# Patient Record
Sex: FEMALE | Race: WHITE | NOT HISPANIC OR LATINO | Employment: PART TIME | URBAN - METROPOLITAN AREA
[De-identification: names, ages, dates, MRNs, and addresses within clinical notes are randomized per-mention and may not be internally consistent; named-entity substitution may affect disease eponyms.]

---

## 2019-10-14 ENCOUNTER — OFFICE VISIT (OUTPATIENT)
Dept: FAMILY MEDICINE CLINIC | Facility: CLINIC | Age: 24
End: 2019-10-14
Payer: COMMERCIAL

## 2019-10-14 VITALS
HEIGHT: 60 IN | TEMPERATURE: 98.6 F | OXYGEN SATURATION: 99 % | DIASTOLIC BLOOD PRESSURE: 80 MMHG | SYSTOLIC BLOOD PRESSURE: 122 MMHG | WEIGHT: 282 LBS | HEART RATE: 99 BPM | BODY MASS INDEX: 55.36 KG/M2

## 2019-10-14 DIAGNOSIS — L30.9 CHRONIC ECZEMA OF FOOT: Primary | ICD-10-CM

## 2019-10-14 PROCEDURE — 3008F BODY MASS INDEX DOCD: CPT | Performed by: FAMILY MEDICINE

## 2019-10-14 PROCEDURE — 99213 OFFICE O/P EST LOW 20 MIN: CPT | Performed by: FAMILY MEDICINE

## 2019-10-14 NOTE — PROGRESS NOTES
Assessment/Plan:    Diagnoses and all orders for this visit:    Chronic eczema of foot  Has failed on multiple therapies including anti-fungals and topical steroids; Differentials still include Tinea Pedia vs  Foot Eczema vs  Psoriasis; Will refer to Dermatology    -     Ambulatory referral to Dermatology; Future    BMI 50 0-59 9, adult ([facility] [address] )  Counseled on Diet/Lifestyle Modification        Subjective:      Patient ID: Erika Oseguera is a 21 y o  female  HPI  21year old female presents to clinic with a chief complaint of rash  Located on right foot  Ongoing for 4-5 months  Has tried OTC medications - Aquafor and other creams - provided no relief  Saw a doctor about 2 months ago and was given a steroid cream (Triamcinolone), which has not alleviated symptoms  She thought she had athlete's foot at one point and tried OTC anti-fungal medications without relief  She thought it was psoriasis and even tried creams for psoriasis  Rash located on outer portion of her right foot, right big toe, right middle toe, and over the webspace of the right 4th toe and 5th toe  Rash itches and sometimes weeps clear liquid  No other areas of body affected  The following portions of the patient's history were reviewed and updated as appropriate: allergies, current medications, past family history, past medical history, past social history, past surgical history and problem list     Review of Systems   Constitutional: Negative for activity change, appetite change, chills, diaphoresis, fatigue, fever and unexpected weight change  Skin: Positive for rash  All other systems reviewed and are negative  Objective:    /80   Pulse 99   Temp 98 6 °F (37 °C)   Ht 5' (1 524 m)   Wt 128 kg (282 lb)   SpO2 99%   BMI 55 07 kg/m²      Physical Exam   Constitutional: She appears well-developed and well-nourished  No distress  Skin: Rash noted   No abrasion, no bruising, no ecchymosis, no laceration, no lesion and no petechiae noted  She is not diaphoretic  No erythema  Nursing note and vitals reviewed

## 2020-02-08 ENCOUNTER — HOSPITAL ENCOUNTER (EMERGENCY)
Facility: HOSPITAL | Age: 25
Discharge: HOME | End: 2020-02-08
Attending: EMERGENCY MEDICINE
Payer: COMMERCIAL

## 2020-02-08 VITALS
OXYGEN SATURATION: 98 % | DIASTOLIC BLOOD PRESSURE: 81 MMHG | WEIGHT: 282 LBS | HEIGHT: 60 IN | BODY MASS INDEX: 55.36 KG/M2 | TEMPERATURE: 100.5 F | HEART RATE: 114 BPM | RESPIRATION RATE: 20 BRPM | SYSTOLIC BLOOD PRESSURE: 137 MMHG

## 2020-02-08 DIAGNOSIS — J11.1 INFLUENZA: Primary | ICD-10-CM

## 2020-02-08 PROCEDURE — 99283 EMERGENCY DEPT VISIT LOW MDM: CPT | Performed by: PHYSICIAN ASSISTANT

## 2020-02-08 PROCEDURE — 99282 EMERGENCY DEPT VISIT SF MDM: CPT

## 2020-02-08 RX ORDER — DEXAMETHASONE 4 MG/1
10 TABLET ORAL ONCE
Status: COMPLETED | OUTPATIENT
Start: 2020-02-08 | End: 2020-02-08

## 2020-02-08 RX ORDER — BENZONATATE 100 MG/1
100 CAPSULE ORAL 3 TIMES DAILY PRN
Qty: 20 CAPSULE | Refills: 0 | Status: SHIPPED | OUTPATIENT
Start: 2020-02-08 | End: 2020-12-08

## 2020-02-08 RX ORDER — ONDANSETRON 4 MG/1
4 TABLET, ORALLY DISINTEGRATING ORAL EVERY 6 HOURS PRN
Qty: 20 TABLET | Refills: 0 | Status: SHIPPED | OUTPATIENT
Start: 2020-02-08 | End: 2020-12-08

## 2020-02-08 RX ORDER — ONDANSETRON 4 MG/1
4 TABLET, ORALLY DISINTEGRATING ORAL ONCE
Status: COMPLETED | OUTPATIENT
Start: 2020-02-08 | End: 2020-02-08

## 2020-02-08 RX ORDER — FLUTICASONE PROPIONATE 50 MCG
2 SPRAY, SUSPENSION (ML) NASAL 2 TIMES DAILY
Qty: 16 G | Refills: 0 | Status: SHIPPED | OUTPATIENT
Start: 2020-02-08 | End: 2020-02-11

## 2020-02-08 RX ORDER — NAPROXEN 500 MG/1
500 TABLET ORAL 2 TIMES DAILY WITH MEALS
Qty: 14 TABLET | Refills: 0 | Status: SHIPPED | OUTPATIENT
Start: 2020-02-08 | End: 2020-12-10

## 2020-02-08 RX ADMIN — ONDANSETRON 4 MG: 4 TABLET, ORALLY DISINTEGRATING ORAL at 17:09

## 2020-02-08 RX ADMIN — DEXAMETHASONE 10 MG: 4 TABLET ORAL at 17:10

## 2020-02-08 NOTE — ED PROVIDER NOTES
History  Chief Complaint   Patient presents with    Sore Throat     Patient has had cough, congestion, fevers and a sore throat for 4 days  22 y/o female presenting with flu like symptoms x 3-4 days accompanied with cough, congestion, fevers, generalized body aches and sore throat  Has also had some nausea and vomiting  Sick contacts in with similar symptoms  Low-grade fevers being 100 4  Has not taken any medication for this today  She otherwise has been taking over-the-counter cough medications with some relief  She is tolerating fluids  Denies shortness of breath, chest pain, abdominal pain, weakness, facial swelling, difficulty swallowing  None       History reviewed  No pertinent past medical history  History reviewed  No pertinent surgical history  History reviewed  No pertinent family history  I have reviewed and agree with the history as documented  Social History     Tobacco Use    Smoking status: Former Smoker    Smokeless tobacco: Never Used   Substance Use Topics    Alcohol use: Not Currently    Drug use: Never        Review of Systems   Constitutional: Positive for fever  Negative for activity change, appetite change, chills, diaphoresis and fatigue  HENT: Positive for congestion and sore throat  Negative for dental problem, ear pain, facial swelling, mouth sores, postnasal drip, sinus pressure and trouble swallowing  Eyes: Negative  Respiratory: Positive for cough  Negative for chest tightness, shortness of breath and wheezing  Cardiovascular: Negative  Negative for chest pain  Gastrointestinal: Positive for nausea and vomiting  Negative for abdominal distention, abdominal pain, anal bleeding, blood in stool, constipation, diarrhea and rectal pain  Genitourinary: Negative  Musculoskeletal: Negative  Negative for arthralgias, neck pain and neck stiffness  Skin: Negative  Negative for rash  Neurological: Negative    Negative for facial asymmetry and headaches  All other systems reviewed and are negative  Physical Exam  Physical Exam   Constitutional: She is oriented to person, place, and time  She appears well-developed and well-nourished  No distress  HENT:   Head: Normocephalic and atraumatic  Right Ear: External ear normal    Left Ear: External ear normal    Nose: Nose normal    Mouth/Throat: Oropharynx is clear and moist  No oropharyngeal exudate  Mild amount of erythema noted  No swelling, exudate, or uvula deviation noted of mouth  No discoloration, asymmetries or swelling of the face  No ulcerations, fluctuance, induration or drainage noted  No petechiae noted on palate  Able to swallow without difficulty  Full ROM of jaw  Eyes: Pupils are equal, round, and reactive to light  Conjunctivae are normal  Right eye exhibits no discharge  Left eye exhibits no discharge  No scleral icterus  Neck: Normal range of motion  Neck supple  No tracheal deviation present  Cardiovascular: Normal rate, regular rhythm, normal heart sounds and intact distal pulses  Exam reveals no friction rub  No murmur heard  Pulmonary/Chest: Effort normal and breath sounds normal  No stridor  No respiratory distress  She has no wheezes  She has no rales  She exhibits no tenderness  SpO2 is 98%, within normal limits, resting comfortably  No cyanosis or pallor  Abdominal: Soft  Bowel sounds are normal  She exhibits no distension  There is no tenderness  There is no rebound and no guarding  Lymphadenopathy:     She has no cervical adenopathy  Neurological: She is alert and oriented to person, place, and time  Skin: Skin is warm and dry  Capillary refill takes less than 2 seconds  No rash noted  She is not diaphoretic  No erythema  No pallor  No sandpaper rash noted  No other rashes noted  Good coloration of skin  Nursing note and vitals reviewed        Vital Signs  ED Triage Vitals [02/08/20 1649]   Temperature Pulse Respirations Blood Pressure SpO2   100 5 °F (38 1 °C) (!) 114 20 137/81 98 %      Temp src Heart Rate Source Patient Position - Orthostatic VS BP Location FiO2 (%)   -- -- -- -- --      Pain Score       3           Vitals:    02/08/20 1649   BP: 137/81   Pulse: (!) 114         Visual Acuity      ED Medications  Medications   dexamethasone (DECADRON) tablet 10 mg (has no administration in time range)   ondansetron (ZOFRAN-ODT) dispersible tablet 4 mg (4 mg Oral Given 2/8/20 1709)       Diagnostic Studies  Results Reviewed     None                 No orders to display              Procedures  Procedures         ED Course                               MDM  Number of Diagnoses or Management Options  Diagnosis management comments: Discussed with patient that she likely has the flu  I offered IV hydration and lab work however patient states that she feels as though she can maintain her hydration at home with Zofran  Will treat symptomatically  Patient is informed to return to the emergency department for worsening of symptoms and was given proper education regarding their diagnosis and symptoms  Otherwise the patient is informed to follow up with their primary care doctor for re-evaluation  The patient verbalizes understanding and agrees with above assessment and plan  All questions were answered  Please Note: Fluency Direct voice recognition software may have been used in the creation of this document  Wrong words or sound a like substitutions may have occurred due to the inherent limitations of the voice software             Amount and/or Complexity of Data Reviewed  Review and summarize past medical records: yes  Independent visualization of images, tracings, or specimens: yes          Disposition  Final diagnoses:   Influenza     Time reflects when diagnosis was documented in both MDM as applicable and the Disposition within this note     Time User Action Codes Description Comment    2/8/2020  5:09 PM Racheal Barrett Add [J11 1] Influenza       ED Disposition     ED Disposition Condition Date/Time Comment    Discharge Stable Sat Feb 8, 2020  5:09 PM Marialuisa Sorto discharge to home/self care  Follow-up Information     Follow up With Specialties Details Why Contact Info Additional [address] Emergency Department Emergency Medicine Go to  If symptoms worsen such as high fevers, difficulty breathing, dehydration etc [facility]  [phone] [facility] ED, [facility] [place], [address]          Patient's Medications   Discharge Prescriptions    BENZONATATE (TESSALON PERLES) 100 MG CAPSULE    Take 1 capsule (100 mg total) by mouth 3 (three) times a day as needed for cough       Start Date: 2/8/2020  End Date: --       Order Dose: 100 mg       Quantity: 20 capsule    Refills: 0    FLUTICASONE (FLONASE) 50 MCG/ACT NASAL SPRAY    2 sprays into each nostril 2 (two) times a day for 3 days       Start Date: 2/8/2020  End Date: 2/11/2020       Order Dose: 2 sprays       Quantity: 16 g    Refills: 0    NAPROXEN (NAPROSYN) 500 MG TABLET    Take 1 tablet (500 mg total) by mouth 2 (two) times a day with meals for 7 days       Start Date: 2/8/2020  End Date: 2/15/2020       Order Dose: 500 mg       Quantity: 14 tablet    Refills: 0    ONDANSETRON (ZOFRAN-ODT) 4 MG DISINTEGRATING TABLET    Take 1 tablet (4 mg total) by mouth every 6 (six) hours as needed for nausea or vomiting       Start Date: 2/8/2020  End Date: --       Order Dose: 4 mg       Quantity: 20 tablet    Refills: 0     No discharge procedures on file      ED Provider  Electronically Signed by           Luisa Prado PA-C  02/08/20 2768

## 2020-02-08 NOTE — ED NOTES
Pt given prescriptions and discharge instructions  Pt ambulated out of ER, gait steady       Maritza Dumont RN  02/08/20 4434

## 2020-02-12 ENCOUNTER — OFFICE VISIT (OUTPATIENT)
Dept: URGENT CARE | Facility: CLINIC | Age: 25
End: 2020-02-12
Payer: COMMERCIAL

## 2020-02-12 VITALS
DIASTOLIC BLOOD PRESSURE: 55 MMHG | HEART RATE: 79 BPM | TEMPERATURE: 97.3 F | SYSTOLIC BLOOD PRESSURE: 125 MMHG | WEIGHT: 276 LBS | BODY MASS INDEX: 52.11 KG/M2 | HEIGHT: 61 IN | RESPIRATION RATE: 18 BRPM | OXYGEN SATURATION: 97 %

## 2020-02-12 DIAGNOSIS — J06.9 ACUTE URI: Primary | ICD-10-CM

## 2020-02-12 PROCEDURE — 1036F TOBACCO NON-USER: CPT | Performed by: PREVENTIVE MEDICINE

## 2020-02-12 PROCEDURE — 99213 OFFICE O/P EST LOW 20 MIN: CPT | Performed by: PREVENTIVE MEDICINE

## 2020-02-12 PROCEDURE — 3008F BODY MASS INDEX DOCD: CPT | Performed by: PREVENTIVE MEDICINE

## 2020-02-12 RX ORDER — AMOXICILLIN 500 MG/1
500 CAPSULE ORAL EVERY 8 HOURS SCHEDULED
Qty: 21 CAPSULE | Refills: 0 | Status: SHIPPED | OUTPATIENT
Start: 2020-02-12 | End: 2020-02-19

## 2020-02-12 NOTE — PATIENT INSTRUCTIONS

## 2020-02-13 NOTE — PROGRESS NOTES
[facility] [address] [facility]        NAME: Jacy Pop is a 21 y o  female  : 1995    MRN: [id number]  DATE: 2020  TIME: 10:48 AM    Assessment and Plan   Acute URI [J06 9]  1  Acute URI  amoxicillin (AMOXIL) 500 mg capsule         Patient Instructions       Follow up with PCP in 3-5 days  Proceed to  ER if symptoms worsen  Chief Complaint     Chief Complaint   Patient presents with    Shortness of Breath     SOB, temp 101, since last Wednesday, was seen in ER  given, Tessalon pearls, Flonase, Naproxen Feels she has Pneumonia,         History of Present Illness       Shortness of Breath   This is a new problem  The current episode started 1 to 4 weeks ago  The problem occurs constantly  The problem has been unchanged  Associated symptoms include chest pain, a fever and a sore throat  Nothing aggravates the symptoms  She has tried prescription cough suppressants for the symptoms  The treatment provided no relief  Review of Systems   Review of Systems   Constitutional: Positive for fever  HENT: Positive for congestion, postnasal drip, sinus pressure and sore throat  Eyes: Negative  Respiratory: Positive for chest tightness and shortness of breath  Cardiovascular: Positive for chest pain  All other systems reviewed and are negative          Current Medications       Current Outpatient Medications:     benzonatate (TESSALON PERLES) 100 mg capsule, Take 1 capsule (100 mg total) by mouth 3 (three) times a day as needed for cough, Disp: 20 capsule, Rfl: 0    naproxen (NAPROSYN) 500 mg tablet, Take 1 tablet (500 mg total) by mouth 2 (two) times a day with meals for 7 days, Disp: 14 tablet, Rfl: 0    amoxicillin (AMOXIL) 500 mg capsule, Take 1 capsule (500 mg total) by mouth every 8 (eight) hours for 7 days, Disp: 21 capsule, Rfl: 0    fluticasone (FLONASE) 50 mcg/act nasal spray, 2 sprays into each nostril 2 (two) times a day for 3 days, Disp: 16 g, Rfl: 0    ondansetron (ZOFRAN-ODT) 4 mg disintegrating tablet, Take 1 tablet (4 mg total) by mouth every 6 (six) hours as needed for nausea or vomiting (Patient not taking: Reported on 2/12/2020), Disp: 20 tablet, Rfl: 0    Current Allergies     Allergies as of 02/12/2020    (No Known Allergies)            The following portions of the patient's history were reviewed and updated as appropriate: allergies, current medications, past family history, past medical history, past social history, past surgical history and problem list      History reviewed  No pertinent past medical history  History reviewed  No pertinent surgical history  History reviewed  No pertinent family history  Medications have been verified  Objective   /55   Pulse 79   Temp (!) 97 3 °F (36 3 °C)   Resp 18   Ht 5' 0 5" (1 537 m)   Wt 125 kg (276 lb)   LMP 01/27/2020   SpO2 97%   BMI 53 02 kg/m²        Physical Exam     Physical Exam   Constitutional: She appears well-developed and well-nourished  HENT:   Head: Normocephalic  Right Ear: External ear normal    Left Ear: External ear normal    Nose: Mucosal edema and rhinorrhea present  Mouth/Throat: Posterior oropharyngeal erythema present  No oropharyngeal exudate or posterior oropharyngeal edema  Neck: Normal range of motion  Cardiovascular: Normal rate, regular rhythm and normal heart sounds  Pulmonary/Chest: Effort normal  She has no wheezes  Lymphadenopathy:     She has no cervical adenopathy  Skin: Skin is warm  No rash noted  No pallor  Nursing note and vitals reviewed

## 2020-08-31 ENCOUNTER — OFFICE VISIT (OUTPATIENT)
Dept: URGENT CARE | Facility: CLINIC | Age: 25
End: 2020-08-31
Payer: COMMERCIAL

## 2020-08-31 VITALS
SYSTOLIC BLOOD PRESSURE: 138 MMHG | OXYGEN SATURATION: 97 % | RESPIRATION RATE: 18 BRPM | BODY MASS INDEX: 53.81 KG/M2 | DIASTOLIC BLOOD PRESSURE: 56 MMHG | HEART RATE: 90 BPM | TEMPERATURE: 98.3 F | WEIGHT: 285 LBS | HEIGHT: 61 IN

## 2020-08-31 DIAGNOSIS — M54.6 ACUTE LEFT-SIDED THORACIC BACK PAIN: Primary | ICD-10-CM

## 2020-08-31 PROCEDURE — 99242 OFF/OP CONSLTJ NEW/EST SF 20: CPT | Performed by: PHYSICIAN ASSISTANT

## 2020-08-31 RX ORDER — METHOCARBAMOL 500 MG/1
500 TABLET, FILM COATED ORAL 4 TIMES DAILY
Qty: 40 TABLET | Refills: 0 | Status: SHIPPED | OUTPATIENT
Start: 2020-08-31 | End: 2020-12-08

## 2020-08-31 RX ORDER — IBUPROFEN 800 MG/1
800 TABLET ORAL EVERY 8 HOURS PRN
Qty: 30 TABLET | Refills: 0 | Status: SHIPPED | OUTPATIENT
Start: 2020-08-31 | End: 2020-12-08

## 2020-08-31 NOTE — PROGRESS NOTES
[address] [facility]        NAME: Verna Rodriguez is a 31 y o  female  : 1995    MRN: [id number]  DATE: 2020  TIME: 2:15 PM    Assessment and Plan   Acute left-sided thoracic back pain [M54 6]  1  Acute left-sided thoracic back pain  methocarbamol (ROBAXIN) 500 mg tablet    ibuprofen (MOTRIN) 800 mg tablet    Ambulatory referral to Physical Therapy         Patient Instructions     Patient Instructions     No fall or trauma  No radicular symptoms  Will benefit from physical therapy  Moist heat  Muscle relaxers at night  Do not take the Robaxin and drive until you know how make she feel  Follow up with PCP in 3-5 days  Proceed to  ER if symptoms worsen  Chief Complaint     Chief Complaint   Patient presents with    Back Pain     across lumbar area began after sleeping has no history of injury feels she slept funny         History of Present Illness         69-year-old female complains of 2-3 days of left-sided mid and upper back pain  She has no fall or trauma  She woke up with the pain  No recent change in activity or exercise  No pain down the arm numbness tingling  No pain down the lower back or legs  No muscle weakness  No change in bowel or bladder habits  Review of Systems   Review of Systems   Constitutional: Negative for chills, fatigue and fever  HENT: Negative for congestion, ear pain, postnasal drip, rhinorrhea, sinus pressure, sinus pain, sore throat and trouble swallowing  Eyes: Negative for visual disturbance  Respiratory: Negative for chest tightness and shortness of breath  Cardiovascular: Negative for chest pain and palpitations  Gastrointestinal: Negative for diarrhea, nausea and vomiting  Musculoskeletal: Positive for back pain  Neurological: Negative for dizziness           Current Medications       Current Outpatient Medications:     benzonatate (TESSALON PERLES) 100 mg capsule, Take 1 capsule (100 mg total) by mouth 3 (three) times a day as needed for cough (Patient not taking: Reported on 8/31/2020), Disp: 20 capsule, Rfl: 0    fluticasone (FLONASE) 50 mcg/act nasal spray, 2 sprays into each nostril 2 (two) times a day for 3 days, Disp: 16 g, Rfl: 0    ibuprofen (MOTRIN) 800 mg tablet, Take 1 tablet (800 mg total) by mouth every 8 (eight) hours as needed for mild pain, Disp: 30 tablet, Rfl: 0    methocarbamol (ROBAXIN) 500 mg tablet, Take 1 tablet (500 mg total) by mouth 4 (four) times a day, Disp: 40 tablet, Rfl: 0    naproxen (NAPROSYN) 500 mg tablet, Take 1 tablet (500 mg total) by mouth 2 (two) times a day with meals for 7 days, Disp: 14 tablet, Rfl: 0    ondansetron (ZOFRAN-ODT) 4 mg disintegrating tablet, Take 1 tablet (4 mg total) by mouth every 6 (six) hours as needed for nausea or vomiting (Patient not taking: Reported on 2/12/2020), Disp: 20 tablet, Rfl: 0    Current Allergies     Allergies as of 08/31/2020    (No Known Allergies)            The following portions of the patient's history were reviewed and updated as appropriate: allergies, current medications, past family history, past medical history, past social history, past surgical history and problem list      No past medical history on file  No past surgical history on file  No family history on file  Medications have been verified  Objective   /56   Pulse 90   Temp 98 3 °F (36 8 °C)   Resp 18   Ht 5' 1" (1 549 m)   Wt 129 kg (285 lb)   LMP 07/19/2020   SpO2 97%   BMI 53 85 kg/m²        Physical Exam     Physical Exam  Constitutional:       General: She is not in acute distress  Appearance: She is well-developed  Musculoskeletal:      Comments:   T-spine nontender  Lumbar spine nontender  She is tender along the left paravertebral muscles and medial scapular border and inferior scapula of the left side  Full range of motion bilateral upper extremities but pain with left shoulder flexion past 90    She has pain with left shoulder internal rotation at 90° of abduction  Bilateral upper extremity strength 5/5  Lumbar spine full range of motion  She has some pain with right lateral bending and left lateral bending  No pain with rotation  Negative straight leg raise bilaterally  Bilateral lower extremity strength 5/5  Neurological:      Mental Status: She is alert and oriented to person, place, and time

## 2020-08-31 NOTE — PATIENT INSTRUCTIONS
No fall or trauma  No radicular symptoms  Will benefit from physical therapy  Moist heat  Muscle relaxers at night  Do not take the Robaxin and drive until you know how make she feel

## 2020-12-08 ENCOUNTER — APPOINTMENT (EMERGENCY)
Dept: RADIOLOGY | Facility: HOSPITAL | Age: 25
End: 2020-12-08
Payer: COMMERCIAL

## 2020-12-08 ENCOUNTER — HOSPITAL ENCOUNTER (EMERGENCY)
Facility: HOSPITAL | Age: 25
Discharge: HOME | End: 2020-12-08
Attending: EMERGENCY MEDICINE | Admitting: EMERGENCY MEDICINE
Payer: COMMERCIAL

## 2020-12-08 VITALS
DIASTOLIC BLOOD PRESSURE: 68 MMHG | TEMPERATURE: 98.3 F | SYSTOLIC BLOOD PRESSURE: 115 MMHG | BODY MASS INDEX: 53.28 KG/M2 | OXYGEN SATURATION: 98 % | WEIGHT: 282 LBS | RESPIRATION RATE: 20 BRPM | HEART RATE: 104 BPM

## 2020-12-08 DIAGNOSIS — S20.211A CONTUSION OF RIGHT CHEST WALL, INITIAL ENCOUNTER: ICD-10-CM
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
## 2020-12-08 PROCEDURE — 71045 X-RAY EXAM CHEST 1 VIEW: CPT

## 2020-12-08 PROCEDURE — 93005 ELECTROCARDIOGRAM TRACING: CPT

## 2020-12-08 PROCEDURE — 99282 EMERGENCY DEPT VISIT SF MDM: CPT | Performed by: EMERGENCY MEDICINE

## 2020-12-08 PROCEDURE — 99284 EMERGENCY DEPT VISIT MOD MDM: CPT

## 2020-12-10 ENCOUNTER — HOSPITAL ENCOUNTER (EMERGENCY)
Facility: HOSPITAL | Age: 25
Discharge: HOME | End: 2020-12-10
Attending: EMERGENCY MEDICINE | Admitting: EMERGENCY MEDICINE
Payer: COMMERCIAL

## 2020-12-10 ENCOUNTER — APPOINTMENT (EMERGENCY)
Dept: RADIOLOGY | Facility: HOSPITAL | Age: 25
End: 2020-12-10
Payer: COMMERCIAL

## 2020-12-10 VITALS
TEMPERATURE: 98 F | DIASTOLIC BLOOD PRESSURE: 77 MMHG | BODY MASS INDEX: 53.34 KG/M2 | OXYGEN SATURATION: 100 % | SYSTOLIC BLOOD PRESSURE: 164 MMHG | HEART RATE: 95 BPM | WEIGHT: 282.3 LBS | RESPIRATION RATE: 20 BRPM

## 2020-12-10 DIAGNOSIS — S16.1XXA STRAIN OF NECK MUSCLE, INITIAL ENCOUNTER: ICD-10-CM

## 2020-12-10 DIAGNOSIS — S06.0X9A CONCUSSION: Primary | ICD-10-CM

## 2020-12-10 PROCEDURE — 99284 EMERGENCY DEPT VISIT MOD MDM: CPT | Performed by: EMERGENCY MEDICINE

## 2020-12-10 PROCEDURE — 99284 EMERGENCY DEPT VISIT MOD MDM: CPT

## 2020-12-10 PROCEDURE — 70450 CT HEAD/BRAIN W/O DYE: CPT

## 2020-12-10 PROCEDURE — 72125 CT NECK SPINE W/O DYE: CPT

## 2020-12-10 PROCEDURE — G1004 CDSM NDSC: HCPCS

## 2020-12-10 RX ORDER — ONDANSETRON 4 MG/1
4 TABLET, ORALLY DISINTEGRATING ORAL EVERY 8 HOURS PRN
Qty: 20 TABLET | Refills: 0 | Status: SHIPPED | OUTPATIENT
Start: 2020-12-10 | End: 2021-09-17 | Stop reason: ALTCHOICE

## 2020-12-11 LAB
ATRIAL RATE: 97 BPM
P AXIS: 55 DEGREES
PR INTERVAL: 164 MS
QRS AXIS: 4 DEGREES
QRSD INTERVAL: 82 MS
QT INTERVAL: 338 MS
QTC INTERVAL: 429 MS
T WAVE AXIS: 45 DEGREES
VENTRICULAR RATE: 97 BPM

## 2020-12-11 PROCEDURE — 93010 ELECTROCARDIOGRAM REPORT: CPT | Performed by: INTERNAL MEDICINE

## 2021-01-13 ENCOUNTER — TELEMEDICINE (OUTPATIENT)
Dept: FAMILY MEDICINE CLINIC | Facility: CLINIC | Age: 26
End: 2021-01-13
Payer: COMMERCIAL

## 2021-01-13 DIAGNOSIS — Z20.822 EXPOSURE TO COVID-19 VIRUS: ICD-10-CM

## 2021-01-13 DIAGNOSIS — B34.9 VIRAL INFECTION, UNSPECIFIED: ICD-10-CM

## 2021-01-13 PROCEDURE — 99213 OFFICE O/P EST LOW 20 MIN: CPT | Performed by: FAMILY MEDICINE

## 2021-01-13 NOTE — PROGRESS NOTES
COVID-19 Virtual Visit     Assessment/Plan:    Problem List Items Addressed This Visit     None      Visit Diagnoses     Exposure to COVID-19 virus        Viral infection, unspecified             Disposition:     I referred patient to one of our centralized sites for a COVID-19 swab  I have spent 15 minutes directly with the patient  Greater than 50% of this time was spent in counseling/coordination of care regarding: risks and benefits of treatment options, importance of treatment compliance and impressions  Encounter provider Travis Schmid DO    Provider located at [address] [place] [id number]    Recent Visits  No visits were found meeting these conditions  Showing recent visits within past 7 days and meeting all other requirements     Today's Visits  Date Type Provider Dept   21 Telemedicine Travis Schmid DO  [facility]   Showing today's visits and meeting all other requirements     Future Appointments  No visits were found meeting these conditions  Showing future appointments within next 150 days and meeting all other requirements          Subjective:   Kaur Damon is a 31 y o  female who is concerned about COVID-19  No results found for: Jen Zhu  No past medical history on file  Past Surgical History:   Procedure Laterality Date     SECTION       Current Outpatient Medications   Medication Sig Dispense Refill    fluticasone (FLONASE) 50 mcg/act nasal spray 2 sprays into each nostril 2 (two) times a day for 3 days 16 g 0    ondansetron (ZOFRAN-ODT) 4 mg disintegrating tablet Take 1 tablet (4 mg total) by mouth every 8 (eight) hours as needed for nausea or vomiting 20 tablet 0     No current facility-administered medications for this visit  No Known Allergies    Review of Systems  Objective:     There were no vitals filed for this visit     Physical Exam  VIRTUAL VISIT DISCLAIMER    Rafael Rain acknowledges that she has consented to an online visit or consultation  She understands that the online visit is based solely on information provided by her, and that, in the absence of a face-to-face physical evaluation by the physician, the diagnosis she receives is both limited and provisional in terms of accuracy and completeness  This is not intended to replace a full medical face-to-face evaluation by the physician  Rafael Rain understands and accepts these terms

## 2021-01-15 ENCOUNTER — DOCUMENTATION (OUTPATIENT)
Dept: FAMILY MEDICINE CLINIC | Facility: CLINIC | Age: 26
End: 2021-01-15

## 2021-01-15 ENCOUNTER — TELEPHONE (OUTPATIENT)
Dept: FAMILY MEDICINE CLINIC | Facility: CLINIC | Age: 26
End: 2021-01-15

## 2021-01-15 DIAGNOSIS — Z20.822 EXPOSURE TO COVID-19 VIRUS: ICD-10-CM

## 2021-01-15 DIAGNOSIS — Z20.822 EXPOSURE TO COVID-19 VIRUS: Primary | ICD-10-CM

## 2021-01-15 DIAGNOSIS — J06.9 VIRAL URI: ICD-10-CM

## 2021-01-15 PROCEDURE — U0003 INFECTIOUS AGENT DETECTION BY NUCLEIC ACID (DNA OR RNA); SEVERE ACUTE RESPIRATORY SYNDROME CORONAVIRUS 2 (SARS-COV-2) (CORONAVIRUS DISEASE [COVID-19]), AMPLIFIED PROBE TECHNIQUE, MAKING USE OF HIGH THROUGHPUT TECHNOLOGIES AS DESCRIBED BY CMS-2020-01-R: HCPCS | Performed by: STUDENT IN AN ORGANIZED HEALTH CARE EDUCATION/TRAINING PROGRAM

## 2021-01-15 PROCEDURE — U0005 INFEC AGEN DETEC AMPLI PROBE: HCPCS | Performed by: STUDENT IN AN ORGANIZED HEALTH CARE EDUCATION/TRAINING PROGRAM

## 2021-01-15 NOTE — TELEPHONE ENCOUNTER
Patient was had Virtual Visit on 1/13/2021, States a Order was going to Placed into her chart for COVID Test, states When arriving at the [facility], she was informed no order in her chart

## 2021-01-17 LAB — SARS-COV-2 RNA SPEC QL NAA+PROBE: DETECTED

## 2021-05-28 ENCOUNTER — OFFICE VISIT (OUTPATIENT)
Dept: URGENT CARE | Facility: CLINIC | Age: 26
End: 2021-05-28
Payer: COMMERCIAL

## 2021-05-28 VITALS
DIASTOLIC BLOOD PRESSURE: 75 MMHG | HEIGHT: 60 IN | SYSTOLIC BLOOD PRESSURE: 124 MMHG | RESPIRATION RATE: 16 BRPM | BODY MASS INDEX: 54.38 KG/M2 | HEART RATE: 81 BPM | WEIGHT: 277 LBS | OXYGEN SATURATION: 97 % | TEMPERATURE: 96.8 F

## 2021-05-28 DIAGNOSIS — J01.90 ACUTE NON-RECURRENT SINUSITIS, UNSPECIFIED LOCATION: Primary | ICD-10-CM

## 2021-05-28 DIAGNOSIS — J30.1 SEASONAL ALLERGIC RHINITIS DUE TO POLLEN: ICD-10-CM

## 2021-05-28 PROCEDURE — 1036F TOBACCO NON-USER: CPT | Performed by: PHYSICIAN ASSISTANT

## 2021-05-28 PROCEDURE — 3008F BODY MASS INDEX DOCD: CPT | Performed by: PHYSICIAN ASSISTANT

## 2021-05-28 PROCEDURE — 99213 OFFICE O/P EST LOW 20 MIN: CPT | Performed by: PHYSICIAN ASSISTANT

## 2021-05-28 RX ORDER — FLUTICASONE PROPIONATE 50 MCG
2 SPRAY, SUSPENSION (ML) NASAL DAILY
Qty: 16 G | Refills: 0 | Status: SHIPPED | OUTPATIENT
Start: 2021-05-28 | End: 2021-09-17 | Stop reason: ALTCHOICE

## 2021-05-28 RX ORDER — BENZONATATE 200 MG/1
200 CAPSULE ORAL 3 TIMES DAILY PRN
Qty: 20 CAPSULE | Refills: 0 | Status: SHIPPED | OUTPATIENT
Start: 2021-05-28 | End: 2021-09-17 | Stop reason: ALTCHOICE

## 2021-05-28 RX ORDER — AZITHROMYCIN 250 MG/1
TABLET, FILM COATED ORAL
Qty: 6 TABLET | Refills: 0 | Status: SHIPPED | OUTPATIENT
Start: 2021-05-28 | End: 2021-06-01

## 2021-05-28 NOTE — PROGRESS NOTES
[facility]        NAME: Samuel Hernandez is a 31 y o  female  : 1995    MRN: [id number]  DATE:  May 28, 2021  TIME: 12:06 PM    Assessment and Plan   Acute non-recurrent sinusitis, unspecified location [J01 90]  1  Acute non-recurrent sinusitis, unspecified location  azithromycin (ZITHROMAX) 250 mg tablet    benzonatate (TESSALON) 200 MG capsule   2  Seasonal allergic rhinitis due to pollen  fluticasone (FLONASE) 50 mcg/act nasal spray         Patient Instructions     Discussed condition with pt  She has acute sinusitis which likely stems from uncontrolled allergic rhinitis  I will treat with an oral abx, Tessalon Perles, and Flonase and rec hydration, rest, discussed OTC cough/cold/allergy meds, and observation  Should be re-evaluated if condition persists or worsens  Follow up with PCP in 3-5 days  Proceed to  ER if symptoms worsen  Chief Complaint     Chief Complaint   Patient presents with    URI     Pt reports of worsening URI s/s with post nasal drip and congestion  S/s started approx 5 days ago,  History of Present Illness         Patient presents with 5 day history of nasal congestion, postnasal drip, cough which is worsening  Denies sore throat, fever, chills, N/ V/D, or known direct exposure to COVID-19  Has been managing symptoms with OTC meds  She believes she may have allergies  Denies asthma  Does not smoke  Review of Systems   Review of Systems   Constitutional: Negative  HENT: Positive for congestion and postnasal drip  Negative for sore throat  Respiratory: Positive for cough  Negative for shortness of breath  Cardiovascular: Negative  Gastrointestinal: Negative  Genitourinary: Negative            Current Medications       Current Outpatient Medications:     benzonatate (TESSALON) 200 MG capsule, Take 1 capsule (200 mg total) by mouth 3 (three) times a day as needed for cough, Disp: 20 capsule, Rfl: 0    fluticasone (FLONASE) 50 mcg/act nasal spray, 2 sprays into each nostril daily, Disp: 16 g, Rfl: 0    ondansetron (ZOFRAN-ODT) 4 mg disintegrating tablet, Take 1 tablet (4 mg total) by mouth every 8 (eight) hours as needed for nausea or vomiting, Disp: 20 tablet, Rfl: 0    Current Allergies     Allergies as of 2021    (No Known Allergies)            The following portions of the patient's history were reviewed and updated as appropriate: allergies, current medications, past family history, past medical history, past social history, past surgical history and problem list      History reviewed  No pertinent past medical history  Past Surgical History:   Procedure Laterality Date     SECTION         History reviewed  No pertinent family history  Medications have been verified  Objective   /75   Pulse 81   Temp (!) 96 8 °F (36 °C)   Resp 16   Ht 5' (1 524 m)   Wt 126 kg (277 lb)   SpO2 97%   BMI 54 10 kg/m²   No LMP recorded  Physical Exam     Physical Exam  Vitals signs reviewed  Constitutional:       General: She is not in acute distress  Appearance: She is well-developed  HENT:      Right Ear: Hearing, tympanic membrane, ear canal and external ear normal       Left Ear: Hearing, tympanic membrane, ear canal and external ear normal       Nose: Mucosal edema (  Bilateral boggy turbinates) and congestion present  Mouth/Throat:      Mouth: Mucous membranes are moist       Pharynx: Posterior oropharyngeal erythema ( PND) present  No oropharyngeal exudate  Tonsils: No tonsillar exudate  Neck:      Musculoskeletal: Neck supple  Cardiovascular:      Rate and Rhythm: Normal rate and regular rhythm  Pulses: Normal pulses  Heart sounds: Normal heart sounds  No murmur  Pulmonary:      Effort: Pulmonary effort is normal  No respiratory distress  Breath sounds: Normal breath sounds  Lymphadenopathy:      Cervical: No cervical adenopathy     Neurological:      Mental Status: She is alert and oriented to person, place, and time

## 2021-05-28 NOTE — PATIENT INSTRUCTIONS
Sinusitis, Ambulatory Care   GENERAL INFORMATION:   Sinusitis  is inflammation or infection of your sinuses  It is most often caused by a virus  Acute sinusitis may last up to 12 weeks  Chronic sinusitis lasts longer than 12 weeks  Recurrent sinusitis is when you have 3 or more episodes of sinusitis in 1 year  Common symptoms include the following:   · Fever    · Pain, pressure, redness, or swelling around the forehead, cheeks, or eyes    · Thick yellow or green discharge from your nose    · Tenderness when you touch your face over your sinuses    · Dry cough that happens mostly at night or when you lie down    · Headache and face pain that is worse when you lean forward    · Teeth pain or pain when you chew  Seek immediate care for the following symptoms:   · Vision changes such as double vision    · Confusion or trouble thinking clearly    · Headache and stiff neck    · Trouble breathing  Treatment for sinusitis  may include medicines to relieve nasal and sinus congestion or to decrease pain and fever  Ask your healthcare provider which medicines you should take and how much is safe  Manage sinusitis:   · Drink liquids as directed  Ask your healthcare provider how much liquid to drink each day and which liquids are best for you  Liquids will help loosen and drain the mucus in your sinuses  · Breathe in steam   Heat a bowl of water until you see steam  Lean over the bowl and make a tent over your head with a large towel  Breathe deeply for about 20 minutes  Be careful not to get too close to the steam or burn yourself  Do this 3 times a day  You can also breathe deeply when you take a hot shower  · Rinse your sinuses  Use a sinus rinse device to rinse your nasal passages with a saline (salt water) solution  This will help thin the mucus in your nose and rinse away pollen and dirt  It will also help reduce swelling so you can breathe normally  Ask how often to do this       · Use heat on your sinuses  to decrease pain  Apply heat for 15 to 20 minutes every hour for as many days as directed  · Sleep with your head elevated  Place an extra pillow under your head before you go to sleep to help your sinuses drain  · Do not smoke and avoid secondhand smoke  If you smoke, it is never too late to quit  Ask for information about how to stop smoking if you need help  Prevent the spread of germs that cause sinusitis:  Wash your hands often with soap and water  Wash your hands after you use the bathroom, change a child's diaper, or sneeze  Wash your hands before you prepare or eat food  Follow up with your healthcare provider as directed:  Write down your questions so you remember to ask them during your visits  CARE AGREEMENT:   You have the right to help plan your care  Learn about your health condition and how it may be treated  Discuss treatment options with your caregivers to decide what care you want to receive  You always have the right to refuse treatment  The above information is an  only  It is not intended as medical advice for individual conditions or treatments  Talk to your doctor, nurse or pharmacist before following any medical regimen to see if it is safe and effective for you  © 2014 [address] is for End User's use only and may not be sold, redistributed or otherwise used for commercial purposes  All illustrations and images included in CareNotes® are the copyrighted property of [employer]  or Bernabe Patten  Allergic Rhinitis   WHAT YOU NEED TO KNOW:   Allergic rhinitis, or hay fever, is swelling of the inside of your nose  The swelling is a reaction to allergens in the air  An allergen can be anything that causes an allergic reaction  Allergies to weeds, grass, trees, or mold often cause seasonal allergic rhinitis  Indoor dust mites, cockroaches, pet dander, or mold can also cause allergic rhinitis     DISCHARGE INSTRUCTIONS:   Call 911 for the following:   · You have chest pain or shortness of breath  Return to the emergency department if:   · You have severe pain  · You cough up blood  Contact your healthcare provider if:   · You have a fever  · You have ear or sinus pain, or a headache  · Your symptoms get worse, even after treatment  · You have yellow, green, brown, or bloody mucus coming from your nose  · Your nose is bleeding or you have pain inside your nose  · You have trouble sleeping because of your symptoms  · You have questions or concerns about your condition or care  Medicines:   · Medicines  help decrease your symptoms and clear your stuffy nose  · Take your medicine as directed  Contact your healthcare provider if you think your medicine is not helping or if you have side effects  Tell him of her if you are allergic to any medicine  Keep a list of the medicines, vitamins, and herbs you take  Include the amounts, and when and why you take them  Bring the list or the pill bottles to follow-up visits  Carry your medicine list with you in case of an emergency  How to manage allergic rhinitis:  The best way to manage allergic rhinitis is to avoid allergens that can trigger your symptoms  Any of the following may help decrease your symptoms:  · Rinse your nose and sinuses  with a salt water solution or use a salt water nasal spray  This will help thin the mucus in your nose and rinse away pollen and dirt  It will also help reduce swelling so you can breathe normally  Ask your healthcare provider how often to rinse your nose  · Reduce exposure to dust mites  Wash sheets and towels in hot water every week  Cover your pillows and mattresses with allergen-free covers  Limit the number of stuffed animals and soft toys your child has  Wash your child's toys in hot water regularly  Vacuum weekly and use a vacuum  with an air filter  If possible, get rid of carpets and curtains   These collect dust and dust mites  · Reduce exposure to pollen  Keep windows and doors closed in your house and car  Stay inside when air pollution or the pollen count is high  Run your air conditioner on recycle, and change air filters often  Shower and wash your hair before bed every night to rinse away pollen  · Reduce exposure to pet dander  If possible, do not keep cats, dogs, birds, or other pets  If you do keep pets in your home, keep them out of bedrooms and carpeted rooms  Bathe them often  · Reduce exposure to mold  Do not spend time in basements  Choose artificial plants instead of live plants  Keep your home's humidity at less than 45%  Do not have ponds or standing water in your home or yard  · Do not smoke  Avoid others who smoke  Ask your healthcare provider for information if you currently smoke and need help to quit  Follow up with your healthcare provider as directed: You may need to see an allergist often to control your symptoms  Write down your questions so you remember to ask them during your visits  © Copyright [address] Information is for End User's use only and may not be sold, redistributed or otherwise used for commercial purposes  All illustrations and images included in CareNotes® are the copyrighted property of [employer]  or [address] [place], [place] [id number]   The above information is an  only  It is not intended as medical advice for individual conditions or treatments  Talk to your doctor, nurse or pharmacist before following any medical regimen to see if it is safe and effective for you

## 2021-06-03 ENCOUNTER — HOSPITAL ENCOUNTER (EMERGENCY)
Facility: HOSPITAL | Age: 26
Discharge: HOME | End: 2021-06-03
Attending: EMERGENCY MEDICINE | Admitting: EMERGENCY MEDICINE
Payer: COMMERCIAL

## 2021-06-03 VITALS
DIASTOLIC BLOOD PRESSURE: 78 MMHG | BODY MASS INDEX: 55.84 KG/M2 | WEIGHT: 285.94 LBS | TEMPERATURE: 98.7 F | SYSTOLIC BLOOD PRESSURE: 181 MMHG | HEART RATE: 94 BPM | OXYGEN SATURATION: 100 % | RESPIRATION RATE: 16 BRPM

## 2021-06-03 DIAGNOSIS — R52 BODY ACHES: ICD-10-CM
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
## 2021-06-03 PROCEDURE — 99284 EMERGENCY DEPT VISIT MOD MDM: CPT

## 2021-06-03 PROCEDURE — 99282 EMERGENCY DEPT VISIT SF MDM: CPT | Performed by: EMERGENCY MEDICINE

## 2021-06-03 RX ORDER — ACETAMINOPHEN 325 MG/1
650 TABLET ORAL ONCE
Status: COMPLETED | OUTPATIENT
Start: 2021-06-03 | End: 2021-06-03

## 2021-06-03 RX ADMIN — ACETAMINOPHEN 650 MG: 325 TABLET, FILM COATED ORAL at 19:53
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
## 2021-09-17 ENCOUNTER — OFFICE VISIT (OUTPATIENT)
Dept: FAMILY MEDICINE CLINIC | Facility: CLINIC | Age: 26
End: 2021-09-17
Payer: COMMERCIAL

## 2021-09-17 VITALS
TEMPERATURE: 97.3 F | OXYGEN SATURATION: 97 % | HEART RATE: 89 BPM | SYSTOLIC BLOOD PRESSURE: 120 MMHG | DIASTOLIC BLOOD PRESSURE: 80 MMHG | HEIGHT: 60 IN | WEIGHT: 269.9 LBS | RESPIRATION RATE: 16 BRPM | BODY MASS INDEX: 52.99 KG/M2

## 2021-09-17 DIAGNOSIS — H91.90 DECREASED HEARING, UNSPECIFIED LATERALITY: ICD-10-CM

## 2021-09-17 DIAGNOSIS — Z78.9 NEED FOR FOLLOW-UP BY SOCIAL WORKER: ICD-10-CM

## 2021-09-17 DIAGNOSIS — Z11.59 NEED FOR HEPATITIS C SCREENING TEST: ICD-10-CM

## 2021-09-17 DIAGNOSIS — L30.8 OTHER ECZEMA: ICD-10-CM

## 2021-09-17 DIAGNOSIS — Z11.4 SCREENING FOR HIV (HUMAN IMMUNODEFICIENCY VIRUS): ICD-10-CM

## 2021-09-17 DIAGNOSIS — Z00.00 ANNUAL PHYSICAL EXAM: Primary | ICD-10-CM

## 2021-09-17 DIAGNOSIS — Z13.220 ENCOUNTER FOR LIPID SCREENING FOR CARDIOVASCULAR DISEASE: ICD-10-CM

## 2021-09-17 DIAGNOSIS — R53.83 FATIGUE, UNSPECIFIED TYPE: ICD-10-CM

## 2021-09-17 DIAGNOSIS — Z23 ENCOUNTER FOR IMMUNIZATION: ICD-10-CM

## 2021-09-17 DIAGNOSIS — R04.0 FREQUENT EPISTAXIS: ICD-10-CM

## 2021-09-17 DIAGNOSIS — Z13.6 ENCOUNTER FOR LIPID SCREENING FOR CARDIOVASCULAR DISEASE: ICD-10-CM

## 2021-09-17 PROBLEM — L30.9 ECZEMA: Status: ACTIVE | Noted: 2021-09-17

## 2021-09-17 PROCEDURE — 90472 IMMUNIZATION ADMIN EACH ADD: CPT

## 2021-09-17 PROCEDURE — 1036F TOBACCO NON-USER: CPT | Performed by: FAMILY MEDICINE

## 2021-09-17 PROCEDURE — 3725F SCREEN DEPRESSION PERFORMED: CPT | Performed by: FAMILY MEDICINE

## 2021-09-17 PROCEDURE — 3008F BODY MASS INDEX DOCD: CPT | Performed by: FAMILY MEDICINE

## 2021-09-17 PROCEDURE — 99395 PREV VISIT EST AGE 18-39: CPT | Performed by: FAMILY MEDICINE

## 2021-09-17 PROCEDURE — 90471 IMMUNIZATION ADMIN: CPT

## 2021-09-17 PROCEDURE — 90686 IIV4 VACC NO PRSV 0.5 ML IM: CPT

## 2021-09-17 PROCEDURE — 90715 TDAP VACCINE 7 YRS/> IM: CPT

## 2021-09-17 RX ORDER — FENOPROFEN CALCIUM 200 MG
CAPSULE ORAL 2 TIMES DAILY
Qty: 118 ML | Refills: 0 | Status: SHIPPED | OUTPATIENT
Start: 2021-09-17 | End: 2022-04-25 | Stop reason: ALTCHOICE

## 2021-09-17 NOTE — PROGRESS NOTES
[address] [facility]    NAME: Tracy Odom  AGE: 31 y o  SEX: female  : 1995     DATE: 2021      Patient to come back for annual gyn exam     Assessment and Plan:     Problem List Items Addressed This Visit        Musculoskeletal and Integument    Eczema    Relevant Medications    hydrocortisone 1 % lotion      Other Visit Diagnoses     Annual physical exam    -  Primary    Encounter for lipid screening for cardiovascular disease        Relevant Orders    Lipid panel    BMI 50 0-59 9, adult ([facility] [address] )        Relevant Orders    Hemoglobin A1C    Comprehensive metabolic panel    Ambulatory referral to Weight Management    Fatigue, unspecified type        Relevant Orders    TSH, 3rd generation with Free T4 reflex    Vitamin D 25 hydroxy    Screening for HIV (human immunodeficiency virus)        Relevant Orders    HIV 1/2 Antigen/Antibody (4th Generation) w Reflex [facility]    Need for hepatitis C screening test        Relevant Orders    Hepatitis C antibody    Frequent epistaxis        Relevant Orders    Ambulatory Referral to Otolaryngology    CBC and Platelet    Need for follow-up by         Relevant Orders    Ambulatory referral to social work care management program    Decreased hearing, unspecified laterality        Encounter for immunization        Relevant Orders    influenza vaccine, quadrivalent, 0 5 mL, preservative-free, for adult and pediatric patients 6 mos+ (AFLURIA, FLUARIX, FLULAVAL, FLUZONE) (Completed)    TDAP VACCINE GREATER THAN OR EQUAL TO 8YO IM (Completed)      Discussed with patient the benefits, contraindications and side effects of the following vaccines: Tetanus, Diphtheria, Pertussis or Influenza   Discussed 4 components of the vaccine/s  Immunizations and preventive care screenings were discussed with patient today   Appropriate education was printed on patient's after visit summary  Counseling:  Exercise: the importance of regular exercise/physical activity was discussed  Recommend exercise 3-5 times per week for at least 30 minutes  · Discussed healthy eating and weight loss     BMI Counseling: Body mass index is 52 71 kg/m²  The BMI is above normal  Nutrition recommendations include encouraging healthy choices of fruits and vegetables, decreasing fast food intake and limiting drinks that contain sugar  Exercise recommendations include moderate physical activity 150 minutes/week, vigorous physical activity 75 minutes/week and exercising 3-5 times per week  Patient referred to weight management  Rationale for BMI follow-up plan is due to patient being overweight or obese  Depression Screening and Follow-up Plan:   Patient was screened for depression during today's encounter  They screened negative with a PHQ-2 score of 0  Return in about 4 weeks (around 10/15/2021), or needs annual GYN with Dr Maricarmen Henley  Chief Complaint:     Chief Complaint   Patient presents with    Annual Exam     Has been dealing with bad nose bleeds, got worse recently  Has been feeling tired no matter how long she sleeps  Excema has been flaring up since April  Not sure why hearing doesn't seem normal lately  History of Present Illness:     Adult Annual Physical   Patient here for a comprehensive physical exam  The patient reports fatigue, eczema, frequent nose bleeds   Diet and Physical Activity  · Diet/Nutrition: diabetic diet and lots of carbs, no sugar drinks   · Exercise: walking  Depression Screening  PHQ-9 Depression Screening    PHQ-9:   Frequency of the following problems over the past two weeks:      Little interest or pleasure in doing things: 0 - not at all  Feeling down, depressed, or hopeless: 0 - not at all  PHQ-2 Score: 0       General Health  · Sleep: difficulty getting back to sleep  · Hearing: decreased hearing  · Vision: goes for regular eye exams  · Dental: regular dental visits  /GYN Health  · Last menstrual period:   · Contraceptive method: not sexually active  · History of STDs?: no      Review of Systems:     Review of Systems   Constitutional: Positive for fatigue  Negative for chills and fever  HENT: Positive for hearing loss  Negative for congestion, dental problem, ear discharge, ear pain, rhinorrhea, sinus pressure, sinus pain, sneezing, sore throat, trouble swallowing and voice change  Respiratory: Negative for cough, chest tightness and shortness of breath  Cardiovascular: Negative for chest pain, palpitations and leg swelling  Gastrointestinal: Negative for abdominal pain, diarrhea, nausea and vomiting  Genitourinary: Negative for difficulty urinating, dysuria, frequency, vaginal bleeding, vaginal discharge and vaginal pain  Skin: Positive for rash (eczema)  Neurological: Negative for dizziness, seizures, syncope, weakness and headaches  Hematological:        Frequent nose bleeds     Psychiatric/Behavioral: Negative for dysphoric mood, sleep disturbance and suicidal ideas  The patient is not nervous/anxious         Past Medical History:     Past Medical History:   Diagnosis Date    Urinary tract infection       Past Surgical History:     Past Surgical History:   Procedure Laterality Date     SECTION   and       Social History:     Social History     Tobacco Use    Smoking status: Former Smoker    Smokeless tobacco: Never Used    Tobacco comment: quit 4 5 year ago   Vaping Use    Vaping Use: Never used   Substance Use Topics    Alcohol use: Yes     Comment: social     Drug use: Never        Family History:     Family History   Problem Relation Age of Onset    Lung cancer Mother     Thyroid disease Father       Current Medications:     Current Outpatient Medications   Medication Sig Dispense Refill    hydrocortisone 1 % lotion Apply topically 2 (two) times a day 118 mL 0     No current facility-administered medications for this visit  Allergies: Allergies   Allergen Reactions    Other Hives     SOME shellfish       Physical Exam:     /80 (BP Location: Left arm, Patient Position: Sitting, Cuff Size: Large)   Pulse 89   Temp (!) 97 3 °F (36 3 °C) (Tympanic)   Resp 16   Ht 5' (1 524 m)   Wt 122 kg (269 lb 14 4 oz)   SpO2 97%   BMI 52 71 kg/m²     Physical Exam  Constitutional:       General: She is not in acute distress  Appearance: She is obese  She is not ill-appearing  HENT:      Head: Normocephalic and atraumatic  Right Ear: Tympanic membrane, ear canal and external ear normal  There is no impacted cerumen  Left Ear: Tympanic membrane, ear canal and external ear normal  There is no impacted cerumen  Ears:      Comments: Decreased hearing in right ear     Nose: Nose normal       Mouth/Throat:      Mouth: Mucous membranes are moist       Pharynx: Oropharynx is clear  No oropharyngeal exudate  Eyes:      General: No scleral icterus  Right eye: No discharge  Left eye: No discharge  Pupils: Pupils are equal, round, and reactive to light  Cardiovascular:      Rate and Rhythm: Normal rate and regular rhythm  Pulses: Normal pulses  Heart sounds: No murmur heard  Pulmonary:      Effort: Pulmonary effort is normal  No respiratory distress  Breath sounds: Normal breath sounds  No wheezing  Abdominal:      General: Abdomen is flat  There is no distension  Tenderness: There is no abdominal tenderness  Musculoskeletal:         General: No swelling, tenderness, deformity or signs of injury  Right lower leg: No edema  Left lower leg: No edema  Skin:     General: Skin is warm and dry  Coloration: Skin is not jaundiced  Findings: Rash (Eczema noted on extensor surfaces of  forearms) present  No bruising or lesion  Neurological:      General: No focal deficit present        Mental Status: She is alert and oriented to person, place, and time  Cranial Nerves: No cranial nerve deficit  Sensory: No sensory deficit  Motor: No weakness  Coordination: Coordination normal       Gait: Gait normal       Deep Tendon Reflexes: Reflexes normal    Psychiatric:         Mood and Affect: Mood normal          Behavior: Behavior normal          Thought Content:  Thought content normal          Judgment: Judgment normal           Marta Ortega DO   [address]

## 2021-09-17 NOTE — PATIENT INSTRUCTIONS

## 2021-09-23 ENCOUNTER — PATIENT OUTREACH (OUTPATIENT)
Dept: FAMILY MEDICINE CLINIC | Facility: CLINIC | Age: 26
End: 2021-09-23

## 2021-09-23 LAB
ERYTHROCYTE [DISTWIDTH] IN BLOOD BY AUTOMATED COUNT: 13.2 % (ref 11.7–15.4)
HCT VFR BLD AUTO: 40.3 % (ref 34–46.6)
HGB BLD-MCNC: 13.5 G/DL (ref 11.1–15.9)
MCH RBC QN AUTO: 27.4 PG (ref 26.6–33)
MCHC RBC AUTO-ENTMCNC: 33.5 G/DL (ref 31.5–35.7)
MCV RBC AUTO: 82 FL (ref 79–97)
PLATELET # BLD AUTO: 385 X10E3/UL (ref 150–450)
RBC # BLD AUTO: 4.93 X10E6/UL (ref 3.77–5.28)
WBC # BLD AUTO: 4.7 X10E3/UL (ref 3.4–10.8)

## 2021-09-23 NOTE — PROGRESS NOTES
Received referral from PCP for social work outreach  Pt reportedly needs help with her health insurance and needs to figure out if and how to change plans  I called pt and introduced self and role in care management  Pt said she has Medicaid and [employer]  She has trouble finding doctors the best doctors under each of these plans and had questions about whether or not to switch or look for doctors under the plans  looked online at [employer] site  I informed pt that the best thing to do would be to look under [employer] website where she can speak with an , and there is also a link on the site for if you're interested in switching plans  Pt said she already spoke to someone at [facility] and they weren't helpful  I then suggested she conact [place] Medicaid at the [facility]  Pt said she will try and figure it out and look on here  Pt had no other concerns or questions aside from this

## 2021-09-24 ENCOUNTER — TELEPHONE (OUTPATIENT)
Dept: FAMILY MEDICINE CLINIC | Facility: CLINIC | Age: 26
End: 2021-09-24

## 2021-09-24 LAB
25(OH)D3+25(OH)D2 SERPL-MCNC: 19 NG/ML (ref 30–100)
ALBUMIN SERPL-MCNC: 4.5 G/DL (ref 3.9–5)
ALBUMIN/GLOB SERPL: 1.7 {RATIO} (ref 1.2–2.2)
ALP SERPL-CCNC: 77 IU/L (ref 44–121)
ALT SERPL-CCNC: 21 IU/L (ref 0–32)
AST SERPL-CCNC: 21 IU/L (ref 0–40)
BILIRUB SERPL-MCNC: 0.3 MG/DL (ref 0–1.2)
BUN SERPL-MCNC: 9 MG/DL (ref 6–20)
BUN/CREAT SERPL: 11 (ref 9–23)
CALCIUM SERPL-MCNC: 9.7 MG/DL (ref 8.7–10.2)
CHLORIDE SERPL-SCNC: 102 MMOL/L (ref 96–106)
CHOLEST SERPL-MCNC: 169 MG/DL (ref 100–199)
CHOLEST/HDLC SERPL: 3.9 RATIO (ref 0–4.4)
CO2 SERPL-SCNC: 23 MMOL/L (ref 20–29)
CREAT SERPL-MCNC: 0.84 MG/DL (ref 0.57–1)
EST. AVERAGE GLUCOSE BLD GHB EST-MCNC: 105 MG/DL
GLOBULIN SER-MCNC: 2.6 G/DL (ref 1.5–4.5)
GLUCOSE SERPL-MCNC: 84 MG/DL (ref 65–99)
HBA1C MFR BLD: 5.3 % (ref 4.8–5.6)
HCV AB S/CO SERPL IA: <0.1 S/CO RATIO (ref 0–0.9)
HDLC SERPL-MCNC: 43 MG/DL
HIV 1+2 AB+HIV1 P24 AG SERPL QL IA: NON REACTIVE
LDLC SERPL CALC-MCNC: 110 MG/DL (ref 0–99)
POTASSIUM SERPL-SCNC: 4.8 MMOL/L (ref 3.5–5.2)
PROT SERPL-MCNC: 7.1 G/DL (ref 6–8.5)
SL AMB EGFR AFRICAN AMERICAN: 110 ML/MIN/1.73
SL AMB EGFR NON AFRICAN AMERICAN: 96 ML/MIN/1.73
SL AMB VLDL CHOLESTEROL CALC: 16 MG/DL (ref 5–40)
SODIUM SERPL-SCNC: 138 MMOL/L (ref 134–144)
TRIGL SERPL-MCNC: 84 MG/DL (ref 0–149)
TSH SERPL DL<=0.005 MIU/L-ACNC: 2.04 UIU/ML (ref 0.45–4.5)

## 2021-09-24 NOTE — TELEPHONE ENCOUNTER
Discussed lab work with patient  Patient aware that her LDL is mildly elevated and we discussed healthy lifestyle changes she could implement to correct this  Also made her aware that her vitamin-D level is low at 19  I have put in vitamin-D supplements of 1000 units to be taken daily for 3 months and we will recheck  Patient verbalizes understanding

## 2021-12-06 ENCOUNTER — OFFICE VISIT (OUTPATIENT)
Dept: OTOLARYNGOLOGY | Facility: CLINIC | Age: 26
End: 2021-12-06
Payer: COMMERCIAL

## 2021-12-06 ENCOUNTER — OFFICE VISIT (OUTPATIENT)
Dept: BARIATRICS | Facility: CLINIC | Age: 26
End: 2021-12-06
Payer: COMMERCIAL

## 2021-12-06 VITALS
WEIGHT: 268 LBS | SYSTOLIC BLOOD PRESSURE: 120 MMHG | DIASTOLIC BLOOD PRESSURE: 84 MMHG | HEIGHT: 61 IN | BODY MASS INDEX: 50.6 KG/M2 | HEART RATE: 84 BPM

## 2021-12-06 VITALS — TEMPERATURE: 97.5 F | WEIGHT: 268 LBS | HEIGHT: 61 IN | BODY MASS INDEX: 50.6 KG/M2

## 2021-12-06 DIAGNOSIS — E66.01 MORBID OBESITY WITH BMI OF 50.0-59.9, ADULT (HCC): Primary | ICD-10-CM

## 2021-12-06 DIAGNOSIS — J34.89 NASAL VESTIBULITIS: ICD-10-CM

## 2021-12-06 DIAGNOSIS — Z01.10 ENCOUNTER FOR HEARING EXAMINATION WITHOUT ABNORMAL FINDINGS: ICD-10-CM

## 2021-12-06 DIAGNOSIS — R04.0 FREQUENT EPISTAXIS: ICD-10-CM

## 2021-12-06 DIAGNOSIS — R04.0 RECURRENT EPISTAXIS: Primary | ICD-10-CM

## 2021-12-06 PROBLEM — E55.9 VITAMIN D DEFICIENCY: Status: ACTIVE | Noted: 2021-12-06

## 2021-12-06 PROCEDURE — 30901 CONTROL OF NOSEBLEED: CPT | Performed by: STUDENT IN AN ORGANIZED HEALTH CARE EDUCATION/TRAINING PROGRAM

## 2021-12-06 PROCEDURE — 99203 OFFICE O/P NEW LOW 30 MIN: CPT | Performed by: STUDENT IN AN ORGANIZED HEALTH CARE EDUCATION/TRAINING PROGRAM

## 2021-12-06 PROCEDURE — 99204 OFFICE O/P NEW MOD 45 MIN: CPT | Performed by: PHYSICIAN ASSISTANT

## 2021-12-20 ENCOUNTER — OFFICE VISIT (OUTPATIENT)
Dept: AUDIOLOGY | Facility: CLINIC | Age: 26
End: 2021-12-20
Payer: COMMERCIAL

## 2021-12-20 ENCOUNTER — OFFICE VISIT (OUTPATIENT)
Dept: OTOLARYNGOLOGY | Facility: CLINIC | Age: 26
End: 2021-12-20
Payer: COMMERCIAL

## 2021-12-20 VITALS — TEMPERATURE: 97.2 F | WEIGHT: 268 LBS | HEIGHT: 61 IN | BODY MASS INDEX: 50.6 KG/M2

## 2021-12-20 DIAGNOSIS — H90.3 SENSORY HEARING LOSS, BILATERAL: Primary | ICD-10-CM

## 2021-12-20 DIAGNOSIS — Z01.10 ENCOUNTER FOR HEARING EXAMINATION WITHOUT ABNORMAL FINDINGS: ICD-10-CM

## 2021-12-20 DIAGNOSIS — J34.89 NASAL VESTIBULITIS: ICD-10-CM

## 2021-12-20 DIAGNOSIS — R04.0 RECURRENT EPISTAXIS: Primary | ICD-10-CM

## 2021-12-20 PROCEDURE — 92557 COMPREHENSIVE HEARING TEST: CPT | Performed by: AUDIOLOGIST

## 2021-12-20 PROCEDURE — 92567 TYMPANOMETRY: CPT | Performed by: AUDIOLOGIST

## 2021-12-20 PROCEDURE — 1036F TOBACCO NON-USER: CPT | Performed by: NURSE PRACTITIONER

## 2021-12-20 PROCEDURE — 99213 OFFICE O/P EST LOW 20 MIN: CPT | Performed by: NURSE PRACTITIONER

## 2021-12-29 ENCOUNTER — PREP FOR PROCEDURE (OUTPATIENT)
Dept: BARIATRICS | Facility: CLINIC | Age: 26
End: 2021-12-29

## 2021-12-29 ENCOUNTER — CLINICAL SUPPORT (OUTPATIENT)
Dept: BARIATRICS | Facility: CLINIC | Age: 26
End: 2021-12-29

## 2021-12-29 VITALS
SYSTOLIC BLOOD PRESSURE: 120 MMHG | HEIGHT: 61 IN | HEART RATE: 99 BPM | DIASTOLIC BLOOD PRESSURE: 70 MMHG | BODY MASS INDEX: 50.07 KG/M2 | WEIGHT: 265.2 LBS

## 2021-12-29 DIAGNOSIS — E66.01 MORBID (SEVERE) OBESITY DUE TO EXCESS CALORIES (HCC): Primary | ICD-10-CM

## 2021-12-29 DIAGNOSIS — Z98.84 BARIATRIC SURGERY STATUS: Primary | ICD-10-CM

## 2021-12-29 DIAGNOSIS — E66.01 MORBID OBESITY (HCC): Primary | ICD-10-CM

## 2021-12-29 PROCEDURE — RECHECK

## 2021-12-30 ENCOUNTER — OFFICE VISIT (OUTPATIENT)
Dept: BARIATRICS | Facility: CLINIC | Age: 26
End: 2021-12-30

## 2021-12-30 VITALS — WEIGHT: 264.8 LBS | BODY MASS INDEX: 49.99 KG/M2 | HEIGHT: 61 IN

## 2021-12-30 DIAGNOSIS — E66.01 MORBID (SEVERE) OBESITY DUE TO EXCESS CALORIES (HCC): Primary | ICD-10-CM

## 2021-12-30 PROCEDURE — 3008F BODY MASS INDEX DOCD: CPT | Performed by: NURSE PRACTITIONER

## 2021-12-30 PROCEDURE — RECHECK

## 2022-01-24 ENCOUNTER — OFFICE VISIT (OUTPATIENT)
Dept: BARIATRICS | Facility: CLINIC | Age: 27
End: 2022-01-24

## 2022-01-24 VITALS — WEIGHT: 266.6 LBS | BODY MASS INDEX: 50.37 KG/M2

## 2022-01-24 DIAGNOSIS — E66.01 MORBID (SEVERE) OBESITY DUE TO EXCESS CALORIES (HCC): Primary | ICD-10-CM

## 2022-01-24 PROCEDURE — RECHECK

## 2022-01-24 NOTE — PATIENT INSTRUCTIONS
- plan and prep meals the night before for easy access in the morning  - plan ahead for meals before going grocery shopping  - write down meals/recipes that you enjoy to have later  - set reminders and timers for self care  - find non-food coping skills to manage stress instead of food  - work in activity as toleratede

## 2022-01-24 NOTE — PROGRESS NOTES
Patient presents for 2 of 6 weight check, current weight 266 6lbs  Eating behaviors/food choices: Patient was relieved to learn she lost some weight, she has been doing some emotional eating and not able to focus on her weight management journey as closely as she had been due to stress  She has been forgetting to plan and prep food the night before, relying on take out at times  Encouraged to set reminders, post signs to remind her to make meals for herself, pack a cooler with quick items to have a work instead of relying on take out  Activity/Exercise:  Patient has tried to go for walks but it's been cold  Patient doesn't have any equipment to use indoors but may look up workout susan  Suggested she look at kid workout or yoga videos to do with her kids  Sleep/Rest:  Patient's sleep has always been poor  She wakes because of her kids or she may sleep through the night but just not feel rested  Patient has appointment March for sleep consult  Mental Health/Wellness:  Patient experiencing stress with her daughter's father, trying to focus on caring for her kids so she's forgetting to manage her own self care  Encouraged to reflect on her own needs and how to take care of them  Encouraged to set reminders for herself to make healthy meals, be mindful of mood and impact on food choices, find nonfood coping skills to manage      Workflow review:    Labs and PCP: needs labs, PCP done  Psych and EGD: no eval needed, EGD 3/18/2022  Nicotine: NA  Support Group: needs to complete, has schedule   Cardiology: scheduled 3/28  Sleep: with pulmonary at [facility], 3/16  Weight and Weight Checks: six weight checks, goal weight is 252lbs    Goals:    - plan and prep meals the night before for easy access in the morning  - plan ahead for meals before going grocery shopping  - write down meals/recipes that you enjoy to have later  - set reminders and timers for self care  - find non-food coping skills to manage stress instead of food  - work in activity as toleratede      Next Appointment:  Weight check 3 of 6 with surgeon on 2/18, 4 of 6 with RD on 3/21

## 2022-02-18 ENCOUNTER — OFFICE VISIT (OUTPATIENT)
Dept: BARIATRICS | Facility: CLINIC | Age: 27
End: 2022-02-18
Payer: COMMERCIAL

## 2022-02-18 VITALS
BODY MASS INDEX: 50.26 KG/M2 | HEART RATE: 89 BPM | DIASTOLIC BLOOD PRESSURE: 78 MMHG | WEIGHT: 266.2 LBS | SYSTOLIC BLOOD PRESSURE: 122 MMHG | HEIGHT: 61 IN | RESPIRATION RATE: 14 BRPM

## 2022-02-18 DIAGNOSIS — E66.01 MORBID (SEVERE) OBESITY DUE TO EXCESS CALORIES (HCC): ICD-10-CM

## 2022-02-18 DIAGNOSIS — Z01.818 ENCOUNTER FOR OTHER PREPROCEDURAL EXAMINATION: Primary | ICD-10-CM

## 2022-02-18 DIAGNOSIS — R04.0 RECURRENT EPISTAXIS: ICD-10-CM

## 2022-02-18 DIAGNOSIS — E66.01 MORBID OBESITY WITH BMI OF 50.0-59.9, ADULT (HCC): ICD-10-CM

## 2022-02-18 DIAGNOSIS — E78.5 HYPERLIPIDEMIA: ICD-10-CM

## 2022-02-18 PROCEDURE — 99204 OFFICE O/P NEW MOD 45 MIN: CPT | Performed by: SURGERY

## 2022-02-18 PROCEDURE — 3008F BODY MASS INDEX DOCD: CPT | Performed by: SURGERY

## 2022-02-18 PROCEDURE — 1036F TOBACCO NON-USER: CPT | Performed by: SURGERY

## 2022-02-18 RX ORDER — MELATONIN
5000 DAILY
COMMUNITY
End: 2022-03-14

## 2022-02-18 NOTE — PROGRESS NOTES
BARIATRIC INITIAL CONSULT - BARIATRIC SURGERY    Kurt Muro 31 y o  female MRN: [id number]  Unit/Bed#:  Encounter: [id number]      HPI:  Kurt Muro is a 31 y o  female who presents with a longstanding history of morbid obesity and inability to sustain a meaningful weight loss  She is a pharmacy technician at [employer]  She desires to pursue metabolic and bariatric surgery to improve her health  +GERD  Rare NSAIDs  No DVT/PE  Quit tobacco 2017  Here today to discuss bariatric options  Visit type: initial visit    Symptoms: inability to loss weight    Associated Symptoms: none    Associated Conditions: elevated LDL  Disease Complications: none  Weight Loss Interest: high    Exercise Frequency:daily  Types of Exercise: walking      Review of Systems   Gastrointestinal:        Reflux   Hematological: Bruises/bleeds easily (nose bleeds)  All other systems reviewed and are negative        Historical Information   Past Medical History:   Diagnosis Date    Anxiety     Morbid obesity with BMI of 50 0-59 9, adult ([facility] [address] )     Urinary tract infection      Past Surgical History:   Procedure Laterality Date     SECTION   and      Social History   Social History     Substance and Sexual Activity   Alcohol Use Not Currently    Alcohol/week: 0 0 standard drinks    Comment: I will have a drink 1-2 times a year     Social History     Substance and Sexual Activity   Drug Use Not Currently    Types: Marijuana    Comment: I smoked a little in highschool     Social History     Tobacco Use   Smoking Status Former Smoker    Packs/day: 0 25    Years: 5 00    Pack years: 1 25    Types: Cigarettes    Quit date: 6/10/2016    Years since quittin 6   Smokeless Tobacco Never Used   Tobacco Comment    I smoked on and off not 5 years straight     Family History: non-contributory    Meds/Allergies   all medications and allergies reviewed  Allergies   Allergen Reactions    Other Hives     SOME shellfish        Objective       Current Vitals:   /78 (BP Location: Right arm, Patient Position: Sitting, Cuff Size: Large)   Pulse 89   Resp 14   Ht 5' 1" (1 549 m)   Wt 121 kg (266 lb 3 2 oz)   BMI 50 30 kg/m²       Invasive Devices  Report    None                 Physical Exam  Constitutional:       Appearance: Normal appearance  HENT:      Head: Atraumatic  Nose: No rhinorrhea  Eyes:      Extraocular Movements: Extraocular movements intact  Cardiovascular:      Rate and Rhythm: Normal rate  Pulmonary:      Effort: Pulmonary effort is normal  No respiratory distress  Abdominal:      General: Abdomen is flat  There is no distension  Palpations: Abdomen is soft  Tenderness: There is no abdominal tenderness  Musculoskeletal:         General: Normal range of motion  Cervical back: Normal range of motion  Skin:     General: Skin is warm and dry  Neurological:      General: No focal deficit present  Mental Status: She is alert and oriented to person, place, and time  Psychiatric:         Mood and Affect: Mood normal          Behavior: Behavior normal          Lab Results: I have personally reviewed pertinent lab results  Imaging: I have personally reviewed pertinent reports  EKG, Pathology, and Other Studies: I have personally reviewed pertinent reports  Assessment/PLAN:    31 y o  yo female with a long standing h/o of obesity and inability to sustain any meaningful weight loss on her own despite several attempts  She is interested in the Laparoscopic nara-en-y gastric bypass  As a part of her pre op evaluation, she will be referred to a cardiologist and for a sleep evaluation and consult after successfully completing an evaluation with our pre-certification/, registered dietician and licensed clinical   She needs an EGD to evaluate the anatomy of her GI tract prior to the operation    I have spent over 45 minutes with her face to face in the office today discussing her options and details of the surgery  We have seen an animation of the surgery on the computer that illustrates how the operation is done and how the anatomy will be altered with the procedure  Over 50% of this was coordinating care  She was given the opportunity to ask questions and I have answered all of them  I have discussed and educated the patient with regards to the components of our multidisciplinary program and the importance of compliance and follow up in the post operative period  The patient was also instructed with regards to the importance of behavior modification, nutritional counseling, support meeting attendance and lifestyle changes that are important to ensure success  Although there is a great statistical chance of improvement or even resolution of most of her associated comorbidities, the results vary from patient to patient and they largely depend on her commitment and compliance  Weight loss goal will be determined at the time of her evaluation      Liya Leon MD  2/18/2022  3:45 PM

## 2022-02-18 NOTE — LETTER
2022     Valeria Alejandra, [address]    Patient: Chari Still   YOB: 1995   Date of Visit: 2022       Dear Dr Stephanie Bhandari: Thank you for referring Chari Still to me for evaluation for metabolic and bariatric surgery  Below are my notes for this consultation  If you have questions, please do not hesitate to call me  I look forward to following your patient along with you  Sincerely,        Rusty Sutton MD        CC: No Recipients  Rusty Sutton MD  2022  3:49 PM  Sign when Signing Visit      [address] [facility] [address] [facility] 31 y o  female MRN: [id number]  Unit/Bed#:  Encounter: [id number]      HPI:  Chari Still is a 31 y o  female who presents with a longstanding history of morbid obesity and inability to sustain a meaningful weight loss  She is a pharmacy technician at [employer]  She desires to pursue metabolic and bariatric surgery to improve her health  +GERD  Rare NSAIDs  No DVT/PE  Quit tobacco 2017  Here today to discuss bariatric options  Visit type: initial visit    Symptoms: inability to loss weight    Associated Symptoms: none    Associated Conditions: elevated LDL  Disease Complications: none  Weight Loss Interest: high    Exercise Frequency:daily  Types of Exercise: walking      Review of Systems   Gastrointestinal:        Reflux   Hematological: Bruises/bleeds easily (nose bleeds)  All other systems reviewed and are negative        Historical Information   Past Medical History:   Diagnosis Date    Anxiety     Morbid obesity with BMI of 50 0-59 9, adult ([address] )     Urinary tract infection      Past Surgical History:   Procedure Laterality Date     SECTION   and 2016     Social History   Social History     Substance and Sexual Activity   Alcohol Use Not Currently    Alcohol/week: 0 0 standard drinks    Comment: I will have a drink 1-2 times a year     Social History     Substance and Sexual Activity   Drug Use Not Currently    Types: Marijuana    Comment: I smoked a little in highschool     Social History     Tobacco Use   Smoking Status Former Smoker    Packs/day: 0 25    Years: 5 00    Pack years: 1 25    Types: Cigarettes    Quit date: 6/10/2016    Years since quittin 6   Smokeless Tobacco Never Used   Tobacco Comment    I smoked on and off not 5 years straight     Family History: non-contributory    Meds/Allergies   all medications and allergies reviewed  Allergies   Allergen Reactions    Other Hives     SOME shellfish        Objective       Current Vitals:   /78 (BP Location: Right arm, Patient Position: Sitting, Cuff Size: Large)   Pulse 89   Resp 14   Ht 5' 1" (1 549 m)   Wt 121 kg (266 lb 3 2 oz)   BMI 50 30 kg/m²       Invasive Devices  Report    None                 Physical Exam  Constitutional:       Appearance: Normal appearance  HENT:      Head: Atraumatic  Nose: No rhinorrhea  Eyes:      Extraocular Movements: Extraocular movements intact  Cardiovascular:      Rate and Rhythm: Normal rate  Pulmonary:      Effort: Pulmonary effort is normal  No respiratory distress  Abdominal:      General: Abdomen is flat  There is no distension  Palpations: Abdomen is soft  Tenderness: There is no abdominal tenderness  Musculoskeletal:         General: Normal range of motion  Cervical back: Normal range of motion  Skin:     General: Skin is warm and dry  Neurological:      General: No focal deficit present  Mental Status: She is alert and oriented to person, place, and time  Psychiatric:         Mood and Affect: Mood normal          Behavior: Behavior normal          Lab Results: I have personally reviewed pertinent lab results  Imaging: I have personally reviewed pertinent reports  EKG, Pathology, and Other Studies: I have personally reviewed pertinent reports  Assessment/PLAN:    31 y o  yo female with a long standing h/o of obesity and inability to sustain any meaningful weight loss on her own despite several attempts  She is interested in the Laparoscopic nara-en-y gastric bypass  As a part of her pre op evaluation, she will be referred to a cardiologist and for a sleep evaluation and consult after successfully completing an evaluation with our pre-certification/, registered dietician and licensed clinical   She needs an EGD to evaluate the anatomy of her GI tract prior to the operation  I have spent over 45 minutes with her face to face in the office today discussing her options and details of the surgery  We have seen an animation of the surgery on the computer that illustrates how the operation is done and how the anatomy will be altered with the procedure  Over 50% of this was coordinating care  She was given the opportunity to ask questions and I have answered all of them  I have discussed and educated the patient with regards to the components of our multidisciplinary program and the importance of compliance and follow up in the post operative period  The patient was also instructed with regards to the importance of behavior modification, nutritional counseling, support meeting attendance and lifestyle changes that are important to ensure success  Although there is a great statistical chance of improvement or even resolution of most of her associated comorbidities, the results vary from patient to patient and they largely depend on her commitment and compliance  Weight loss goal will be determined at the time of her evaluation      Jeremiah Flannery MD  2/18/2022  3:45 PM

## 2022-03-18 ENCOUNTER — ANESTHESIA (OUTPATIENT)
Dept: PERIOP | Facility: HOSPITAL | Age: 27
End: 2022-03-18

## 2022-03-18 ENCOUNTER — HOSPITAL ENCOUNTER (OUTPATIENT)
Dept: PERIOP | Facility: HOSPITAL | Age: 27
Setting detail: OUTPATIENT SURGERY
Discharge: HOME | End: 2022-03-18
Attending: SURGERY | Admitting: SURGERY
Payer: COMMERCIAL

## 2022-03-18 ENCOUNTER — ANESTHESIA EVENT (OUTPATIENT)
Dept: PERIOP | Facility: HOSPITAL | Age: 27
End: 2022-03-18

## 2022-03-18 VITALS
RESPIRATION RATE: 18 BRPM | BODY MASS INDEX: 51.05 KG/M2 | TEMPERATURE: 97.5 F | HEART RATE: 77 BPM | OXYGEN SATURATION: 98 % | WEIGHT: 270.2 LBS | SYSTOLIC BLOOD PRESSURE: 95 MMHG | DIASTOLIC BLOOD PRESSURE: 50 MMHG

## 2022-03-18 DIAGNOSIS — E66.01 MORBID OBESITY (HCC): ICD-10-CM

## 2022-03-18 PROBLEM — F41.9 ANXIETY: Status: ACTIVE | Noted: 2022-03-18

## 2022-03-18 LAB
EXT PREGNANCY TEST URINE: NEGATIVE
EXT. CONTROL: NORMAL

## 2022-03-18 PROCEDURE — 81025 URINE PREGNANCY TEST: CPT | Performed by: ANESTHESIOLOGY

## 2022-03-18 PROCEDURE — 88305 TISSUE EXAM BY PATHOLOGIST: CPT | Performed by: PATHOLOGY

## 2022-03-18 PROCEDURE — 43239 EGD BIOPSY SINGLE/MULTIPLE: CPT | Performed by: SURGERY

## 2022-03-18 RX ORDER — LIDOCAINE HYDROCHLORIDE 10 MG/ML
INJECTION, SOLUTION EPIDURAL; INFILTRATION; INTRACAUDAL; PERINEURAL AS NEEDED
Status: DISCONTINUED | OUTPATIENT
Start: 2022-03-18 | End: 2022-03-18

## 2022-03-18 RX ORDER — SODIUM CHLORIDE, SODIUM LACTATE, POTASSIUM CHLORIDE, CALCIUM CHLORIDE 600; 310; 30; 20 MG/100ML; MG/100ML; MG/100ML; MG/100ML
INJECTION, SOLUTION INTRAVENOUS CONTINUOUS PRN
Status: DISCONTINUED | OUTPATIENT
Start: 2022-03-18 | End: 2022-03-18

## 2022-03-18 RX ORDER — SODIUM CHLORIDE, SODIUM LACTATE, POTASSIUM CHLORIDE, CALCIUM CHLORIDE 600; 310; 30; 20 MG/100ML; MG/100ML; MG/100ML; MG/100ML
75 INJECTION, SOLUTION INTRAVENOUS CONTINUOUS
Status: DISCONTINUED | OUTPATIENT
Start: 2022-03-18 | End: 2022-03-22 | Stop reason: HOSPADM

## 2022-03-18 RX ORDER — PROPOFOL 10 MG/ML
INJECTION, EMULSION INTRAVENOUS AS NEEDED
Status: DISCONTINUED | OUTPATIENT
Start: 2022-03-18 | End: 2022-03-18

## 2022-03-18 RX ADMIN — SODIUM CHLORIDE, SODIUM LACTATE, POTASSIUM CHLORIDE, AND CALCIUM CHLORIDE 75 ML/HR: .6; .31; .03; .02 INJECTION, SOLUTION INTRAVENOUS at 09:06

## 2022-03-18 RX ADMIN — PROPOFOL 50 MG: 10 INJECTION, EMULSION INTRAVENOUS at 10:01

## 2022-03-18 RX ADMIN — LIDOCAINE HYDROCHLORIDE 50 MG: 10 INJECTION, SOLUTION EPIDURAL; INFILTRATION; INTRACAUDAL; PERINEURAL at 09:59

## 2022-03-18 RX ADMIN — PROPOFOL 50 MG: 10 INJECTION, EMULSION INTRAVENOUS at 10:02

## 2022-03-18 RX ADMIN — PROPOFOL 150 MG: 10 INJECTION, EMULSION INTRAVENOUS at 09:59

## 2022-03-18 RX ADMIN — SODIUM CHLORIDE, SODIUM LACTATE, POTASSIUM CHLORIDE, AND CALCIUM CHLORIDE: .6; .31; .03; .02 INJECTION, SOLUTION INTRAVENOUS at 09:47

## 2022-03-18 RX ADMIN — PROPOFOL 50 MG: 10 INJECTION, EMULSION INTRAVENOUS at 10:00

## 2022-03-18 NOTE — ANESTHESIA POSTPROCEDURE EVALUATION
Post-Op Assessment Note    CV Status:  Stable  Pain Score: 0    Pain management: adequate     Mental Status:  Alert   Hydration Status:  Stable   PONV Controlled:  None   Airway Patency:  Patent   Two or more mitigation strategies used for obstructive sleep apnea   Post Op Vitals Reviewed: Yes            No complications documented      BP   149/75   Temp      Pulse  85   Resp 16   SpO2 99

## 2022-03-18 NOTE — H&P
This is a 31 y o  female with a history of morbid obesity and Body mass index is 51 05 kg/m²  Here for an EGD to evaluate the anatomy of the GI tract and to rule out the presence of H  pylori  Physical Exam    /65   Pulse 79   Temp 97 5 °F (36 4 °C) (Temporal)   Resp 18   Wt 123 kg (270 lb 3 2 oz)   SpO2 98%   BMI 51 05 kg/m²    AAOx3  RRR  CTA B  Abdomen obese  Benign  A/P:    This is a 31 y o  female with a history of morbid obesity and Body mass index is 51 05 kg/m²       Will proceed with the EGD and biopsies        Shira Cormier MD  3/18/2022  9:31 AM

## 2022-03-18 NOTE — ANESTHESIA PREPROCEDURE EVALUATION
Procedure:  EGD    Relevant Problems   NEURO/PSYCH   (+) Anxiety      Other   (+) Morbid obesity with BMI of 50 0-59 9, adult [facility])        Physical Exam    Airway    Mallampati score: III  TM Distance: >3 FB  Neck ROM: full     Dental       Cardiovascular  Rhythm: regular, Rate: normal,     Pulmonary  Breath sounds clear to auscultation,     Other Findings        Anesthesia Plan  ASA Score- 2     Anesthesia Type- IV sedation with anesthesia with ASA Monitors  Additional Monitors:   Airway Plan:           Plan Factors-    Chart reviewed  Patient is not a current smoker  Induction- intravenous  Postoperative Plan-     Informed Consent- Anesthetic plan and risks discussed with patient  I personally reviewed this patient with the CRNA  Discussed and agreed on the Anesthesia Plan with the CRNA  Adrienne Plymouth

## 2022-03-21 ENCOUNTER — OFFICE VISIT (OUTPATIENT)
Dept: BARIATRICS | Facility: CLINIC | Age: 27
End: 2022-03-21

## 2022-03-21 VITALS — BODY MASS INDEX: 50.48 KG/M2 | HEIGHT: 61 IN | WEIGHT: 267.4 LBS

## 2022-03-21 DIAGNOSIS — E66.01 MORBID (SEVERE) OBESITY DUE TO EXCESS CALORIES (HCC): ICD-10-CM

## 2022-03-21 DIAGNOSIS — Z01.818 PRE-OP TESTING: ICD-10-CM

## 2022-03-21 PROCEDURE — RECHECK

## 2022-03-21 NOTE — PROGRESS NOTES
Bariatric Nutrition Follow-up Note    Type of surgery    Preop (6 months of weight checks)--4 of 6 today  Surgery Date: TBD  Surgeon: Dr Francisco Naidu  31 y o   female     Wt with BMI of 25: 132 4#  Pre-Op Excess Wt: 132 8#  Height 5' 1" (1 549 m), weight 121 kg (267 lb 6 4 oz)  Body mass index is 50 52 kg/m²  Weight History   Onset of Obesity: Childhood, always over weight, but really gained a lot of weight with each pregnancies  Family history of obesity: Yes  Wt Loss Attempts: Commercial Programs ([employer], [employer], etc )  Exercise  Fasting  High Protein/Low CHO diets (Atkins, [place], etc )  Self Created Diets (Portion Control, Healthy Food Choices, etc )  Keto  "Anorexia" 10-12 years ago when in [employer]  Never officially dx, but wasn't eating much except for crackers over the course of about 1 year  Maximum Wt Lost: 60lbs    Review of History and Medications   Past Medical History:   Diagnosis Date    Anxiety     Morbid obesity with BMI of 50 0-59 9, adult ([facility] [address] )     Urinary tract infection      Past Surgical History:   Procedure Laterality Date     SECTION   and      Social History     Socioeconomic History    Marital status: Single     Spouse name: Not on file    Number of children: Not on file    Years of education: Not on file    Highest education level: Not on file   Occupational History    Not on file   Tobacco Use    Smoking status: Former Smoker     Packs/day: 0 25     Years: 5 00     Pack years: 1 25     Types: Cigarettes     Quit date: 6/10/2016     Years since quittin 7    Smokeless tobacco: Never Used    Tobacco comment: I smoked on and off not 5 years straight   Vaping Use    Vaping Use: Never used   Substance and Sexual Activity    Alcohol use:  Yes     Alcohol/week: 0 0 standard drinks     Comment: I will have a drink 1-2 times a year    Drug use: Not Currently     Types: Marijuana     Comment: I smoked a little in highschool    Sexual activity: Yes     Partners: Female   Other Topics Concern    Not on file   Social History Narrative    Lives with 2 daughters, best friend, best friend's       Social Determinants of Health     Financial Resource Strain: Not on file   Food Insecurity: Not on file   Transportation Needs: Not on file   Physical Activity: Not on file   Stress: Not on file   Social Connections: Not on file   Intimate Partner Violence: Not on file   Housing Stability: Not on file       Current Outpatient Medications:     cholecalciferol (VITAMIN D3) 1,000 units tablet, Take 5 tablets (5,000 Units total) by mouth daily, Disp: 150 tablet, Rfl: 0    hydrocortisone 1 % lotion, Apply topically 2 (two) times a day, Disp: 118 mL, Rfl: 0    multivitamin (THERAGRAN) TABS, Take 1 tablet by mouth daily, Disp: , Rfl:   No current facility-administered medications for this visit  Facility-Administered Medications Ordered in Other Visits:     lactated ringers infusion, 75 mL/hr, Intravenous, Continuous, Salty Reed MD, Last Rate: 75 mL/hr at 03/18/22 0906, 75 mL/hr at 03/18/22 0906     Food Intake and Lifestyle Assessment   Food Intake Assessment completed via usual diet recall  Wake: at 6:45, gets 2 kids ready--5year old and 11year old  Works 9am-2pm  Breakfast: 8am-most mornings will do the Premier shake or Equate  Some days will do eggs  In the past month only went to DD once  Snack: varies  Lunch: 2pm:  Today was hummus and pretzel pack--logged it and realized it was more calories than expected for the small pack (350cals)  If goes out will go to [employer] or OR [place] OR just a snack--encouraged to be more mindful of the calorie content in choices from chain restaurant  Snack: On work days may snack before, at lunch or on way home--grazes  Is still hungry since having just the PBJ so does want a snack when gets home  Dinner: Roommate usually cooks--Last night was pasta    Most often chicken/steak (usually breaded or with sauce) w/rice/potato/pasta and sometimes veggie  Snack: same as above--feels has been snacking less  Beverage intake: water  Protein supplement: not at this time  Estimated protein intake per day: 60gm  Estimated fluid intake per day: on work days much less fluids (32oz), on non-work days will get more  Doing much better with water intake, most days 60oz, some days less  Meals eaten away from home: about 5 meals per week--most often for lunch at work (soup or sandwich--avoiding the fast food)  Typical meal pattern: 2 meals per day and grazes on snacks per day  Eating Behaviors: Consumption of high calorie/ high fat foods, Large portion sizes, Frequent snacking/ grazing, Mindless eating, Emotional eating, Craves sweet foods and Craves salty foods    Food allergies or intolerances: Allergies   Allergen Reactions    Other Hives     SOME shellfish      Cultural or Mu-ism considerations: none    Physical Assessment  Physical Activity  Types of exercise: going for walks more often--3 days per week for about 20mins   Current physical limitations: none    Psychosocial Assessment   Support systems: friend(s) relative(s)  Socioeconomic factors: none  Lives with 2 daughters and lives with 2 friends  Typically mostly one of her friends does the cooking and food shopping  Nutrition Diagnosis  Diagnosis: Overweight / Obesity (NC-3 3)  Related to: Physical inactivity and Excessive energy intake  As Evidenced by: BMI >25     Nutrition Prescription: Recommend the following diet  Regular    Interventions and Teaching   Discussed pre-op and post-op nutrition guidelines  Patient educated and handouts provided    Surgical changes to stomach / GI  Capacity of post-surgery stomach  Diet progression  Adequate hydration  Sugar and fat restriction to decrease "dumping syndrome"  Fat restriction to decrease steatorrhea  Expected weight loss  Weight loss plateaus/ possibility of weight regain  Exercise  Suggestions for pre-op diet  Nutrition considerations after surgery  Protein supplements  Meal planning and preparation  Appropriate carbohydrate, protein, and fat intake, and food/fluid choices to maximize safe weight loss, nutrient intake, and tolerance   Dietary and lifestyle changes  Possible problems with poor eating habits  Intuitive eating  Techniques for self monitoring and keeping daily food journal  Potential for food intolerance after surgery, and ways to deal with them including: lactose intolerance, nausea, reflux, vomiting, diarrhea, food intolerance, appetite changes, gas  Vitamin / Mineral supplementation of Multivitamin with minerals and Vitamin D  Post-operative pregnancy guidelines    Education provided to: patient    Barriers to learning: No barriers identified  Readiness to change: preparation    Prior research on procedure: books, internet and friends or family    Comprehension: verbalizes understanding     Expected Compliance: good    Recommendations  Pt is an appropriate candidate for surgery  Yes    Evaluation / Monitoring  Dietitian to Monitor: Eating pattern as discussed Tolerance of nutrition prescription Body weight Lab values Physical activity    Pre-op wt loss:  Do not gain  Lose about 5% by DOS w/lifestyle change and pre-op diet:  -13 25# (252#)    Workflow:   Psych and/or D+A Clearance: n/a   Bloodwork:  provided rx in March   PCP Letter: completed   Support Group: completed   Nicotine test: n/a   6 Month Pre-Operative Program: 4 of 6 today   EGD: completed 3/18/22   Cardiac Risk Assessment: scheduled on 3/28   Sleep Studies: consult was on March 16th w/[facility] in [place], [place]   Getting machine to do home sleep study soon    Pt came in today frustrated with some weight gain  She is just below her start weight, but feels she has been making changes despite gain  Reviewed diet recall  Pt is logging some and still goes out for lunch often  Suggested to be very mindful of her calories when out  If she is aiming for 1500 cals per day that would be about 200 cals for breakfast, 500 cals for lunch and dinner and 2 150 cals snacks  If she is unknowingly choosing higher calorie foods she will likely go over in her meals  Pt interested in increasing her veggie intake and decrease her carb intake  Encouraged 1/2 plate of veggies       Goals  Food journal via baritastic  Exercise 30 minutes 5 times per week--can do 15 mins 2x/day  Complete lesson plans 1-6  Eat 3 meals per day with lean protein at each meal  Continue protein shake for breakfast  5715-8195 cals per day for 1-2# wt loss per week (avg 1500cals, 94gm protein)  1/2 plate veggies  Limit to planned snacks  64oz water per day  Next 2 months of weight checks scheduled    Time Spent:   30 mins

## 2022-03-28 ENCOUNTER — CONSULT (OUTPATIENT)
Dept: CARDIOLOGY CLINIC | Facility: CLINIC | Age: 27
End: 2022-03-28
Payer: COMMERCIAL

## 2022-03-28 VITALS
DIASTOLIC BLOOD PRESSURE: 70 MMHG | WEIGHT: 273 LBS | TEMPERATURE: 98.7 F | HEIGHT: 61 IN | SYSTOLIC BLOOD PRESSURE: 124 MMHG | HEART RATE: 80 BPM | OXYGEN SATURATION: 98 % | BODY MASS INDEX: 51.54 KG/M2

## 2022-03-28 DIAGNOSIS — Z98.84 BARIATRIC SURGERY STATUS: Primary | ICD-10-CM

## 2022-03-28 PROCEDURE — 1036F TOBACCO NON-USER: CPT | Performed by: INTERNAL MEDICINE

## 2022-03-28 PROCEDURE — 99203 OFFICE O/P NEW LOW 30 MIN: CPT | Performed by: INTERNAL MEDICINE

## 2022-03-28 PROCEDURE — 93000 ELECTROCARDIOGRAM COMPLETE: CPT | Performed by: INTERNAL MEDICINE

## 2022-03-28 PROCEDURE — 3008F BODY MASS INDEX DOCD: CPT | Performed by: INTERNAL MEDICINE

## 2022-03-28 NOTE — PROGRESS NOTES
Consultation - Cardiology Office  [facility]  Hobert Kehr 31 y o  female MRN: [id number]  : 1995  Unit/Bed#:  Encounter: [id number]      ASSESSMENT:  Perioperative cardiac risk assessment for bariatric surgery    Patient denies any cardiac symptoms like chest pain unusual dyspnea, palpitations syncope  She has no history of MI, CHF or cardiac arrhythmias  She is hemodynamically stable and a cardiac examination is benign  Her EKG is normal  She is able to walk more than 4 blocks on level ground without any cardiac symptoms      Morbid obesity:  BMI is 51 58        RECOMMENDATIONS:  Patient has low/acceptable perioperative cardiac risk for bariatric surgery and may proceed without any further cardiac workup or intervention            Thank you for your consultation  If you have any question please call me at [phone]      Primary Care Physician Requesting Consult: Kay Fenton DO      Reason for Consult / Principal Problem:  Preop        HPI :     Hobert Kehr is a 31y o  year old female who was referred by primary care doctor for perioperative cardiac risk assessment prior to undergoing bariatric surgery  Patient has morbid obesity and a BMI of 51 58  She has no other significant medical history and no cardiac symptoms or cardiac history  Cardiac examination is benign and she is hemodynamically stable      Review of Systems  REVIEW OF SYSTEMS:      Constitutional: Negative for activity change, appetite change, chills, fatigue, fever and unexpected weight change  HENT: Negative for congestion, sore throat and trouble swallowing  Eyes: Negative for discharge and redness  Respiratory: Negative for apnea, cough, chest tightness, shortness of breath and wheezing      Cardiovascular: Negative for chest pain, shortness of breath, palpitations and leg swelling  Gastrointestinal: Negative for abdominal distention, abdominal pain, anal bleeding, blood in stool, constipation, diarrhea, nausea and vomiting  Endocrine: Negative for polydipsia, polyphagia and polyuria  Genitourinary: Negative for difficulty urinating, dysuria, flank pain and hematuria  Musculoskeletal: Negative for arthralgias, myalgias and neck stiffness  Skin: Negative for pallor and rash  Allergic/Immunologic: Negative for environmental allergies  Neurological: Negative for dizziness, syncope, light-headedness, numbness and headaches  Hematological: Negative for adenopathy  Does not bruise/bleed easily  Psychiatric/Behavioral: Negative for confusion and hallucinations  The patient is not nervous/anxious        Historical Information   Past Medical History:   Diagnosis Date    Anxiety     Morbid obesity with BMI of 50 0-59 9, adult ([facility] [address] )     Urinary tract infection      Past Surgical History:   Procedure Laterality Date     SECTION   and      Social History     Substance and Sexual Activity   Alcohol Use Yes    Alcohol/week: 0 0 standard drinks    Comment: I will have a drink 1-2 times a year     Social History     Substance and Sexual Activity   Drug Use Not Currently    Types: Marijuana    Comment: I smoked a little in highschool     Social History     Tobacco Use   Smoking Status Former Smoker    Packs/day: 0 25    Years: 5 00    Pack years: 1 25    Types: Cigarettes    Quit date: 6/10/2016    Years since quittin 8   Smokeless Tobacco Never Used   Tobacco Comment    I smoked on and off not 5 years straight     Family History:   Family History   Problem Relation Age of Onset    Lung cancer Mother     Obesity Mother     Cancer Mother         Passed away in  of lung cancer    Thyroid disease Father     Obesity Father     Obesity Sister     Diabetes Neg Hx     Stroke Neg Hx     Heart disease Neg Hx        Meds/Allergies     Allergies   Allergen Reactions    Other Hives     SOME shellfish        Current Outpatient Medications:    cholecalciferol (VITAMIN D3) 1,000 units tablet, Take 5 tablets (5,000 Units total) by mouth daily, Disp: 150 tablet, Rfl: 0    hydrocortisone 1 % lotion, Apply topically 2 (two) times a day, Disp: 118 mL, Rfl: 0    multivitamin (THERAGRAN) TABS, Take 1 tablet by mouth daily, Disp: , Rfl:     Vitals:   /70 mmHg  HR 80/Min  BP Readings from Last 3 Encounters:   03/18/22 95/50   02/18/22 122/78   12/29/21 120/70       Physical Exam  PHYSICAL EXAMINATION:  Neurologic:  Alert & oriented x 3, no new focal deficits, Not in any acute distress,  Constitutional:  Morbidly obese  Eyes:  Pupil equal and reacting to light, conjunctiva normal, No JVP, No LNP   HENT:  Atraumatic, oropharynx moist, Neck- normal range of motion, no tenderness,  Neck supple   Respiratory:  Bilateral air entry, mostly clear to auscultation  Cardiovascular: S1-S2 regular with a I/VI systolic murmur   GI:  Soft, nondistended, normal bowel sounds, nontender, no hepatosplenomegaly appreciated  Musculoskeletal: no tenderness, no deformities  Skin:  Well hydrated, no rash   Lymphatic:  No lymphadenopathy noted   Extremities:  No edema and distal pulses are present    Diagnostic Studies Review Cardio:      EKG:  Normal sinus rhythm, heart rate 80 per minute    Cardiac testing:   No results found for this or any previous visit  Imaging:  Chest X-Ray:   No Chest XR results available for this patient  CT-scan of the chest:     No CTA results available for this patient    Lab Review   Lab Results   Component Value Date    WBC 4 7 09/23/2021    HGB 13 5 09/23/2021    HCT 40 3 09/23/2021    MCV 82 09/23/2021    RDW 13 2 09/23/2021     09/23/2021     BMP:  Lab Results   Component Value Date    SODIUM 138 09/23/2021    K 4 8 09/23/2021     09/23/2021    CO2 23 09/23/2021    BUN 9 09/23/2021    CREATININE 0 84 09/23/2021    GLUC 84 09/23/2021     LFT:  Lab Results   Component Value Date    AST 21 09/23/2021    ALT 21 09/23/2021    TP 7 1 09/23/2021    ALB 4 5 09/23/2021      No results found for: [id number]  No components found for: JENNIFER [facility]  Lab Results   Component Value Date    HGBA1C 5 3 09/23/2021     Lipid Profile:   Lab Results   Component Value Date    CHOLESTEROL 169 09/23/2021    HDL 43 09/23/2021    LDLCALC 110 (H) 09/23/2021    TRIG 84 09/23/2021     Lab Results   Component Value Date    CHOLESTEROL 169 09/23/2021     No results found for: CKTOTAL, CKMB, CKMBINDEX, TROPONINI  No results found for: NTBNP   Recent Results (from the past 672 hour(s))   Urine Pregnancy (Holding Area Only) POCT    Collection Time: 03/18/22  9:04 AM   Result Value Ref Range    EXT Preg Test, Ur Negative Negative    Control Valid Valid   Tissue Exam    Collection Time: 03/18/22 10:01 AM   Result Value Ref Range    Case Report       Surgical Pathology Report                         Case: [id number]                                   Authorizing Provider:  Jeremiah Flannery MD      Collected:           03/18/2022 1001              Ordering Location:     [address] [place] [address] Received:            03/18/2022 1104                                     Operating Room                                                               Pathologist:           Braeden Suh MD                                                       Specimen:    Stomach, antral bxs- r/o H-Pylori                                                          Final Diagnosis       A  Stomach, antral bxs- r/o H-Pylori Biopsy:   Benign gastric-oxyntoantral type mucosa with mild chronic inflammation and reactive surface foveolar epithelial changes consistent with reactive/ chemical gastritis   -No evidence of ulceration   -No evidence of dysplasia or malignancy   -No H Pylori organisms identified on H&E stained slide        Note       Interpretation performed at [address] [place] [address], [facility], [address] [place] [address] [address] [id number]        Additional Information       All reported additional testing was performed with appropriately reactive controls  These tests were developed and their performance characteristics determined by [facility] or appropriate performing facility, though some tests may be performed on tissues which have not been validated for performance characteristics (such as staining performed on alcohol exposed cell blocks and decalcified tissues)  Results should be interpreted with caution and in the context of the patients clinical condition  These tests may not be cleared or approved by the [place]  Food and Drug Administration, though the FDA has determined that such clearance or approval is not necessary  These tests are used for clinical purposes and they should not be regarded as investigational or for research  This laboratory has been approved by [facility], designated as a high-complexity laboratory and is qualified to perform these tests  Jong Lara Description          A  The specimen is received in formalin, labeled with the patient's name and hospital number, and is designated "antral biopsies rule out H pylori  The specimen consists of 3 tan-red, focally friable soft tissue fragments ranging from 0 3-0 6 cm in greatest dimension  Entirely submitted  Screened cassette  Note: The estimated total formalin fixation time based upon information provided by the submitting clinician and the standard processing schedule is under 72 hours  Tyshawn Mcgill MD, [facility]      "This note has been constructed using a voice recognition system  Therefore there may be syntax, spelling, and/or grammatical errors   Please call if you have any questions  "

## 2022-04-22 ENCOUNTER — OFFICE VISIT (OUTPATIENT)
Dept: BARIATRICS | Facility: CLINIC | Age: 27
End: 2022-04-22

## 2022-04-22 VITALS — WEIGHT: 264.2 LBS | BODY MASS INDEX: 49.92 KG/M2

## 2022-04-22 DIAGNOSIS — E66.01 MORBID (SEVERE) OBESITY DUE TO EXCESS CALORIES (HCC): Primary | ICD-10-CM

## 2022-04-22 PROCEDURE — RECHECK

## 2022-04-22 NOTE — PROGRESS NOTES
Patient presents for weight check 5 of 6, current weight 264 2lbs  Eating behaviors/food choices: Patient reports more focused effort to bring food with her to work and not depend on eating out  She does have some limitations in time to eat her lunch but agrees to bring cold lunch with her rather than needing to heat something up  Patient also planning to make her own dinners rather than relying on her roommate who tends to make higher calorie foods  She wants to look up recipes for lower calorie protein options, encouraged to track her meals for accountability  Activity/Exercise:  Patient hasn't had much time to be active since she is busy with work and taking care of her daughter  Encouraged to pair up activities such as going for walks when her daughter is at her activities  Mental Health/Wellness:  Patient reports improved mood, feeling more hopeful now that she has lost some of the weight  She feels more secure in what she needs to do and is going to continue healthy eating practices      Workflow review:    Labs and PCP: needs labs, PCP done  Psych and EGD: no eval needed, EGD done 3/18/2022  Nicotine: NA  Support Group: done  Cardiology: done 3/28  Sleep: sleep study done recently and waiting for results  Weight and Weight Checks: six weight checks, goal weight is 252lbs    Goals:    - continue healthy food choices, bringing protein items with her to work and cooking her own dinners  - increase activity, maximizing free time to get in a walk    Next Appointment:  Weight check 6 of 6 with SW on 5/20

## 2022-04-24 LAB
ALBUMIN SERPL-MCNC: 4 G/DL (ref 3.9–5)
ALBUMIN/GLOB SERPL: 2.1 {RATIO} (ref 1.2–2.2)
ALP SERPL-CCNC: 51 IU/L (ref 44–121)
ALT SERPL-CCNC: 22 IU/L (ref 0–32)
AST SERPL-CCNC: 21 IU/L (ref 0–40)
BILIRUB SERPL-MCNC: 0.4 MG/DL (ref 0–1.2)
BUN SERPL-MCNC: 9 MG/DL (ref 6–20)
BUN/CREAT SERPL: 11 (ref 9–23)
CALCIUM SERPL-MCNC: 8.9 MG/DL (ref 8.7–10.2)
CHLORIDE SERPL-SCNC: 103 MMOL/L (ref 96–106)
CHOLEST SERPL-MCNC: 156 MG/DL (ref 100–199)
CHOLEST/HDLC SERPL: 3.7 RATIO (ref 0–4.4)
CO2 SERPL-SCNC: 22 MMOL/L (ref 20–29)
CREAT SERPL-MCNC: 0.81 MG/DL (ref 0.57–1)
EGFR: 101 ML/MIN/1.73
ERYTHROCYTE [DISTWIDTH] IN BLOOD BY AUTOMATED COUNT: 13.2 % (ref 11.7–15.4)
EST. AVERAGE GLUCOSE BLD GHB EST-MCNC: 108 MG/DL
GLOBULIN SER-MCNC: 1.9 G/DL (ref 1.5–4.5)
GLUCOSE SERPL-MCNC: 77 MG/DL (ref 65–99)
HBA1C MFR BLD: 5.4 % (ref 4.8–5.6)
HCT VFR BLD AUTO: 42.8 % (ref 34–46.6)
HDLC SERPL-MCNC: 42 MG/DL
HGB BLD-MCNC: 13.9 G/DL (ref 11.1–15.9)
LDLC SERPL CALC-MCNC: 100 MG/DL (ref 0–99)
MCH RBC QN AUTO: 28.1 PG (ref 26.6–33)
MCHC RBC AUTO-ENTMCNC: 32.5 G/DL (ref 31.5–35.7)
MCV RBC AUTO: 87 FL (ref 79–97)
PLATELET # BLD AUTO: 362 X10E3/UL (ref 150–450)
POTASSIUM SERPL-SCNC: 4.1 MMOL/L (ref 3.5–5.2)
PROT SERPL-MCNC: 5.9 G/DL (ref 6–8.5)
RBC # BLD AUTO: 4.94 X10E6/UL (ref 3.77–5.28)
SL AMB VLDL CHOLESTEROL CALC: 14 MG/DL (ref 5–40)
SODIUM SERPL-SCNC: 139 MMOL/L (ref 134–144)
TRIGL SERPL-MCNC: 73 MG/DL (ref 0–149)
TSH SERPL DL<=0.005 MIU/L-ACNC: 3.1 UIU/ML (ref 0.45–4.5)
WBC # BLD AUTO: 6 X10E3/UL (ref 3.4–10.8)

## 2022-04-25 ENCOUNTER — ANNUAL EXAM (OUTPATIENT)
Dept: FAMILY MEDICINE CLINIC | Facility: CLINIC | Age: 27
End: 2022-04-25
Payer: COMMERCIAL

## 2022-04-25 VITALS
HEART RATE: 86 BPM | SYSTOLIC BLOOD PRESSURE: 116 MMHG | BODY MASS INDEX: 52.61 KG/M2 | RESPIRATION RATE: 20 BRPM | HEIGHT: 60 IN | DIASTOLIC BLOOD PRESSURE: 76 MMHG | TEMPERATURE: 97.4 F | WEIGHT: 268 LBS | OXYGEN SATURATION: 99 %

## 2022-04-25 DIAGNOSIS — Z12.4 SCREENING FOR CERVICAL CANCER: ICD-10-CM

## 2022-04-25 DIAGNOSIS — Z01.419 WOMEN'S ANNUAL ROUTINE GYNECOLOGICAL EXAMINATION: Primary | ICD-10-CM

## 2022-04-25 DIAGNOSIS — Z30.09 ENCOUNTER FOR COUNSELING REGARDING CONTRACEPTION: ICD-10-CM

## 2022-04-25 PROCEDURE — 99395 PREV VISIT EST AGE 18-39: CPT | Performed by: FAMILY MEDICINE

## 2022-04-25 PROCEDURE — 1036F TOBACCO NON-USER: CPT | Performed by: FAMILY MEDICINE

## 2022-04-25 PROCEDURE — 3008F BODY MASS INDEX DOCD: CPT | Performed by: FAMILY MEDICINE

## 2022-04-25 NOTE — PROGRESS NOTES
Hiral Peterson  28 y o   04/25/22      CC:  Annual gyn    Follow up:  Mirena insertion    Assessment and Plan      Problem List Items Addressed This Visit     None      Visit Diagnoses     Women's annual routine gynecological examination    -  Primary    Screening for cervical cancer        Relevant Orders    IGP, rfx Aptima HPV ASCU    Encounter for counseling regarding contraception               Patient is in a monogamous relationship with a female so pregnancy is not a concern  However patient does not want to get her period monthly  Discussed different forms of birth control that may stop her periods altogether  Eventually patient decided to have Mirena inserted  She will be scheduled for insertion next week  Discussed taking ibuprofen or Tylenol 30 minutes before appointment   Patient understands and agrees with the plan  Plan discussed with attending- Dr Lázaro Jacobson:     Pedro Pablo Baptiste     Periods are regular but heavy  Patient monogamous with 1 female partner  No concerns for STDs  Would like to start birth control to stop her periods monthly  Be   Review of Systems   Constitutional: Negative for chills and fever  Respiratory: Negative for cough and shortness of breath  Cardiovascular: Negative for chest pain and palpitations  Genitourinary: Positive for menstrual problem (Heavyperiods)  Negative for difficulty urinating, dyspareunia, dysuria, vaginal bleeding, vaginal discharge and vaginal pain  Neurological: Negative for dizziness and headaches          The following portions of the patient's history were reviewed and updated as appropriate:  current medications, past family history, past medical history, past social history, past surgical history and problem list     Current Outpatient Medications on File Prior to Visit   Medication Sig Dispense Refill    cholecalciferol (VITAMIN D3) 1,000 units tablet Take 5 tablets (5,000 Units total) by mouth daily 150 tablet 0    multivitamin (THERAGRAN) TABS Take 1 tablet by mouth daily      hydrocortisone 1 % lotion Apply topically 2 (two) times a day (Patient not taking: Reported on 3/28/2022 ) 118 mL 0     No current facility-administered medications on file prior to visit  Objective:    /76 (BP Location: Left arm, Patient Position: Sitting, Cuff Size: Standard)   Pulse 86   Temp (!) 97 4 °F (36 3 °C) (Tympanic)   Resp 20   Ht 5' (1 524 m)   Wt 122 kg (268 lb)   LMP 04/12/2022   SpO2 99%   BMI 52 34 kg/m²     Physical Exam  Constitutional:       Appearance: Normal appearance  She is obese  HENT:      Head: Normocephalic  Chest:   Breasts:      Right: Normal  No axillary adenopathy or supraclavicular adenopathy  Left: Normal  No axillary adenopathy or supraclavicular adenopathy  Abdominal:      Hernia: There is no hernia in the left inguinal area or right inguinal area  Genitourinary:     Pubic Area: No rash or pubic lice  Labia:         Right: No rash or tenderness  Left: No tenderness  Vagina: Normal       Cervix: No cervical motion tenderness, friability or erythema  Uterus: Normal              Comments: Uterus hard to palpate due to body habitus   Lymphadenopathy:      Upper Body:      Right upper body: No supraclavicular, axillary or pectoral adenopathy  Left upper body: No supraclavicular, axillary or pectoral adenopathy  Lower Body: No right inguinal adenopathy  No left inguinal adenopathy  Neurological:      Mental Status: She is alert  Psychiatric:         Behavior: Behavior is cooperative  Some portions of this record may have been generated with voice recognition software  There may be translation, syntax, or grammatical errors  Occasional wrong word or "sound-a-like" substitutions may have occurred due to the inherent limitations of the voice recognition software       [facility] PGY3

## 2022-04-27 ENCOUNTER — TELEPHONE (OUTPATIENT)
Dept: BARIATRICS | Facility: CLINIC | Age: 27
End: 2022-04-27

## 2022-04-27 LAB
CYTOLOGIST CVX/VAG CYTO: NORMAL
DX ICD CODE: NORMAL
OTHER STN SPEC: NORMAL
OTHER STN SPEC: NORMAL
PATH REPORT.FINAL DX SPEC: NORMAL
SL AMB NOTE:: NORMAL
SL AMB SPECIMEN ADEQUACY: NORMAL
SL AMB TEST METHODOLOGY: NORMAL

## 2022-04-27 NOTE — TELEPHONE ENCOUNTER
Patient called today to discuss if there would be any complications with her having an IUD placed in the next few weeks while she is in the surgical process  I told patient I did not see an issue with her having this placed and that she should feel free to schedule that appt

## 2022-05-05 ENCOUNTER — PROCEDURE VISIT (OUTPATIENT)
Dept: FAMILY MEDICINE CLINIC | Facility: CLINIC | Age: 27
End: 2022-05-05

## 2022-05-05 VITALS
TEMPERATURE: 97.2 F | RESPIRATION RATE: 20 BRPM | DIASTOLIC BLOOD PRESSURE: 70 MMHG | HEART RATE: 97 BPM | OXYGEN SATURATION: 99 % | SYSTOLIC BLOOD PRESSURE: 122 MMHG

## 2022-05-05 DIAGNOSIS — Z30.430 ENCOUNTER FOR INSERTION OF MIRENA IUD: ICD-10-CM

## 2022-05-05 DIAGNOSIS — R10.9 ABDOMINAL PAIN, UNSPECIFIED ABDOMINAL LOCATION: Primary | ICD-10-CM

## 2022-05-05 DIAGNOSIS — Z30.430 ENCOUNTER FOR INSERTION OF INTRAUTERINE CONTRACEPTIVE DEVICE (IUD): ICD-10-CM

## 2022-05-05 LAB — SL AMB POCT URINE HCG: NEGATIVE

## 2022-05-05 RX ORDER — IBUPROFEN 400 MG/1
400 TABLET ORAL ONCE
Status: COMPLETED | OUTPATIENT
Start: 2022-05-05 | End: 2022-05-05

## 2022-05-05 RX ADMIN — IBUPROFEN 400 MG: 400 TABLET ORAL at 16:05

## 2022-05-05 NOTE — PROGRESS NOTES
CPT CODE      - 2 prior   1 female sexual partner  Would like Mirena inserted to lighten her periods which are currently heavy and painful  Most recent pap 2022 - negative    Pregnancy test in the office was negative  Two attempts were made to insert IUD  First attempt the device did not fully eject into the uterus so was pulled out with the insertion device  During the second attempt, there was resistance met when trying to insert the device  The cervix was dilated using plastic dilator and sounded to 11 cm  Instructed patient to come back in 1 month to discuss other options and to follow up on pain and bleeding  Iud insertions    Date/Time: 2022 4:13 PM  Performed by: Trell Napier DO  Authorized by: Trell Napier DO   Universal Protocol:  Procedure performed by:  Consent: Verbal consent obtained  Written consent obtained  Risks and benefits: risks, benefits and alternatives were discussed  Consent given by: patient  Patient understanding: patient states understanding of the procedure being performed  Patient consent: the patient's understanding of the procedure matches consent given  Procedure consent: procedure consent matches procedure scheduled  Patient identity confirmed: verbally with patient        Procedure:     Pelvic exam performed: yes      Negative GC/chlamydia test: no      Negative urine pregnancy test: yes      Negative serum pregnancy test: no      Cervix cleaned and prepped: yes      Speculum placed in vagina: yes      Tenaculum applied to cervix: yes      Uterus sounded: yes      Uterus sound depth (cm):  11    IUD inserted with no complications: no (see above comments   After 2 attempts, procedure was stopped  )      IUD type:  Mirena

## 2022-05-20 ENCOUNTER — OFFICE VISIT (OUTPATIENT)
Dept: BARIATRICS | Facility: CLINIC | Age: 27
End: 2022-05-20

## 2022-05-20 VITALS — BODY MASS INDEX: 47.46 KG/M2 | WEIGHT: 251.4 LBS | HEIGHT: 61 IN

## 2022-05-20 DIAGNOSIS — E66.01 MORBID (SEVERE) OBESITY DUE TO EXCESS CALORIES (HCC): Primary | ICD-10-CM

## 2022-05-20 PROCEDURE — 3008F BODY MASS INDEX DOCD: CPT | Performed by: FAMILY MEDICINE

## 2022-05-20 PROCEDURE — RECHECK

## 2022-05-20 NOTE — PROGRESS NOTES
Bariatric Nutrition Follow-up Note    Type of surgery    Preop (6 months of weight checks)--6 of 6 today  Surgery Date: TBD--RYGB  Surgeon: Dr Ayana Toussaint  28 y o   female     Wt with BMI of 25: 132 4#  Pre-Op Excess Wt: 132 8#  Height 5' 1" (1 549 m), weight 114 kg (251 lb 6 4 oz)  Body mass index is 47 5 kg/m²  Weight History   Onset of Obesity: Childhood, always over weight, but really gained a lot of weight with each pregnancies  Family history of obesity: Yes  Wt Loss Attempts: Commercial Programs ([employer], [employer], etc )  Exercise  Fasting  High Protein/Low CHO diets (Atkins, Union, etc )  Self Created Diets (Portion Control, Healthy Food Choices, etc )  Keto  "Anorexia" 10-12 years ago when in [employer]  Never officially dx, but wasn't eating much except for crackers over the course of about 1 year  Maximum Wt Lost: 60lbs    Review of History and Medications   Past Medical History:   Diagnosis Date    Anxiety     Morbid obesity with BMI of 50 0-59 9, adult ([address] )     Urinary tract infection      Past Surgical History:   Procedure Laterality Date     SECTION   and 2016     Social History     Socioeconomic History    Marital status: Single     Spouse name: Not on file    Number of children: Not on file    Years of education: Not on file    Highest education level: Not on file   Occupational History    Not on file   Tobacco Use    Smoking status: Former Smoker     Packs/day: 0 25     Years: 5 00     Pack years: 1 25     Types: Cigarettes     Quit date: 6/10/2016     Years since quittin 9    Smokeless tobacco: Never Used    Tobacco comment: I smoked on and off not 5 years straight   Vaping Use    Vaping Use: Never used   Substance and Sexual Activity    Alcohol use:  Yes     Alcohol/week: 0 0 standard drinks     Comment: I will have a drink 1-2 times a year    Drug use: Not Currently     Types: Marijuana     Comment: I smoked a little in highschool    Sexual activity: Yes     Partners: Female   Other Topics Concern    Not on file   Social History Narrative    Lives with 2 daughters, best friend, best friend's       Social Determinants of Health     Financial Resource Strain: Not on file   Food Insecurity: Not on file   Transportation Needs: Not on file   Physical Activity: Not on file   Stress: Not on file   Social Connections: Not on file   Intimate Partner Violence: Not on file   Housing Stability: Not on file       Current Outpatient Medications:     cholecalciferol (VITAMIN D3) 1,000 units tablet, Take 5 tablets (5,000 Units total) by mouth daily, Disp: 150 tablet, Rfl: 0    multivitamin (THERAGRAN) TABS, Take 1 tablet by mouth daily, Disp: , Rfl:      Food Intake and Lifestyle Assessment   Food Intake Assessment completed via usual diet recall/food log--1000-1200cals most day  Wake: at 6:45, gets 2 kids ready--5year old and 11year old  Works 9am-2pm  Breakfast: 8am-most mornings will do the [employer] shake or [employer]: fruit or mixed nuts or greek yogurt or protein bars or veggies and hummus/ranch--2 snacks per day  Lunch: 2pm:  Carb smart wrap with turkey and cheese  Snack:  Something from what is listed above  Dinner: protein and veggies, very little carb  Snack:--  Now [employer] on only Tuesdays because of work schedule, no other eating out--usually 1/2 sandwich and soup     Beverage intake: water  Protein supplement: not at this time  Estimated protein intake per day: 60gm  Estimated fluid intake per day: on work days much less fluids (32oz), on non-work days will get more  Doing much better with water intake, most days 60oz, some days less  Meals eaten away from home: about 5 meals per week--now down to once a week!   Typical meal pattern: 2 meals per day and grazes on snacks per day  Eating Behaviors: Consumption of high calorie/ high fat foods, Large portion sizes, Frequent snacking/ grazing, Mindless eating, Emotional eating, Craves sweet foods and Craves salty foods    Food allergies or intolerances: Allergies   Allergen Reactions    Other Hives     SOME shellfish      Cultural or Christianity considerations: none    Physical Assessment  Physical Activity  Types of exercise: going for walks more often--3 days per week for about 20mins   Current physical limitations: none    Psychosocial Assessment   Support systems: friend(s) relative(s)  Socioeconomic factors: none  Lives with 2 daughters and lives with 2 friends  Typically mostly one of her friends does the cooking and food shopping  Nutrition Diagnosis  Diagnosis: Overweight / Obesity (NC-3 3)  Related to: Physical inactivity and Excessive energy intake  As Evidenced by: BMI >25     Nutrition Prescription: Recommend the following diet  Regular    Interventions and Teaching   Discussed pre-op and post-op nutrition guidelines  Patient educated and handouts provided    Surgical changes to stomach / GI  Capacity of post-surgery stomach  Diet progression  Adequate hydration  Sugar and fat restriction to decrease "dumping syndrome"  Fat restriction to decrease steatorrhea  Expected weight loss  Weight loss plateaus/ possibility of weight regain  Exercise  Suggestions for pre-op diet  Nutrition considerations after surgery  Protein supplements  Meal planning and preparation  Appropriate carbohydrate, protein, and fat intake, and food/fluid choices to maximize safe weight loss, nutrient intake, and tolerance   Dietary and lifestyle changes  Possible problems with poor eating habits  Intuitive eating  Techniques for self monitoring and keeping daily food journal  Potential for food intolerance after surgery, and ways to deal with them including: lactose intolerance, nausea, reflux, vomiting, diarrhea, food intolerance, appetite changes, gas  Vitamin / Mineral supplementation of Multivitamin with minerals and Vitamin D  Post-operative pregnancy guidelines    Education provided to: patient    Barriers to learning: No barriers identified  Readiness to change: preparation    Prior research on procedure: books, internet and friends or family    Comprehension: verbalizes understanding     Expected Compliance: good    Recommendations  Pt is an appropriate candidate for surgery  Yes    Evaluation / Monitoring  Dietitian to Monitor: Eating pattern as discussed Tolerance of nutrition prescription Body weight Lab values Physical activity    Pre-op wt loss:  Do not gain  Lose about 5% by DOS w/lifestyle change and pre-op diet:  -13 25# (252#)    Workflow:   Psych and/or D+A Clearance: n/a   Bloodwork:  Completed on 4/23/22   PCP Letter: completed   Support Group: completed   Nicotine test: n/a   6 Month Pre-Operative Program: 6 of 6 today   EGD: completed 3/18/22   Cardiac Risk Assessment: completed on 3/28   Sleep Studies: found with mild SOM and told no CPAP, recommends at "special pillow" and oral appliance    Reviewed post-op vitamins and provided samples  Reviewed pre-op diet    Pt presents today for final weight check prior to being submitted for surgery  Pt has lost 12 8lbs in the past month with a total of 16 5lbs total from starting the program  She said this month things just started to fall in place and is excited about the weight loss  She is having 3 meals a day with protein, sensible snacks and is limiting eating out to once a week  As of today she has completed her workflow  Info from sleep study was emailed over to   Today we reviewed post-op vitamins, provided samples and reviewed the 2 week pre-op liver shrinkig diet  Pt verbalizes understanding and w/o further questions at this time      Goals  Continue food journal via baritastic  Exercise 30 minutes 5 times per week--can do 15 mins 2x/day  Read through chapter 3 in manual  Eat 3 meals per day with lean protein at each meal  Continue protein shake for breakfast  2658-3346 cals per day for 1-2# wt loss per week (avg 1500cals, 94gm protein)  1/2 plate veggies  Limit to planned snacks  64oz water per day  [employer] coordinator pt ready to be submitted    Time Spent:   30 mins

## 2022-06-07 ENCOUNTER — TELEPHONE (OUTPATIENT)
Dept: BARIATRICS | Facility: CLINIC | Age: 27
End: 2022-06-07

## 2022-06-08 ENCOUNTER — PREP FOR PROCEDURE (OUTPATIENT)
Dept: BARIATRICS | Facility: CLINIC | Age: 27
End: 2022-06-08

## 2022-06-08 DIAGNOSIS — F41.9 ANXIETY: ICD-10-CM

## 2022-06-08 DIAGNOSIS — E66.01 MORBID OBESITY (HCC): Primary | ICD-10-CM

## 2022-06-24 ENCOUNTER — CLINICAL SUPPORT (OUTPATIENT)
Dept: BARIATRICS | Facility: CLINIC | Age: 27
End: 2022-06-24

## 2022-06-24 DIAGNOSIS — E66.01 MORBID (SEVERE) OBESITY DUE TO EXCESS CALORIES (HCC): Primary | ICD-10-CM

## 2022-06-24 PROCEDURE — RECHECK

## 2022-06-27 NOTE — PROGRESS NOTES
H&P Exam - Bariatric Surgery   Lorena Nevarez 28 y o  female MRN: [id number]  Unit/Bed#:  Encounter: [id number]      HPI:    28 y o  yo morbidly obese female found to be a good candidate to undergo a weight loss operation upon being enrolled here at the [facility]   She has been pre certified to undergo a Laparoscopic Fortunato-en-Y Gastric Bypass  Here today to review her pre op test results  Has been medically cleared for the procedure  I have discussed with her at length the risks and benefits of the operation and reiterated the components of our multidisciplinary program and the importance of compliance and follow up in the post operative period  Although there is a great statistical chance of improvement or even resolution of most of her associated comorbidities, the results vary from patient to patient and they largely depend on his commitment  The patient was also instructed with regards to the importance of behavior modification, nutritional counseling, support meeting attendance and lifestyle changes that are important to ensure success  She was given the opportunity to ask questions and I have answered all of them  I have addressed with the patient the level of CODE STATUS for this hospital stay and after explaining the different options currently she wishes to be a Level I  She understands and wishes to proceed  She has lost all the weight required prior to surgery  Review of Systems   Constitutional: Negative for chills and fever  Unexpected weight change: planned weight loss  HENT: Negative for trouble swallowing  Respiratory: Negative for cough and shortness of breath  Cardiovascular: Negative for chest pain and palpitations  Gastrointestinal: Negative for abdominal pain, constipation, diarrhea, nausea and vomiting  Musculoskeletal: Positive for back pain  Neurological: Negative for dizziness     Psychiatric/Behavioral: The patient is nervous/anxious  Historical Information   Past Medical History:   Diagnosis Date    Anxiety     Morbid obesity with BMI of 50 0-59 9, adult ([facility] [address] )     Urinary tract infection      Past Surgical History:   Procedure Laterality Date     SECTION   and      Social History   Social History     Substance and Sexual Activity   Alcohol Use Yes    Alcohol/week: 0 0 standard drinks    Comment: I will have a drink 1-2 times a year     Social History     Substance and Sexual Activity   Drug Use Not Currently    Types: Marijuana    Comment: I smoked a little in highschool     Social History     Tobacco Use   Smoking Status Former Smoker    Packs/day: 0 25    Years: 5 00    Pack years: 1 25    Types: Cigarettes    Quit date: 6/10/2016    Years since quittin 0   Smokeless Tobacco Never Used   Tobacco Comment    I smoked on and off not 5 years straight     Family History: non-contributory    Meds/Allergies   all medications and allergies reviewed  Allergies   Allergen Reactions    Other Hives     SOME shellfish        Objective     Current Vitals:   Blood Pressure: 110/70 (22 1446)  Pulse: 84 (22 1446)  Height: 5' 1" (154 9 cm) (22 1446)  Weight - Scale: 109 kg (241 lb 3 2 oz) (22 1446)      Invasive Devices  Report    None                 Physical Exam  Vitals reviewed  Constitutional:       General: She is not in acute distress  Appearance: She is well-developed  She is obese  Comments: obese   HENT:      Head: Normocephalic and atraumatic  Eyes:      General: No scleral icterus  Cardiovascular:      Rate and Rhythm: Normal rate and regular rhythm  Heart sounds: Normal heart sounds  Pulmonary:      Effort: No respiratory distress  Breath sounds: Normal breath sounds  Abdominal:      General: Bowel sounds are normal       Palpations: Abdomen is soft  Tenderness: There is no abdominal tenderness     Neurological:      Mental Status: She is alert and oriented to person, place, and time  Psychiatric:         Mood and Affect: Mood normal          Behavior: Behavior normal          Lab Results: I have personally reviewed pertinent lab results  Imaging: I have personally reviewed pertinent reports  EKG, Pathology, and Other Studies: I have personally reviewed pertinent reports  Code Status: Level 1    Assessment:  28 y o  yo morbidly obese female found to be a good candidate to undergo a weight loss operation upon being enrolled here at the [facility]  She has completed all of the preoperative process for bariatric surgery      Plan:  - Plan for laparoscopic possible open Fortunato-en-Y Gastric Bypass possible sleeve with intraoperative EGD  - consent signed  - preop orders placed

## 2022-06-29 ENCOUNTER — OFFICE VISIT (OUTPATIENT)
Dept: BARIATRICS | Facility: CLINIC | Age: 27
End: 2022-06-29
Payer: COMMERCIAL

## 2022-06-29 VITALS
BODY MASS INDEX: 45.54 KG/M2 | DIASTOLIC BLOOD PRESSURE: 70 MMHG | HEART RATE: 84 BPM | SYSTOLIC BLOOD PRESSURE: 110 MMHG | HEIGHT: 61 IN | WEIGHT: 241.2 LBS

## 2022-06-29 DIAGNOSIS — E66.01 MORBID OBESITY WITH BMI OF 50.0-59.9, ADULT (HCC): Primary | ICD-10-CM

## 2022-06-29 DIAGNOSIS — Z98.84 BARIATRIC SURGERY STATUS: ICD-10-CM

## 2022-06-29 PROCEDURE — 99214 OFFICE O/P EST MOD 30 MIN: CPT | Performed by: PHYSICIAN ASSISTANT

## 2022-06-29 PROCEDURE — 1036F TOBACCO NON-USER: CPT | Performed by: PHYSICIAN ASSISTANT

## 2022-06-29 PROCEDURE — 3008F BODY MASS INDEX DOCD: CPT | Performed by: PHYSICIAN ASSISTANT

## 2022-06-29 RX ORDER — ACETAMINOPHEN 325 MG/1
975 TABLET ORAL ONCE
Status: CANCELLED | OUTPATIENT
Start: 2022-07-18 | End: 2022-06-29

## 2022-06-29 RX ORDER — SCOLOPAMINE TRANSDERMAL SYSTEM 1 MG/1
1 PATCH, EXTENDED RELEASE TRANSDERMAL ONCE
Status: CANCELLED | OUTPATIENT
Start: 2022-07-18 | End: 2022-06-29

## 2022-06-29 RX ORDER — CELECOXIB 200 MG/1
200 CAPSULE ORAL ONCE
Status: CANCELLED | OUTPATIENT
Start: 2022-07-18 | End: 2022-06-29

## 2022-06-29 RX ORDER — OXYCODONE HYDROCHLORIDE 5 MG/1
5 TABLET ORAL EVERY 4 HOURS PRN
Qty: 10 TABLET | Refills: 0 | Status: SHIPPED | OUTPATIENT
Start: 2022-06-29

## 2022-06-29 RX ORDER — GABAPENTIN 100 MG/1
300 CAPSULE ORAL ONCE
Status: CANCELLED | OUTPATIENT
Start: 2022-07-18 | End: 2022-06-29

## 2022-06-29 RX ORDER — CEFAZOLIN SODIUM 2 G/50ML
2000 SOLUTION INTRAVENOUS ONCE
Status: CANCELLED | OUTPATIENT
Start: 2022-07-18 | End: 2022-06-29

## 2022-06-29 RX ORDER — PANTOPRAZOLE SODIUM 40 MG/1
40 TABLET, DELAYED RELEASE ORAL DAILY
Qty: 90 TABLET | Refills: 1 | Status: SHIPPED | OUTPATIENT
Start: 2022-06-29

## 2022-06-29 RX ORDER — METRONIDAZOLE 500 MG/100ML
500 INJECTION, SOLUTION INTRAVENOUS ONCE
Status: CANCELLED | OUTPATIENT
Start: 2022-07-18 | End: 2022-06-29

## 2022-06-29 RX ORDER — HEPARIN SODIUM 5000 [USP'U]/ML
5000 INJECTION, SOLUTION INTRAVENOUS; SUBCUTANEOUS
Status: CANCELLED | OUTPATIENT
Start: 2022-07-18 | End: 2022-07-19

## 2022-07-12 NOTE — PRE-PROCEDURE INSTRUCTIONS
My Surgical Experience    The following information was developed to assist you to prepare for your operation  What do I need to do before coming to the hospital?   Arrange for a responsible person to drive you to and from the hospital    Arrange care for your children at home  Children are not allowed in the recovery areas of the hospital   Plan to wear clothing that is easy to put on and take off  If you are having shoulder surgery, wear a shirt that buttons or zippers in the front  Bathing  o Shower the evening before and the morning of your surgery with an antibacterial soap  Please refer to the Pre Op Showering Instructions for Surgery Patients Sheet   o Remove nail polish and all body piercing jewelry  o Do not shave any body part for at least 24 hours before surgery-this includes face, arms, legs and upper body  Food  o Nothing to eat or drink after midnight the night before your surgery  This includes candy and chewing gum  o Exception: If your surgery is after 12:00pm (noon), you may have clear liquids such as 7-Up®, ginger ale, apple or cranberry juice, Jell-O®, water, or clear broth until 8:00 am  o Do not drink milk or juice with pulp on the morning before surgery  o Do not drink alcohol 24 hours before surgery  Medicine  o Follow instructions you received from your surgeon about which medicines you may take on the day of surgery  o If instructed to take medicine on the morning of surgery, take pills with just a small sip of water  Call your prescribing doctor for specific infroamtion on what to do if you take insulin    What should I bring to the hospital?    Bring:  Bonny Murray or a walker, if you have them, for foot or knee surgery   A list of the daily medicines, vitamins, minerals, herbals and nutritional supplements you take   Include the dosages of medicines and the time you take them each day   Glasses, dentures or hearing aids   Minimal clothing; you will be wearing hospital sleepwear   Photo ID; required to verify your identity   If you have a Living Will or Power of , bring a copy of the documents   If you have an ostomy, bring an extra pouch and any supplies you use    Do not bring   Medicines or inhalers   Money, valuables or jewelry    What other information should I know about the day of surgery?  Notify your surgeons if you develop a cold, sore throat, cough, fever, rash or any other illness   Report to the Ambulatory Surgical/Same Day Surgery Unit   You will be instructed to stop at Registration only if you have not been pre-registered   Inform your  fi they do not stay that they will be asked by the staff to leave a phone number where they can be reached   Be available to be reached before surgery  In the event the operating room schedule changes, you may be asked to come in earlier or later than expected    *It is important to tell your doctor and others involved in your health care if you are taking or have been taking any non-prescription drugs, vitamins, minerals, herbals or other nutritional supplements  Any of these may interact with some food or medicines and cause a reaction      Pre-Surgery Instructions:   Medication Instructions    cholecalciferol (VITAMIN D3) 1,000 units tablet Hold day of surgery   multivitamin (THERAGRAN) TABS Hold day of surgery   oxyCODONE (Roxicodone) 5 immediate release tablet Hold day of surgery   pantoprazole (PROTONIX) 40 mg tablet Hold day of surgery  To follow surgeon's fasting protocol

## 2022-07-15 ENCOUNTER — TELEPHONE (OUTPATIENT)
Dept: BARIATRICS | Facility: CLINIC | Age: 27
End: 2022-07-15

## 2022-07-16 ENCOUNTER — ANESTHESIA EVENT (OUTPATIENT)
Dept: PERIOP | Facility: HOSPITAL | Age: 27
DRG: 621 | End: 2022-07-16
Payer: COMMERCIAL

## 2022-07-16 NOTE — ANESTHESIA PREPROCEDURE EVALUATION
Procedure:  LAPAROSCOPIC CECIEL-EN-Y GASTRIC BYPASS AND INTRAOPERATIVE EGD (N/A Abdomen)    Relevant Problems   GI/HEPATIC   (+) Gastroesophageal reflux disease      NEURO/PSYCH   (+) Anxiety      Other   (+) Morbid obesity with BMI of 50 0-59 9, adult (HCC)   (+) Recurrent epistaxis        Physical Exam    Airway    Mallampati score: III  TM Distance: >3 FB  Neck ROM: full     Dental       Cardiovascular  Rhythm: regular, Rate: normal,     Pulmonary  Breath sounds clear to auscultation,     Other Findings        Anesthesia Plan  ASA Score- 3     Anesthesia Type- general with ASA Monitors  Additional Monitors:   Airway Plan: ETT  Plan Factors-    Chart reviewed  Patient is not a current smoker  Induction- intravenous  Postoperative Plan- Plan for postoperative opioid use  Informed Consent- Anesthetic plan and risks discussed with patient  I personally reviewed this patient with the CRNA  Discussed and agreed on the Anesthesia Plan with the CRNA  Andie Sweeney

## 2022-07-18 ENCOUNTER — ANESTHESIA (OUTPATIENT)
Dept: PERIOP | Facility: HOSPITAL | Age: 27
DRG: 621 | End: 2022-07-18
Payer: COMMERCIAL

## 2022-07-18 ENCOUNTER — HOSPITAL ENCOUNTER (INPATIENT)
Facility: HOSPITAL | Age: 27
LOS: 1 days | Discharge: HOME | DRG: 621 | End: 2022-07-19
Attending: SURGERY | Admitting: SURGERY
Payer: COMMERCIAL

## 2022-07-18 DIAGNOSIS — L76.82 INCISIONAL PAIN: Primary | ICD-10-CM

## 2022-07-18 DIAGNOSIS — R11.0 NAUSEA: ICD-10-CM

## 2022-07-18 PROBLEM — E78.5 HLD (HYPERLIPIDEMIA): Status: ACTIVE | Noted: 2022-07-18

## 2022-07-18 PROBLEM — K21.9 GASTROESOPHAGEAL REFLUX DISEASE: Status: ACTIVE | Noted: 2022-07-18

## 2022-07-18 LAB
EXT PREGNANCY TEST URINE: NEGATIVE
EXT. CONTROL: NORMAL

## 2022-07-18 PROCEDURE — 43644 LAP GASTRIC BYPASS/ROUX-EN-Y: CPT | Performed by: SURGERY

## 2022-07-18 PROCEDURE — 0DJ08ZZ INSPECTION OF UPPER INTESTINAL TRACT, VIA NATURAL OR ARTIFICIAL OPENING ENDOSCOPIC: ICD-10-PCS | Performed by: SURGERY

## 2022-07-18 PROCEDURE — C1781 MESH (IMPLANTABLE): HCPCS | Performed by: SURGERY

## 2022-07-18 PROCEDURE — NC001 PR NO CHARGE: Performed by: SURGERY

## 2022-07-18 PROCEDURE — C9290 INJ, BUPIVACAINE LIPOSOME: HCPCS | Performed by: PHYSICIAN ASSISTANT

## 2022-07-18 PROCEDURE — 0D164ZA BYPASS STOMACH TO JEJUNUM, PERCUTANEOUS ENDOSCOPIC APPROACH: ICD-10-PCS | Performed by: SURGERY

## 2022-07-18 PROCEDURE — 81025 URINE PREGNANCY TEST: CPT | Performed by: ANESTHESIOLOGY

## 2022-07-18 DEVICE — SEAMGUARD STPL REINF ENDO GIA ULTRA UNIV 60 PURPLE: Type: IMPLANTABLE DEVICE | Site: ABDOMEN | Status: FUNCTIONAL

## 2022-07-18 RX ORDER — PROPOFOL 10 MG/ML
INJECTION, EMULSION INTRAVENOUS AS NEEDED
Status: DISCONTINUED | OUTPATIENT
Start: 2022-07-18 | End: 2022-07-18

## 2022-07-18 RX ORDER — GABAPENTIN 100 MG/1
300 CAPSULE ORAL ONCE
Status: COMPLETED | OUTPATIENT
Start: 2022-07-18 | End: 2022-07-18

## 2022-07-18 RX ORDER — OXYCODONE HCL 5 MG/5 ML
5 SOLUTION, ORAL ORAL EVERY 4 HOURS PRN
Status: DISCONTINUED | OUTPATIENT
Start: 2022-07-18 | End: 2022-07-19 | Stop reason: HOSPADM

## 2022-07-18 RX ORDER — SCOLOPAMINE TRANSDERMAL SYSTEM 1 MG/1
1 PATCH, EXTENDED RELEASE TRANSDERMAL ONCE
Status: DISCONTINUED | OUTPATIENT
Start: 2022-07-18 | End: 2022-07-19 | Stop reason: HOSPADM

## 2022-07-18 RX ORDER — CELECOXIB 100 MG/1
200 CAPSULE ORAL ONCE
Status: COMPLETED | OUTPATIENT
Start: 2022-07-18 | End: 2022-07-18

## 2022-07-18 RX ORDER — CEFAZOLIN SODIUM 2 G/50ML
2000 SOLUTION INTRAVENOUS EVERY 8 HOURS
Status: COMPLETED | OUTPATIENT
Start: 2022-07-18 | End: 2022-07-19

## 2022-07-18 RX ORDER — ACETAMINOPHEN 325 MG/1
975 TABLET ORAL EVERY 6 HOURS SCHEDULED
Status: DISCONTINUED | OUTPATIENT
Start: 2022-07-18 | End: 2022-07-19 | Stop reason: HOSPADM

## 2022-07-18 RX ORDER — METRONIDAZOLE 500 MG/100ML
500 INJECTION, SOLUTION INTRAVENOUS EVERY 8 HOURS SCHEDULED
Status: COMPLETED | OUTPATIENT
Start: 2022-07-18 | End: 2022-07-19

## 2022-07-18 RX ORDER — SIMETHICONE 80 MG
80 TABLET,CHEWABLE ORAL EVERY 12 HOURS SCHEDULED
Status: DISCONTINUED | OUTPATIENT
Start: 2022-07-18 | End: 2022-07-19 | Stop reason: HOSPADM

## 2022-07-18 RX ORDER — FENTANYL CITRATE 50 UG/ML
INJECTION, SOLUTION INTRAMUSCULAR; INTRAVENOUS AS NEEDED
Status: DISCONTINUED | OUTPATIENT
Start: 2022-07-18 | End: 2022-07-18

## 2022-07-18 RX ORDER — ROCURONIUM BROMIDE 10 MG/ML
INJECTION, SOLUTION INTRAVENOUS AS NEEDED
Status: DISCONTINUED | OUTPATIENT
Start: 2022-07-18 | End: 2022-07-18

## 2022-07-18 RX ORDER — OXYCODONE HCL 5 MG/5 ML
10 SOLUTION, ORAL ORAL EVERY 4 HOURS PRN
Status: DISCONTINUED | OUTPATIENT
Start: 2022-07-18 | End: 2022-07-19 | Stop reason: HOSPADM

## 2022-07-18 RX ORDER — SODIUM CHLORIDE, SODIUM LACTATE, POTASSIUM CHLORIDE, CALCIUM CHLORIDE 600; 310; 30; 20 MG/100ML; MG/100ML; MG/100ML; MG/100ML
75 INJECTION, SOLUTION INTRAVENOUS CONTINUOUS
Status: DISCONTINUED | OUTPATIENT
Start: 2022-07-18 | End: 2022-07-18

## 2022-07-18 RX ORDER — SODIUM CHLORIDE, SODIUM LACTATE, POTASSIUM CHLORIDE, CALCIUM CHLORIDE 600; 310; 30; 20 MG/100ML; MG/100ML; MG/100ML; MG/100ML
100 INJECTION, SOLUTION INTRAVENOUS CONTINUOUS
Status: DISCONTINUED | OUTPATIENT
Start: 2022-07-18 | End: 2022-07-19 | Stop reason: HOSPADM

## 2022-07-18 RX ORDER — SODIUM CHLORIDE 9 MG/ML
INJECTION, SOLUTION INTRAVENOUS CONTINUOUS PRN
Status: DISCONTINUED | OUTPATIENT
Start: 2022-07-18 | End: 2022-07-18

## 2022-07-18 RX ORDER — CEFAZOLIN SODIUM 2 G/50ML
2000 SOLUTION INTRAVENOUS ONCE
Status: DISCONTINUED | OUTPATIENT
Start: 2022-07-18 | End: 2022-07-18 | Stop reason: HOSPADM

## 2022-07-18 RX ORDER — CEFAZOLIN SODIUM 1 G/3ML
INJECTION, POWDER, FOR SOLUTION INTRAMUSCULAR; INTRAVENOUS AS NEEDED
Status: DISCONTINUED | OUTPATIENT
Start: 2022-07-18 | End: 2022-07-18

## 2022-07-18 RX ORDER — ONDANSETRON 2 MG/ML
4 INJECTION INTRAMUSCULAR; INTRAVENOUS ONCE AS NEEDED
Status: DISCONTINUED | OUTPATIENT
Start: 2022-07-18 | End: 2022-07-18 | Stop reason: HOSPADM

## 2022-07-18 RX ORDER — ONDANSETRON 2 MG/ML
4 INJECTION INTRAMUSCULAR; INTRAVENOUS EVERY 6 HOURS PRN
Status: DISCONTINUED | OUTPATIENT
Start: 2022-07-18 | End: 2022-07-19 | Stop reason: HOSPADM

## 2022-07-18 RX ORDER — FENTANYL CITRATE/PF 50 MCG/ML
25 SYRINGE (ML) INJECTION
Status: DISCONTINUED | OUTPATIENT
Start: 2022-07-18 | End: 2022-07-18 | Stop reason: HOSPADM

## 2022-07-18 RX ORDER — METOCLOPRAMIDE HYDROCHLORIDE 5 MG/ML
10 INJECTION INTRAMUSCULAR; INTRAVENOUS EVERY 6 HOURS PRN
Status: DISCONTINUED | OUTPATIENT
Start: 2022-07-18 | End: 2022-07-19 | Stop reason: HOSPADM

## 2022-07-18 RX ORDER — LIDOCAINE HYDROCHLORIDE 10 MG/ML
INJECTION, SOLUTION EPIDURAL; INFILTRATION; INTRACAUDAL; PERINEURAL AS NEEDED
Status: DISCONTINUED | OUTPATIENT
Start: 2022-07-18 | End: 2022-07-18

## 2022-07-18 RX ORDER — MIDAZOLAM HYDROCHLORIDE 2 MG/2ML
INJECTION, SOLUTION INTRAMUSCULAR; INTRAVENOUS AS NEEDED
Status: DISCONTINUED | OUTPATIENT
Start: 2022-07-18 | End: 2022-07-18

## 2022-07-18 RX ORDER — HEPARIN SODIUM 5000 [USP'U]/ML
5000 INJECTION, SOLUTION INTRAVENOUS; SUBCUTANEOUS
Status: COMPLETED | OUTPATIENT
Start: 2022-07-18 | End: 2022-07-18

## 2022-07-18 RX ORDER — PROMETHAZINE HYDROCHLORIDE 25 MG/ML
25 INJECTION, SOLUTION INTRAMUSCULAR; INTRAVENOUS EVERY 6 HOURS PRN
Status: DISCONTINUED | OUTPATIENT
Start: 2022-07-18 | End: 2022-07-19 | Stop reason: HOSPADM

## 2022-07-18 RX ORDER — HYDROMORPHONE HCL/PF 1 MG/ML
1 SYRINGE (ML) INJECTION EVERY 4 HOURS PRN
Status: DISCONTINUED | OUTPATIENT
Start: 2022-07-18 | End: 2022-07-19 | Stop reason: HOSPADM

## 2022-07-18 RX ORDER — KETOROLAC TROMETHAMINE 30 MG/ML
15 INJECTION, SOLUTION INTRAMUSCULAR; INTRAVENOUS EVERY 6 HOURS SCHEDULED
Status: DISCONTINUED | OUTPATIENT
Start: 2022-07-18 | End: 2022-07-19 | Stop reason: HOSPADM

## 2022-07-18 RX ORDER — LORAZEPAM 2 MG/ML
0.5 INJECTION INTRAMUSCULAR EVERY 6 HOURS PRN
Status: DISCONTINUED | OUTPATIENT
Start: 2022-07-18 | End: 2022-07-19 | Stop reason: HOSPADM

## 2022-07-18 RX ORDER — ACETAMINOPHEN 325 MG/1
975 TABLET ORAL ONCE
Status: COMPLETED | OUTPATIENT
Start: 2022-07-18 | End: 2022-07-18

## 2022-07-18 RX ORDER — MAGNESIUM HYDROXIDE 1200 MG/15ML
LIQUID ORAL AS NEEDED
Status: DISCONTINUED | OUTPATIENT
Start: 2022-07-18 | End: 2022-07-18 | Stop reason: HOSPADM

## 2022-07-18 RX ORDER — BUPIVACAINE HYDROCHLORIDE 5 MG/ML
INJECTION, SOLUTION PERINEURAL AS NEEDED
Status: DISCONTINUED | OUTPATIENT
Start: 2022-07-18 | End: 2022-07-18 | Stop reason: HOSPADM

## 2022-07-18 RX ORDER — DIPHENHYDRAMINE HCL 25 MG
25 TABLET ORAL
Status: DISCONTINUED | OUTPATIENT
Start: 2022-07-18 | End: 2022-07-19 | Stop reason: HOSPADM

## 2022-07-18 RX ORDER — FAMOTIDINE 10 MG/ML
20 INJECTION, SOLUTION INTRAVENOUS EVERY 12 HOURS SCHEDULED
Status: DISCONTINUED | OUTPATIENT
Start: 2022-07-18 | End: 2022-07-19 | Stop reason: HOSPADM

## 2022-07-18 RX ORDER — METRONIDAZOLE 500 MG/100ML
500 INJECTION, SOLUTION INTRAVENOUS ONCE
Status: COMPLETED | OUTPATIENT
Start: 2022-07-18 | End: 2022-07-18

## 2022-07-18 RX ORDER — PROPOFOL 10 MG/ML
INJECTION, EMULSION INTRAVENOUS CONTINUOUS PRN
Status: DISCONTINUED | OUTPATIENT
Start: 2022-07-18 | End: 2022-07-18

## 2022-07-18 RX ADMIN — SIMETHICONE 80 MG: 80 TABLET, CHEWABLE ORAL at 20:28

## 2022-07-18 RX ADMIN — HEPARIN SODIUM 5000 UNITS: 5000 INJECTION INTRAVENOUS; SUBCUTANEOUS at 08:09

## 2022-07-18 RX ADMIN — SODIUM CHLORIDE, POTASSIUM CHLORIDE, SODIUM LACTATE AND CALCIUM CHLORIDE 100 ML/HR: 600; 310; 30; 20 INJECTION, SOLUTION INTRAVENOUS at 23:27

## 2022-07-18 RX ADMIN — ACETAMINOPHEN 975 MG: 325 TABLET ORAL at 17:56

## 2022-07-18 RX ADMIN — SODIUM CHLORIDE: 0.9 INJECTION, SOLUTION INTRAVENOUS at 10:26

## 2022-07-18 RX ADMIN — ROCURONIUM BROMIDE 30 MG: 10 INJECTION, SOLUTION INTRAVENOUS at 10:55

## 2022-07-18 RX ADMIN — PROPOFOL 120 MCG/KG/MIN: 10 INJECTION, EMULSION INTRAVENOUS at 12:15

## 2022-07-18 RX ADMIN — FENTANYL CITRATE 100 MCG: 50 INJECTION, SOLUTION INTRAMUSCULAR; INTRAVENOUS at 10:22

## 2022-07-18 RX ADMIN — PHENYLEPHRINE HYDROCHLORIDE 20 MCG/MIN: 10 INJECTION INTRAVENOUS at 10:28

## 2022-07-18 RX ADMIN — PROPOFOL 120 MCG/KG/MIN: 10 INJECTION, EMULSION INTRAVENOUS at 11:57

## 2022-07-18 RX ADMIN — CEFAZOLIN SODIUM 2000 MG: 2 SOLUTION INTRAVENOUS at 17:50

## 2022-07-18 RX ADMIN — ROCURONIUM BROMIDE 20 MG: 10 INJECTION, SOLUTION INTRAVENOUS at 11:27

## 2022-07-18 RX ADMIN — SODIUM CHLORIDE, POTASSIUM CHLORIDE, SODIUM LACTATE AND CALCIUM CHLORIDE 100 ML/HR: 600; 310; 30; 20 INJECTION, SOLUTION INTRAVENOUS at 14:23

## 2022-07-18 RX ADMIN — DEXMEDETOMIDINE 0.2 MCG/KG/HR: 100 INJECTION, SOLUTION, CONCENTRATE INTRAVENOUS at 10:26

## 2022-07-18 RX ADMIN — CELECOXIB 200 MG: 100 CAPSULE ORAL at 08:47

## 2022-07-18 RX ADMIN — LIDOCAINE HYDROCHLORIDE 50 MG: 10 INJECTION, SOLUTION EPIDURAL; INFILTRATION; INTRACAUDAL; PERINEURAL at 10:22

## 2022-07-18 RX ADMIN — SUGAMMADEX 400 MG: 100 INJECTION, SOLUTION INTRAVENOUS at 12:27

## 2022-07-18 RX ADMIN — ROCURONIUM BROMIDE 50 MG: 10 INJECTION, SOLUTION INTRAVENOUS at 10:22

## 2022-07-18 RX ADMIN — ACETAMINOPHEN 975 MG: 325 TABLET ORAL at 08:04

## 2022-07-18 RX ADMIN — CEFAZOLIN SODIUM 2000 MG: 1 INJECTION, POWDER, FOR SOLUTION INTRAMUSCULAR; INTRAVENOUS at 10:19

## 2022-07-18 RX ADMIN — ONDANSETRON 4 MG: 2 INJECTION INTRAMUSCULAR; INTRAVENOUS at 23:22

## 2022-07-18 RX ADMIN — PROPOFOL 100 MCG/KG/MIN: 10 INJECTION, EMULSION INTRAVENOUS at 10:26

## 2022-07-18 RX ADMIN — FAMOTIDINE 20 MG: 10 INJECTION, SOLUTION INTRAVENOUS at 17:48

## 2022-07-18 RX ADMIN — KETOROLAC TROMETHAMINE 15 MG: 30 INJECTION, SOLUTION INTRAMUSCULAR at 23:14

## 2022-07-18 RX ADMIN — METRONIDAZOLE 500 MG: 500 INJECTION, SOLUTION INTRAVENOUS at 17:51

## 2022-07-18 RX ADMIN — ACETAMINOPHEN 975 MG: 325 TABLET ORAL at 23:15

## 2022-07-18 RX ADMIN — SCOPALAMINE 1 PATCH: 1 PATCH, EXTENDED RELEASE TRANSDERMAL at 08:07

## 2022-07-18 RX ADMIN — PROPOFOL 200 MG: 10 INJECTION, EMULSION INTRAVENOUS at 10:22

## 2022-07-18 RX ADMIN — SODIUM CHLORIDE, SODIUM LACTATE, POTASSIUM CHLORIDE, AND CALCIUM CHLORIDE 75 ML/HR: .6; .31; .03; .02 INJECTION, SOLUTION INTRAVENOUS at 09:04

## 2022-07-18 RX ADMIN — KETOROLAC TROMETHAMINE 15 MG: 30 INJECTION, SOLUTION INTRAMUSCULAR at 17:48

## 2022-07-18 RX ADMIN — MIDAZOLAM 2 MG: 1 INJECTION INTRAMUSCULAR; INTRAVENOUS at 10:17

## 2022-07-18 RX ADMIN — ONDANSETRON 4 MG: 2 INJECTION INTRAMUSCULAR; INTRAVENOUS at 14:21

## 2022-07-18 RX ADMIN — GABAPENTIN 300 MG: 100 CAPSULE ORAL at 08:06

## 2022-07-18 RX ADMIN — METRONIDAZOLE: 500 INJECTION, SOLUTION INTRAVENOUS at 10:30

## 2022-07-18 NOTE — H&P
Patient seen and examined by me  H&P updated  Original H&P on paper in chart      /52   Pulse 73   Temp 98 2 °F (36 8 °C) (Temporal)   Resp 18   Ht 5' 1" (1 549 m)   Wt 103 kg (228 lb 0 5 oz)   LMP 07/09/2022   SpO2 96%   BMI 43 09 kg/m²       Rusty Sutton MD  7/18/2022  9:22 AM

## 2022-07-18 NOTE — PLAN OF CARE
Problem: Potential for Falls  Goal: Patient will remain free of falls  Description: INTERVENTIONS:  - Educate patient/family on patient safety including physical limitations  - Instruct patient to call for assistance with activity   - Consult OT/PT to assist with strengthening/mobility   - Keep Call bell within reach  - Keep bed low and locked with side rails adjusted as appropriate  - Keep care items and personal belongings within reach  - Initiate and maintain comfort rounds  - Make Fall Risk Sign visible to staff  - Offer Toileting every 2 Hours, in advance of need  - Initiate/Maintain bed, chair alarm  - Obtain necessary fall risk management equipment: alarms  - Apply yellow socks and bracelet for high fall risk patients  - Consider moving patient to room near nurses station  Outcome: Progressing     Problem: PAIN - ADULT  Goal: Verbalizes/displays adequate comfort level or baseline comfort level  Description: Interventions:  - Encourage patient to monitor pain and request assistance  - Assess pain using appropriate pain scale  - Administer analgesics based on type and severity of pain and evaluate response  - Implement non-pharmacological measures as appropriate and evaluate response  - Consider cultural and social influences on pain and pain management  - Notify physician/advanced practitioner if interventions unsuccessful or patient reports new pain  Outcome: Progressing     Problem: INFECTION - ADULT  Goal: Absence or prevention of progression during hospitalization  Description: INTERVENTIONS:  - Assess and monitor for signs and symptoms of infection  - Monitor lab/diagnostic results  - Monitor all insertion sites, i e  indwelling lines, tubes, and drains  - Monitor endotracheal if appropriate and nasal secretions for changes in amount and color  - [place] appropriate cooling/warming therapies per order  - Administer medications as ordered  - Instruct and encourage patient and family to use good hand hygiene technique  - Identify and instruct in appropriate isolation precautions for identified infection/condition  Outcome: Progressing           Problem: DISCHARGE PLANNING  Goal: Discharge to home or other facility with appropriate resources  Description: INTERVENTIONS:  - Identify barriers to discharge w/patient and caregiver  - Arrange for needed discharge resources and transportation as appropriate  - Identify discharge learning needs (meds, wound care, etc )  - Arrange for interpretive services to assist at discharge as needed  - Refer to Case Management Department for coordinating discharge planning if the patient needs post-hospital services based on physician/advanced practitioner order or complex needs related to functional status, cognitive ability, or social support system  Outcome: Progressing     Problem: Knowledge Deficit  Goal: Patient/family/caregiver demonstrates understanding of disease process, treatment plan, medications, and discharge instructions  Description: Complete learning assessment and assess knowledge base    Interventions:  - Provide teaching at level of understanding  - Provide teaching via preferred learning methods  Outcome: Progressing     Problem: SKIN/TISSUE INTEGRITY - ADULT  Goal: Incision(s), wounds(s) or drain site(s) healing without S/S of infection  Description: INTERVENTIONS  - Assess and document dressing, incision, wound bed, drain sites and surrounding tissue  - Provide patient and family education  - Perform skin care/dressing changes every shift or prn  Outcome: Progressing

## 2022-07-18 NOTE — OP NOTE
OPERATIVE REPORT  PATIENT NAME: Esther Palumbo    :  1995  MRN: [id number]  Pt Location: [facility] OR ROOM 02    SURGERY DATE: 2022    Surgeon(s) and Role:     * Parvez Diego MD - Primary     * Donis Garrison MD - Assisting     * Fortunato Thomas PA-C - Assisting    Preop Diagnosis:  Morbid obesity ([facility] ) [E66 01]  Anxiety [F41 9]    Post-Op Diagnosis Codes:     * Morbid obesity ([facility] ) [E66 01]     * Anxiety [F41 9]    Procedure(s) (LRB):  LAPAROSCOPIC CECILE-EN-Y GASTRIC BYPASS AND INTRAOPERATIVE EGD (N/A)    Specimen(s):  * No specimens in log *    Estimated Blood Loss:   20 ml     Drains:  * No LDAs found *    Anesthesia Type:   General    Operative Indications: Morbid obesity ([facility] ) [E66 01]  Anxiety [F41 9]  BMI 43 09 kg/m2    Patient Active Problem List   Diagnosis    Eczema    Morbid obesity with BMI of 50 0-59 9, adult ([facility] )    Vitamin D deficiency    Nasal vestibulitis    Recurrent epistaxis    Encounter for hearing evaluation    Morbid (severe) obesity due to excess calories ([facility] )    Anxiety    Gastroesophageal reflux disease    HLD (hyperlipidemia)         Operative Findings:  Negative leak test     Complications:   None    Procedure and Technique:  The patient was identified by name, armband and conversation  The patient was then brought to the operative theatre  After successful induction of general anesthesia, the patient was prepped and draped in the usual sterile fashion  A timeout was performed and all were in agreement  Optiview technique was used to gain entrance into the abdomen with a 12 mm 0 degree laparoscope in the left upper quadrant  The abdomen was then insufflated to 15 mmHg  Three 12 mm ports were then placed in the right upper quadrant, left lower quadrant, and umbilicus  A 5 mm right lower quadrant port was placed  A 5 mm epigastric liver retractor was placed  Four quadrant Exparel/Marcaine transversus abdominus plane block was performed    The omentum was split using ultrasonic sukumar  Starting at the ligament of treitz 50 cm of small bowel was counted and divided with Medtronic stapler, tan load  Another 150 cm of small bowel was counted from here and a stapled jejunojejunostomy was created with the Medtronic stapler, tan load  The enterotomy was closed with a running 3-0 PDS  The mesenteric defect was then closed with a running 2-0 ethibond  The gastroesophageal fat pad was dissected  Perigastric dissection was used to enter the lesser sac 5 cm distal to the gastroesophageal junction  Gastric pouch was then created with one horizontal firing and two vertical firings with the Medtronic stapler, purple load  The nara limb was then brought antecolic/antegastric and a handsewn end to side gastrojejunostomy was then created with 3-0 PDS in two layers over a 34 [employer] [place] levacuator tube  The endoscope was then advanced past the posterior pharynx into the gastric pouch  Air leak test was negative and the anastomosis was hemostatic  Brown's space was then closed with a running 2-0 ethibond suture  The umbilical port was then closed with a transfascial 0 vicryl suture  All port sites were examined for bleeding as we exited the abdomen to ensure hemostasis  Port sites were then closed with monocryl and dermabond  All instrument, sponge and needle counts were correct  I was present for the entire procedure, A qualified resident physician was not available and an assistant was required during the procedure for retraction tissue handling,dissection and suturing, traction/countertraction, stapling, and performance of the intraoperative EGD       Patient Disposition:  PACU       SIGNATURE: Alden Tamayo MD  DATE: July 18, 2022  TIME: 12:17 PM

## 2022-07-18 NOTE — ANESTHESIA POSTPROCEDURE EVALUATION
Post-Op Assessment Note    CV Status:  Stable  Pain Score: 0    Pain management: adequate     Mental Status:  Alert and awake   Hydration Status:  Euvolemic   PONV Controlled:  Controlled   Airway Patency:  Patent      Post Op Vitals Reviewed: Yes      Staff: CRNA, Anesthesiologist   Comments: vss        No complications documented      BP   125/67   Temp   98   Pulse  79   Resp   17   SpO2   99

## 2022-07-19 VITALS
HEIGHT: 61 IN | BODY MASS INDEX: 43.05 KG/M2 | SYSTOLIC BLOOD PRESSURE: 105 MMHG | RESPIRATION RATE: 16 BRPM | TEMPERATURE: 98.7 F | OXYGEN SATURATION: 97 % | DIASTOLIC BLOOD PRESSURE: 50 MMHG | HEART RATE: 58 BPM | WEIGHT: 228 LBS

## 2022-07-19 LAB
ANION GAP SERPL CALCULATED.3IONS-SCNC: 8 MMOL/L (ref 4–13)
BUN SERPL-MCNC: 11 MG/DL (ref 5–25)
CALCIUM SERPL-MCNC: 8.1 MG/DL (ref 8.3–10.1)
CHLORIDE SERPL-SCNC: 106 MMOL/L (ref 96–108)
CO2 SERPL-SCNC: 26 MMOL/L (ref 21–32)
CREAT SERPL-MCNC: 0.74 MG/DL (ref 0.6–1.3)
ERYTHROCYTE [DISTWIDTH] IN BLOOD BY AUTOMATED COUNT: 13.2 % (ref 11.6–15.1)
GFR SERPL CREATININE-BSD FRML MDRD: 110 ML/MIN/1.73SQ M
GLUCOSE SERPL-MCNC: 87 MG/DL (ref 65–140)
HCT VFR BLD AUTO: 33.5 % (ref 34.8–46.1)
HGB BLD-MCNC: 11.4 G/DL (ref 11.5–15.4)
MCH RBC QN AUTO: 29.9 PG (ref 26.8–34.3)
MCHC RBC AUTO-ENTMCNC: 34 G/DL (ref 31.4–37.4)
MCV RBC AUTO: 88 FL (ref 82–98)
PLATELET # BLD AUTO: 299 THOUSANDS/UL (ref 149–390)
PMV BLD AUTO: 10.3 FL (ref 8.9–12.7)
POTASSIUM SERPL-SCNC: 3.5 MMOL/L (ref 3.5–5.3)
RBC # BLD AUTO: 3.81 MILLION/UL (ref 3.81–5.12)
SODIUM SERPL-SCNC: 140 MMOL/L (ref 135–147)
WBC # BLD AUTO: 9.46 THOUSAND/UL (ref 4.31–10.16)

## 2022-07-19 PROCEDURE — 99024 POSTOP FOLLOW-UP VISIT: CPT | Performed by: SURGERY

## 2022-07-19 PROCEDURE — 80048 BASIC METABOLIC PNL TOTAL CA: CPT | Performed by: PHYSICIAN ASSISTANT

## 2022-07-19 PROCEDURE — NC001 PR NO CHARGE: Performed by: SURGERY

## 2022-07-19 PROCEDURE — 85027 COMPLETE CBC AUTOMATED: CPT | Performed by: PHYSICIAN ASSISTANT

## 2022-07-19 RX ORDER — ONDANSETRON 4 MG/1
4 TABLET, ORALLY DISINTEGRATING ORAL EVERY 6 HOURS PRN
Qty: 20 TABLET | Refills: 0 | Status: SHIPPED | OUTPATIENT
Start: 2022-07-19

## 2022-07-19 RX ORDER — ACETAMINOPHEN 325 MG/1
975 TABLET ORAL EVERY 8 HOURS SCHEDULED
Qty: 63 TABLET | Refills: 0
Start: 2022-07-19 | End: 2022-07-26

## 2022-07-19 RX ADMIN — METRONIDAZOLE 500 MG: 500 INJECTION, SOLUTION INTRAVENOUS at 06:20

## 2022-07-19 RX ADMIN — CEFAZOLIN SODIUM 2000 MG: 2 SOLUTION INTRAVENOUS at 02:27

## 2022-07-19 RX ADMIN — ACETAMINOPHEN 975 MG: 325 TABLET ORAL at 13:16

## 2022-07-19 RX ADMIN — ACETAMINOPHEN 975 MG: 325 TABLET ORAL at 06:20

## 2022-07-19 RX ADMIN — SIMETHICONE 80 MG: 80 TABLET, CHEWABLE ORAL at 09:30

## 2022-07-19 RX ADMIN — FAMOTIDINE 20 MG: 10 INJECTION, SOLUTION INTRAVENOUS at 09:30

## 2022-07-19 RX ADMIN — OXYCODONE HYDROCHLORIDE 5 MG: 5 SOLUTION ORAL at 00:01

## 2022-07-19 RX ADMIN — KETOROLAC TROMETHAMINE 15 MG: 30 INJECTION, SOLUTION INTRAMUSCULAR at 06:20

## 2022-07-19 RX ADMIN — KETOROLAC TROMETHAMINE 15 MG: 30 INJECTION, SOLUTION INTRAMUSCULAR at 13:16

## 2022-07-19 NOTE — DISCHARGE SUMMARY
Discharge Summary - Domenico Christina 28 y o  female MRN: [id number]    Unit/Bed#: [address] [place] [address] Encounter: [id number]      Pre-Operative Diagnosis: Pre-Op Diagnosis Codes:     * Morbid obesity ([address] ) [E66 01]     * Anxiety [F41 9]    Post-Operative Diagnosis: Post-Op Diagnosis Codes:     * Morbid obesity ([address] ) [E66 01]     * Anxiety [F41 9]    Procedures Performed:  Procedure(s):  LAPAROSCOPIC CECILE-EN-Y GASTRIC BYPASS AND INTRAOPERATIVE EGD    Surgeon: Sloane Huber MD    See H & P for full details of admission and Operative Note for full details of operations performed  Hospital Course:  Patient was admitted for a Laparoscopic Cecile-En-Y Gastric Bypass  Post operatively pain was controlled with oral analgesics and the patient is ambulating/micturating without difficulty  Vital signs and lab work were stable  The patient is tolerating clear liquid diet without nausea or vomiting  The patient is cleared for D/C by the surgeon on POD1  Patient was seen and examined prior to discharge  Provisions for Follow-Up Care:  See After Visit Summary for information related to follow-up care and home orders  Disposition: Home, in stable condition  Patient should refer to "Discharge Instructions" for further information  Planned Readmission: No    Discharge Medications:  See After Visit Summary for reconciled discharge medications provided to patient and family  Post Operative instructions: Reviewed with patient and/or family  This text is generated with voice recognition software  There may be translation, syntax,  or grammatical errors  If you have any questions, please contact the dictating provider       Signature:   Aneta Allred PA-C  Date: 7/19/2022 Time: 7:58 AM

## 2022-07-19 NOTE — DISCHARGE INSTRUCTIONS
Bariatric/Weight Loss Surgery  Hospital Discharge Instructions  ACTIVITY:  Progress as feels comfortable - a good rule is:  if you are doing something and it begins to hurt, stop doing the activity  Walk every hour while at home  You may walk stairs if you do so slowly  You may shower 48 hours after surgery  Use your incentive spirometer 10 times per hour while awake for 1 week  Do NOT drive for 48 hours after surgery  No driving 24 hours after taking certain prescription pain medications   Examples of such medication are Percocet, Darvocet, Oxycodone, Tylenol #3, and Tylenol with Codeine  Follow your pharmacists orders  DIET  Stay on a liquid diet for 7 days after your surgery date, sipping slowly  Refer to your manual for examples of choices  Remember to keep your liquids sugar free or low calorie  You may have protein drinks  Make sure to drink 48 to 64 ounces per day of fluids  You may advance to a pureed diet one week after surgery as instructed by your diet progression pamphlet  Once you get approval from your surgeon at your first post operative visit you may advance to the soft diet  MEDICATIONS:  The abdominal nerve block will wear off during the first 1-2 days that you are home, and you may become sore  Continue to take your Tylenol and your pain medication as instructed  Start vitamins and minerals when you get home  Anti-acid Medication as per prescription  Other medications as indicated on the Physician Patient Discharge Instructions form given to you at the time of discharge  Make sure that you are splitting your pill or tablet medications in halves or fourths or even crushing them before you take them  Capsules should be opened and mixed with water or jello  You need to do this for at least 4 weeks after surgery  Eventually you will be able to take your medications the regular way as they were prescribed     You will need to consult with your Family Doctor in regards to all your prescribed medication, particularly those for blood pressure and diabetes  As you lose weight, medical conditions may change, requiring an alteration or elimination of the drug dose  DO NOT TAKE BIRTH CONTROL(BC) MEDICATIONS, INSERT BC VAGINAL RINGS, OR PLACE IUD OR ANY OTHER BC METHODS UNTIL 31 DAYS FROM DAY OF DISCHARGE FROM HOSPITAL  THIS PLACES YOU AT HIGH RISK FOR A POTENTIALLY LIFE THREATENING BLOOD CLOT  Remember to always use barrier methods for birth control and speak to your GYN about using two forms of birth control to start 31 days after surgery  It is very important to avoid pregnancy until at least 18-24 months after surgery  INCISION CARE  You may shower and get incisions wet 2 days after surgery  No soaking tub baths or swimming for 30 days after surgery  Keep abdominal area and incisions clean  Use soap and water to create a good lather and rinse off  Do not scrub incisions  If you have a drain, empty the drain as the nurses instructed  FOLLOW-UP APPOINTMENT should be made for one week after discharge  Call surgeons office at [phone] to schedule an appointment  CALL YOUR DOCTOR FOR:  pain not controlled by pain medications, a temperature greater than 101 5° F, any increase or change in drainage or redness from any incision, any vomiting or inability to keep liquids down, shortness of breath, shoulder pain, or bleeding        Letter and Information for Patient's Primary Health Care Provider      Dear Irineo Rodas,       Your patient had bariatric surgery on this admission to [address] [place] [address]  Due to the restrictive and/or malabsorptive nature of their procedure our surgeons recommend routine lab studies  Your patients surgeon will provide orders initially and ask that they continue to follow-up with us for life even if they see you  As time moves on some patients prefer to follow-up only with their family doctor   We ask that you discuss this regular work-up with them when they make an appointment to see you  *The lab studies recommended to best identify deficiencies are: CBC, CMP, Lipid Profile, Fe, TIBC, %Sat, Vitamin D, Folate, B12, Whole Blood Thiamine, Vitamin A, PTH, Zinc and Ferritin  We recommend HgbA1C for diabetics  *These studies should be done minimally at 6 months and a year post-operatively and then yearly thereafter  *Recommended Daily Supplements: Please see the attached Vitamin Sheet    If your patient has any dietary or psycho-social concerns, they can follow-up with our Team Dietitian and Licensed Clinical   They can reach these team members by calling the [address] [place] [address] Weight Management Center  We also encourage them to follow up at our regularly scheduled support groups or join our [employer] at [phone] Patient Forum  For your convenience we have also included a list of medicines that should be avoided after weight loss surgery and a list of the vitamin and minerals they should be taking for the rest of their lives  The patients are provided this information as well  The [facility] Bariatric Team would like to thank you for the opportunity to assist in the care of your patient  Feel free to contact us with any questions by calling the [address] [place] [address] Weight Management office at [phone]    Sincerely,         [address] [place] [address] Weight Management Center Team    Additional Information for Providers and Patients                      Vitamins After Fortunato en Y Gastric Bypass or Sleeve Gastrectomy Surgery    Due to the decreased absorption of nutrients and the decreased amount of food eaten it is difficult to obtain all the nutrients needed consuming food  We recommend a bariatric formulated vitamin for the rest of your life  If you wish to use an over the counter vitamins please understand you may not get all the recommended daily requirements  Use the following guidelines for over the counter vitamins  Multivitamins   We recommend 2 chewable multivitamins with iron (do not take gummy chewable as they do not contain thiamine)    You can continue with the chewable or take any well formulated, high potency multivitamin containing 22 nutrients including zinc and copper  If you decide to take a bariatric vitamin the number of vitamins that you need to take will vary  Refer to the chart provided at team meeting    Calcium - Calcium is absorbed in the part of the small bowel that is bypassed in gastric bypass patients  In addition, as you lose weight, you are more at risk for loss of bone density leading to osteoporosis  The best form of calcium is Calcium Citrate  This form of calcium is better absorbed after your surgery  Recommended daily dose is 1500 mg  Take 500 mg in (3) divided doses  You can only absorb about 500 mg at a time  We recommend 2000 IU of vitamin D3 per day, in addition to what is in your calcium supplement  If you were instructed to take a higher dose based on a deficiency, then continue to take the higher vitamin D dose  Iron - Iron is absorbed in the part of the small bowel that is bypassed  You will need to take extra iron in addition to what is already in the multivitamin if you are a menstruating woman (25-45 mg of additional elemental iron) or have been diagnosed with an iron deficiency  We recommend iron in the form of Ferrous Fumarate with Vitamin C  Follow the instructions on the package or bottle unless your physician has given other dosage amounts  B12 (Cyanocobalamin) may also be decreased  Additional Vitamin B12 is recommended if you are not taking a bariatric vitamin  Take 350 to 500 micrograms (mcg) per day of B12 (Cyanocobalamin) in a sublingual form (for under the tongue)     Note:  Calcium interferes with the absorption of iron, so it is recommended that you take the calcium at least 2 hours apart from iron  The tannins in tea also interfere with the absorption of iron  Note: Anti-ulcer medications interfere with the absorption of calcium iron and B12  Space your anti-ulcer medication 2 hours apart from your vitamins  * Based on the recommendations of the ASMBS and the National Osteoporosis foundation    Non-steroidal anti-inflammatory drugs or medications containing them  You should take caution or avoid these medications as they could harm your pouch or sleeve  **This is a sample list and is not all inclusive  Please read labels carefully  **      Non Steroidal anti-inflammatory drugs  Advil (ibuprofen)  Aleve (naproxen)  Anaprox (naproxen)  Ansaid (flurbiprofen)  Azolid (phenylbutazone)  Bextra (valdecoxib)  Butazolidin (phenylbutazone)  Celebrex (celecoxib)  Clinoril (sulindac)  Dolobid (diflunisal)  Excedrin IB (ibuprofen)  Feldene (piroxicam)  Ibuprin (ibuprofen)  Indocin (indomethacin)  Lodine (etodolac)  Meclomen (meclofenamate)  Midol IB (ibuprofen)  Motrin IB (ibuprofen)  Nalfon (fenoprofen)  Naprosyn (naproxen)  Nuprin (ibuprofen)  Orudis (ketoprofen)  Oruvail (ketoprofen)  Pamprin - IB (ibuprofen)  Ponstel (mefenamic acid)  Rexolate (sodium thiosalicylate)  Tandearil (oxyphenbutazone)  Tolectin (tolmetin)  Voltaren (diclofenac)      Barbiturate  Fiorinal (butalbital/aspirin/caffeine)    Salicylates  Amigesic (salsalate)  Anacin (aspirin)  Arthropan (choline salicylate)  Ascriptin (buffered aspirin)  Aspirin (aspirin)  Aspirtab (aspirin)  Bufferin (buffered aspirin)  Disalcid (salsalate)  Ecotrin (aspirin)  Uracel (sodium salicylate)    Analgesics  Equagesic (meprobamate/aspirin)  Micrainin (meprobamate/aspirin)  Percodan (oxycodone/aspirin)    OTC  Pepto-Bismol®  [place]®  Excedrin®    For Gastric Bypass Patients  Extended Release Medications  Sustained Release Medications  Time Released Medications

## 2022-07-19 NOTE — UTILIZATION REVIEW
Initial Clinical Review    Elective inpatient surgical procedure  Age/Sex: 28 y o  female  Surgery Date: 7/18/22  Procedure: LAPAROSCOPIC CECILE-EN-Y GASTRIC BYPASS AND INTRAOPERATIVE EGD  Anesthesia: general  Operative Findings: negative leak test    POD#1 Progress Note:   D/c home today, 7/19/22    Admission Orders: Date/Time/Statement:   Admission Orders (From admission, onward)     Ordered        07/18/22 1235  Inpatient Admission  Once                      Orders Placed This Encounter   Procedures    Inpatient Admission     Standing Status:   Standing     Number of Occurrences:   1     Order Specific Question:   Level of Care     Answer:   Med Surg [16]     Order Specific Question:   Bed Type     Answer:   Bariatric [1]     Order Specific Question:   Estimated length of stay     Answer:   Inpatient Only Surgery     Vital Signs: BP (!) 105/47 (BP Location: Right arm)   Pulse 59   Temp 98 2 °F (36 8 °C) (Oral)   Resp 18   Ht 5' 1" (1 549 m)   Wt 103 kg (228 lb)   LMP 07/09/2022   SpO2 96%   BMI 43 08 kg/m²     Pertinent Labs/Diagnostic Test Results:     Results from last 7 days   Lab Units 07/19/22  0632   WBC Thousand/uL 9 46   HEMOGLOBIN g/dL 11 4*   HEMATOCRIT % 33 5*   PLATELETS Thousands/uL 299     Results from last 7 days   Lab Units 07/19/22  0632   SODIUM mmol/L 140   POTASSIUM mmol/L 3 5   CHLORIDE mmol/L 106   CO2 mmol/L 26   ANION GAP mmol/L 8   BUN mg/dL 11   CREATININE mg/dL 0 74   EGFR ml/min/1 73sq m 110   CALCIUM mg/dL 8 1*     Results from last 7 days   Lab Units 07/19/22  0632   GLUCOSE RANDOM mg/dL 87     Diet: bariatric clear liquids  Mobility: ambulate  DVT Prophylaxis: scd     Medications/Pain Control:   Scheduled Medications:  acetaminophen, 975 mg, Oral, Q6H TALI  famotidine, 20 mg, Intravenous, Q12H TALI  ketorolac, 15 mg, Intravenous, Q6H [facility]  scopolamine, 1 patch, Transdermal, Once  simethicone, 80 mg, Oral, Q12H [facility]    Continuous IV Infusions:  lactated ringers, 100 mL/hr, Intravenous, Continuous    PRN Meds:  diphenhydrAMINE, 25 mg, Oral, HS PRN  HYDROmorphone, 1 mg, Intravenous, Q4H PRN  LORazepam, 0 5 mg, Intravenous, Q6H PRN  metoclopramide, 10 mg, Intravenous, Q6H PRN  ondansetron, 4 mg, Intravenous, Q6H PRN  oxyCODONE, 10 mg, Oral, Q4H PRN  oxyCODONE, 5 mg, Oral, Q4H PRN  phenol, 2 spray, Mouth/Throat, Q2H PRN  promethazine, 25 mg, Intramuscular, Q6H PRN    Network Utilization Review Department  ATTENTION: Please call with any questions or concerns to [phone] and carefully listen to the prompts so that you are directed to the right person  All voicemails are confidential   Farrel Bodily all requests for admission clinical reviews, approved or denied determinations and any other requests to dedicated fax number below belonging to the campus where the patient is receiving treatment   List of dedicated fax numbers for the Facilities:  [address] DENIALS (Administrative/Medical Necessity) [phone]   [address] (Maternity/NICU/Pediatrics) [phone]   [address]  [address] [facility] [place] [address] [address] [place] [address] [address] [facility] [facility] [phone]

## 2022-07-19 NOTE — PROGRESS NOTES
Progress Note - Bariatric Surgery   Chari Still 28 y o  female MRN: [id number]  Unit/Bed#: [address] [address] Encounter: [id number]      Subjective/Objective     Subjective: Tolerating liquid diet without nausea or vomiting, pain adequately controlled on oral pain medication, ambulating without assistance, voiding well, using incentive spirometer  Denies fevers, chills, sweats, SOB, CP, calf pain  Objective:    BP (!) 105/47 (BP Location: Right arm)   Pulse 59   Temp 98 2 °F (36 8 °C) (Oral)   Resp 18   Ht 5' 1" (1 549 m)   Wt 103 kg (228 lb)   LMP 07/09/2022   SpO2 96%   BMI 43 08 kg/m²       Intake/Output Summary (Last 24 hours) at 7/19/2022 0754  Last data filed at 7/18/2022 1423  Gross per 24 hour   Intake 1170 ml   Output 0 ml   Net 1170 ml       Invasive Devices  Report    Peripheral Intravenous Line  Duration           Peripheral IV 07/18/22 Left Hand <1 day    Peripheral IV 07/18/22 Left;Ventral (anterior) Forearm <1 day                ROS: 10-point system completed  All negative except see HPI  Physical Exam    General Appearance:    Alert, cooperative, no distress, appears stated age   Head:    Normocephalic, without obvious abnormality, atraumatic   Lungs:     Clear to auscultation bilaterally, respirations unlabored   Heart:    Regular rate and rhythm   Abdomen:     Soft, appropriate tenderness, non distended, incisions clean, dry, and intact   Extremities:   Extremities normal, atraumatic, no cyanosis or edema                   Lab, Imaging and other studies:  I have personally reviewed pertinent lab results    , CBC:   Lab Results   Component Value Date    WBC 9 46 07/19/2022    HGB 11 4 (L) 07/19/2022    HCT 33 5 (L) 07/19/2022    MCV 88 07/19/2022     07/19/2022    MCH 29 9 07/19/2022    MCHC 34 0 07/19/2022    RDW 13 2 07/19/2022    MPV 10 3 07/19/2022   , CMP:   Lab Results   Component Value Date    SODIUM 140 07/19/2022    K 3 5 07/19/2022     07/19/2022    CO2 26 07/19/2022    BUN 11 07/19/2022    CREATININE 0 74 07/19/2022    CALCIUM 8 1 (L) 07/19/2022    EGFR 110 07/19/2022        VTE Mechanical Prophylaxis: sequential compression device    Assessment/Plan  27 yo F s/p lap RYGB POD1 with stable post op course  Encourage PO fluids, ambulation, and incentive spirometry  Will plan for D/C home today    Plan of care was discussed with patient and patient's nurse  Care plan discussed with Dr Milagros Kim  Dispo: Continue bariatric clear liquid diet, ambulation, incentive spirometry       Toi Denver, PA-C  7/19/2022  7:55 AM

## 2022-07-20 ENCOUNTER — TELEPHONE (OUTPATIENT)
Dept: BARIATRICS | Facility: CLINIC | Age: 27
End: 2022-07-20

## 2022-07-20 NOTE — NURSING NOTE
Patient has no complaints at this time; no distress noted  AVS and medication detail reviewed with patient by Jesenia Vann RN  Patient verbalized understanding of the education and stated that she has no further questions at this time  Jesenia Vann RN removed all IV accesses  Patient verbalized presence of all personal belongings; belongings sent home with patient  Patient discharged via ambulation accompanied by student extern Gustavo Terrazas)

## 2022-07-21 ENCOUNTER — TRANSITIONAL CARE MANAGEMENT (OUTPATIENT)
Dept: FAMILY MEDICINE CLINIC | Facility: CLINIC | Age: 27
End: 2022-07-21

## 2022-07-22 ENCOUNTER — APPOINTMENT (EMERGENCY)
Dept: RADIOLOGY | Facility: HOSPITAL | Age: 27
End: 2022-07-22
Payer: COMMERCIAL

## 2022-07-22 ENCOUNTER — TELEPHONE (OUTPATIENT)
Dept: BARIATRICS | Facility: CLINIC | Age: 27
End: 2022-07-22

## 2022-07-22 ENCOUNTER — HOSPITAL ENCOUNTER (EMERGENCY)
Facility: HOSPITAL | Age: 27
Discharge: HOME | End: 2022-07-22
Attending: EMERGENCY MEDICINE
Payer: COMMERCIAL

## 2022-07-22 VITALS
TEMPERATURE: 98.4 F | OXYGEN SATURATION: 100 % | BODY MASS INDEX: 42.51 KG/M2 | RESPIRATION RATE: 18 BRPM | HEART RATE: 72 BPM | WEIGHT: 225 LBS | DIASTOLIC BLOOD PRESSURE: 57 MMHG | SYSTOLIC BLOOD PRESSURE: 114 MMHG

## 2022-07-22 DIAGNOSIS — R06.00 DYSPNEA: Primary | ICD-10-CM

## 2022-07-22 DIAGNOSIS — R07.9 CHEST PAIN: ICD-10-CM

## 2022-07-22 DIAGNOSIS — R74.8 ELEVATED LIVER ENZYMES: ICD-10-CM

## 2022-07-22 LAB
ALBUMIN SERPL BCP-MCNC: 3.5 G/DL (ref 3.5–5)
ALP SERPL-CCNC: 69 U/L (ref 46–116)
ALT SERPL W P-5'-P-CCNC: 142 U/L (ref 12–78)
ANION GAP SERPL CALCULATED.3IONS-SCNC: 13 MMOL/L (ref 4–13)
AST SERPL W P-5'-P-CCNC: 97 U/L (ref 5–45)
ATRIAL RATE: 76 BPM
BASOPHILS # BLD AUTO: 0.03 THOUSANDS/ΜL (ref 0–0.1)
BASOPHILS NFR BLD AUTO: 0 % (ref 0–1)
BILIRUB SERPL-MCNC: 0.78 MG/DL (ref 0.2–1)
BUN SERPL-MCNC: 12 MG/DL (ref 5–25)
CALCIUM SERPL-MCNC: 8.8 MG/DL (ref 8.3–10.1)
CARDIAC TROPONIN I PNL SERPL HS: 2 NG/L
CHLORIDE SERPL-SCNC: 105 MMOL/L (ref 96–108)
CO2 SERPL-SCNC: 22 MMOL/L (ref 21–32)
CREAT SERPL-MCNC: 0.68 MG/DL (ref 0.6–1.3)
EOSINOPHIL # BLD AUTO: 0.23 THOUSAND/ΜL (ref 0–0.61)
EOSINOPHIL NFR BLD AUTO: 3 % (ref 0–6)
ERYTHROCYTE [DISTWIDTH] IN BLOOD BY AUTOMATED COUNT: 13.1 % (ref 11.6–15.1)
GFR SERPL CREATININE-BSD FRML MDRD: 119 ML/MIN/1.73SQ M
GLUCOSE SERPL-MCNC: 73 MG/DL (ref 65–140)
HCT VFR BLD AUTO: 36.9 % (ref 34.8–46.1)
HGB BLD-MCNC: 12.1 G/DL (ref 11.5–15.4)
IMM GRANULOCYTES # BLD AUTO: 0.03 THOUSAND/UL (ref 0–0.2)
IMM GRANULOCYTES NFR BLD AUTO: 0 % (ref 0–2)
LIPASE SERPL-CCNC: 192 U/L (ref 73–393)
LYMPHOCYTES # BLD AUTO: 1.05 THOUSANDS/ΜL (ref 0.6–4.47)
LYMPHOCYTES NFR BLD AUTO: 14 % (ref 14–44)
MCH RBC QN AUTO: 29.2 PG (ref 26.8–34.3)
MCHC RBC AUTO-ENTMCNC: 32.8 G/DL (ref 31.4–37.4)
MCV RBC AUTO: 89 FL (ref 82–98)
MONOCYTES # BLD AUTO: 0.52 THOUSAND/ΜL (ref 0.17–1.22)
MONOCYTES NFR BLD AUTO: 7 % (ref 4–12)
NEUTROPHILS # BLD AUTO: 5.62 THOUSANDS/ΜL (ref 1.85–7.62)
NEUTS SEG NFR BLD AUTO: 76 % (ref 43–75)
NRBC BLD AUTO-RTO: 0 /100 WBCS
NT-PROBNP SERPL-MCNC: 145 PG/ML
P AXIS: 36 DEGREES
PLATELET # BLD AUTO: 309 THOUSANDS/UL (ref 149–390)
PMV BLD AUTO: 10.7 FL (ref 8.9–12.7)
POTASSIUM SERPL-SCNC: 4.2 MMOL/L (ref 3.5–5.3)
PR INTERVAL: 152 MS
PROT SERPL-MCNC: 6.8 G/DL (ref 6.4–8.4)
QRS AXIS: -3 DEGREES
QRSD INTERVAL: 84 MS
QT INTERVAL: 368 MS
QTC INTERVAL: 414 MS
RBC # BLD AUTO: 4.14 MILLION/UL (ref 3.81–5.12)
SARS-COV-2 RNA RESP QL NAA+PROBE: NEGATIVE
SODIUM SERPL-SCNC: 140 MMOL/L (ref 135–147)
T WAVE AXIS: 27 DEGREES
VENTRICULAR RATE: 76 BPM
WBC # BLD AUTO: 7.48 THOUSAND/UL (ref 4.31–10.16)

## 2022-07-22 PROCEDURE — G1004 CDSM NDSC: HCPCS

## 2022-07-22 PROCEDURE — 84484 ASSAY OF TROPONIN QUANT: CPT | Performed by: EMERGENCY MEDICINE

## 2022-07-22 PROCEDURE — 36415 COLL VENOUS BLD VENIPUNCTURE: CPT | Performed by: EMERGENCY MEDICINE

## 2022-07-22 PROCEDURE — U0003 INFECTIOUS AGENT DETECTION BY NUCLEIC ACID (DNA OR RNA); SEVERE ACUTE RESPIRATORY SYNDROME CORONAVIRUS 2 (SARS-COV-2) (CORONAVIRUS DISEASE [COVID-19]), AMPLIFIED PROBE TECHNIQUE, MAKING USE OF HIGH THROUGHPUT TECHNOLOGIES AS DESCRIBED BY CMS-2020-01-R: HCPCS | Performed by: EMERGENCY MEDICINE

## 2022-07-22 PROCEDURE — 83690 ASSAY OF LIPASE: CPT | Performed by: EMERGENCY MEDICINE

## 2022-07-22 PROCEDURE — 83880 ASSAY OF NATRIURETIC PEPTIDE: CPT | Performed by: EMERGENCY MEDICINE

## 2022-07-22 PROCEDURE — 99285 EMERGENCY DEPT VISIT HI MDM: CPT

## 2022-07-22 PROCEDURE — 93005 ELECTROCARDIOGRAM TRACING: CPT

## 2022-07-22 PROCEDURE — 85025 COMPLETE CBC W/AUTO DIFF WBC: CPT | Performed by: EMERGENCY MEDICINE

## 2022-07-22 PROCEDURE — 96360 HYDRATION IV INFUSION INIT: CPT

## 2022-07-22 PROCEDURE — 99285 EMERGENCY DEPT VISIT HI MDM: CPT | Performed by: EMERGENCY MEDICINE

## 2022-07-22 PROCEDURE — 71275 CT ANGIOGRAPHY CHEST: CPT

## 2022-07-22 PROCEDURE — U0005 INFEC AGEN DETEC AMPLI PROBE: HCPCS | Performed by: EMERGENCY MEDICINE

## 2022-07-22 PROCEDURE — 93010 ELECTROCARDIOGRAM REPORT: CPT | Performed by: INTERNAL MEDICINE

## 2022-07-22 PROCEDURE — 96361 HYDRATE IV INFUSION ADD-ON: CPT

## 2022-07-22 PROCEDURE — 80053 COMPREHEN METABOLIC PANEL: CPT | Performed by: EMERGENCY MEDICINE

## 2022-07-22 RX ADMIN — SODIUM CHLORIDE 1000 ML: 0.9 INJECTION, SOLUTION INTRAVENOUS at 13:56

## 2022-07-22 RX ADMIN — IOHEXOL 70 ML: 350 INJECTION, SOLUTION INTRAVENOUS at 15:24

## 2022-07-22 NOTE — TELEPHONE ENCOUNTER
Called to check on pt again as she reported on support page she was struggling to stay hydrated  She reports she is still finding it difficult to stay on track with fluids though she was nauseous yesterday and is not today  She did have b/m today  She also reports chest pain  It started yesterday  Mainly associated with deep breathing and coughing but she notes today it's happening at times just with normal breaths  Occassionally felt SOB  She is using her spirometer  Denies fevers  Discussed with LIZ Ford and we will send pt to be evaluated in the ER  Pt agrees with this plan

## 2022-07-25 ENCOUNTER — OFFICE VISIT (OUTPATIENT)
Dept: FAMILY MEDICINE CLINIC | Facility: CLINIC | Age: 27
End: 2022-07-25
Payer: COMMERCIAL

## 2022-07-25 ENCOUNTER — TRANSITIONAL CARE MANAGEMENT (OUTPATIENT)
Dept: FAMILY MEDICINE CLINIC | Facility: CLINIC | Age: 27
End: 2022-07-25

## 2022-07-25 VITALS
OXYGEN SATURATION: 98 % | HEIGHT: 61 IN | SYSTOLIC BLOOD PRESSURE: 108 MMHG | RESPIRATION RATE: 18 BRPM | BODY MASS INDEX: 41.69 KG/M2 | TEMPERATURE: 97.8 F | DIASTOLIC BLOOD PRESSURE: 68 MMHG | WEIGHT: 220.8 LBS | HEART RATE: 99 BPM

## 2022-07-25 DIAGNOSIS — R42 LIGHTHEADEDNESS: ICD-10-CM

## 2022-07-25 DIAGNOSIS — Z76.89 ENCOUNTER FOR SUPPORT AND COORDINATION OF TRANSITION OF CARE: Primary | ICD-10-CM

## 2022-07-25 PROCEDURE — 99496 TRANSJ CARE MGMT HIGH F2F 7D: CPT | Performed by: FAMILY MEDICINE

## 2022-07-25 NOTE — PROGRESS NOTES
TRANSITION OF CARE OFFICE VISIT  [facility] [address] [facility]    NAME: Ashlyn Vu  AGE: 28 y o  SEX: female  : 1995     DATE: 2022     Assessment and Plan:     Rajesh Amaya was seen today for transition of care management and hypotension  Diagnoses and all orders for this visit:    Encounter for support and coordination of transition of care    Lightheadedness    Reported feeling better since the ER visit on Friday  No chest pressure, palpitation, or abdominal pain  Tolerating food and water well  On adequate vitamin supplements as recommended by Bariatric office  No prior medical conditions, including DM or HTN  · Continue with current vitamin/ supplements and diet plan outlined by the weight management   · F/u surgery and dietitian  · Recommended to be careful with sudden positional changes that can induce lightheadedness  · Well healing surgical incision sites on the abd     Transitional Care Management Review:     Ashlyn Vu is a 28 y o  female here for TCM follow-up    During the TCM phone call patient stated:    TCM Call     Date and time call was made  2022  3:44 PM    Hospital care reviewed  Records reviewed    Date of Admission  22    Date of discharge  22    Diagnosis  Morbid (severe) obesity due to excess calories ([address] )    Disposition  Home    Current Symptoms  Neausea  Pain 4/10, took Tylenol  Nausea making it hard to consume food  Keeping down some liquid  Confirmed  appointment    Neausea severity  Moderate      TCM Call     Scheduled for follow up?   Yes    I have advised the patient to call PCP with any new or worsening symptoms  YOGI Jasso           Problem List:     Patient Active Problem List   Diagnosis    Eczema    Morbid obesity with BMI of 50 0-59 9, adult ([address] )    Vitamin D deficiency    Nasal vestibulitis    Recurrent epistaxis    Encounter for hearing evaluation    Morbid (severe) obesity due to excess calories ([address] )    Anxiety    Gastroesophageal reflux disease    HLD (hyperlipidemia)        Current Medications:     Outpatient Medications Prior to Visit   Medication Sig Dispense Refill    acetaminophen (TYLENOL) 325 mg tablet Take 3 tablets (975 mg total) by mouth every 8 (eight) hours for 7 days Must cut or crush tabs 63 tablet 0    ondansetron (Zofran ODT) 4 mg disintegrating tablet Take 1 tablet (4 mg total) by mouth every 6 (six) hours as needed for nausea or vomiting 20 tablet 0    oxyCODONE (Roxicodone) 5 immediate release tablet Take 1 tablet (5 mg total) by mouth every 4 (four) hours as needed for moderate pain Max Daily Amount: 30 mg 10 tablet 0    pantoprazole (PROTONIX) 40 mg tablet Take 1 tablet (40 mg total) by mouth daily 90 tablet 1     No facility-administered medications prior to visit  HPI:     27 yo F s/p 7 days Fortunato-en-Y gastric bypass surgery presented today for TCM  · Lightheaded when standing up  · Able to tolerate food and drink  Advancing to pureed diet  · Goal is to get 48 oz fluid at this point  · On supplement for Calcium, iron, Vit D, Vit B12, and multivitamin as recommended by weight management  · Going for lab work in 3 mths for vitamin levels and CBC/CMP  · F/u with surgeon and dietitian this Friday    The following portions of the patient's history were reviewed and updated as appropriate: allergies, current medications, past family history, past medical history, past social history, past surgical history and problem list      Review of Systems:     Review of Systems   Constitutional: Negative for chills and fever  HENT: Negative for ear pain and sore throat  Eyes: Negative for pain and visual disturbance  Respiratory: Negative for cough and shortness of breath  Cardiovascular: Negative for chest pain, palpitations and leg swelling  Gastrointestinal: Negative for abdominal pain, nausea and vomiting  Genitourinary: Negative for dysuria and hematuria  Musculoskeletal: Negative for arthralgias and back pain  Skin: Negative for color change and rash  Neurological: Positive for light-headedness  Negative for seizures and syncope  All other systems reviewed and are negative  Objective:     /68   Pulse 99   Temp 97 8 °F (36 6 °C) (Tympanic)   Resp 18   Ht 5' 1" (1 549 m)   Wt 100 kg (220 lb 12 8 oz)   LMP 07/09/2022   SpO2 98%   BMI 41 72 kg/m²     Physical Exam  Constitutional:       General: She is not in acute distress  Appearance: Normal appearance  HENT:      Head: Normocephalic and atraumatic  Eyes:      Pupils: Pupils are equal, round, and reactive to light  Cardiovascular:      Rate and Rhythm: Normal rate and regular rhythm  Pulses: Normal pulses  Heart sounds: Normal heart sounds  No murmur heard  Pulmonary:      Effort: Pulmonary effort is normal  No respiratory distress  Breath sounds: Normal breath sounds  No wheezing  Abdominal:      General: Bowel sounds are normal  There is no distension  Palpations: Abdomen is soft  There is no mass  Tenderness: There is no abdominal tenderness  There is no guarding or rebound  Comments: Six incision sites on the abd, well healing, no signs infection, no drainage   Musculoskeletal:      Right lower leg: No edema  Left lower leg: No edema  Skin:     General: Skin is warm and dry  Neurological:      General: No focal deficit present  Mental Status: She is alert and oriented to person, place, and time  Motor: No weakness  Psychiatric:         Mood and Affect: Mood normal          Behavior: Behavior normal          Laboratory Results: I have personally reviewed the pertinent laboratory results/reports     Radiology/Other Diagnostic Testing Results: I have personally reviewed pertinent reports  CTA ED chest PE study    Result Date: 7/22/2022  CTA - CHEST WITH IV CONTRAST - PULMONARY ANGIOGRAM INDICATION:   sob, post op  "Patient ER with complaint of dyspnea on exertion, and chest pain for approximately 2 days  Patient is 4 days status post laparoscopic Fortunato-en-Y  She also states that she is unable to keep down the road recommended 1 oz of  fluids every 15 minutes   " COMPARISON: None  TECHNIQUE: CTA examination of the chest was performed using angiographic technique according to a protocol specifically tailored to evaluate for pulmonary embolism  Axial, sagittal, and coronal 2D reformatted images were created from the source data and  submitted for interpretation  In addition, coronal 3D MIP postprocessing was performed on the acquisition scanner  Radiation dose length product (DLP) for this visit:  588 03 mGy-cm   This examination, like all CT scans performed in the [facility], was performed utilizing techniques to minimize radiation dose exposure, including the use of iterative  reconstruction and automated exposure control  IV Contrast:  70 mL of iohexol (OMNIPAQUE)  FINDINGS: PULMONARY ARTERIAL TREE:  No pulmonary embolus is seen  LUNGS:  Lungs are clear  There is no tracheal or endobronchial lesion  PLEURA:  Unremarkable  HEART/GREAT VESSELS:  Unremarkable for patient's age  No thoracic aortic aneurysm  MEDIASTINUM AND ANUPAMA:  Small hiatal hernia  CHEST WALL AND LOWER NECK:   Unremarkable  VISUALIZED STRUCTURES IN THE UPPER ABDOMEN:  Gastric postsurgical changes  OSSEOUS STRUCTURES:  No acute fracture or destructive osseous lesion  No acute finding; specifically, no pulmonary arterial embolism or pulmonary infiltrate/consolidation   Workstation performed: [id number]         Ashley Rhodes MD  Family Medicine PGY-2

## 2022-07-29 ENCOUNTER — OFFICE VISIT (OUTPATIENT)
Dept: BARIATRICS | Facility: CLINIC | Age: 27
End: 2022-07-29

## 2022-07-29 VITALS — HEIGHT: 61 IN | BODY MASS INDEX: 41.5 KG/M2 | WEIGHT: 219.8 LBS

## 2022-07-29 VITALS
HEART RATE: 80 BPM | HEIGHT: 61 IN | BODY MASS INDEX: 41.5 KG/M2 | WEIGHT: 219.8 LBS | SYSTOLIC BLOOD PRESSURE: 102 MMHG | TEMPERATURE: 97.8 F | DIASTOLIC BLOOD PRESSURE: 68 MMHG

## 2022-07-29 DIAGNOSIS — E66.01 MORBID (SEVERE) OBESITY DUE TO EXCESS CALORIES (HCC): ICD-10-CM

## 2022-07-29 DIAGNOSIS — K91.2 POSTSURGICAL MALABSORPTION: Primary | ICD-10-CM

## 2022-07-29 DIAGNOSIS — Z48.815 ENCOUNTER FOR SURGICAL AFTERCARE FOLLOWING SURGERY ON THE DIGESTIVE SYSTEM: Primary | ICD-10-CM

## 2022-07-29 DIAGNOSIS — E78.5 HLD (HYPERLIPIDEMIA): ICD-10-CM

## 2022-07-29 DIAGNOSIS — E66.01 OBESITY, CLASS III, BMI 40-49.9 (MORBID OBESITY) (HCC): ICD-10-CM

## 2022-07-29 DIAGNOSIS — K91.2 POSTSURGICAL MALABSORPTION: ICD-10-CM

## 2022-07-29 DIAGNOSIS — F41.9 ANXIETY: ICD-10-CM

## 2022-07-29 DIAGNOSIS — K21.9 GASTROESOPHAGEAL REFLUX DISEASE: ICD-10-CM

## 2022-07-29 PROCEDURE — 99024 POSTOP FOLLOW-UP VISIT: CPT | Performed by: SURGERY

## 2022-07-29 PROCEDURE — RECHECK

## 2022-07-29 RX ORDER — MULTIVITAMIN
1 TABLET ORAL DAILY
COMMUNITY

## 2022-07-29 NOTE — PROGRESS NOTES
Weight Management Nutrition Class     Diagnosis: Morbid Obesity    Bariatric Surgeon: Dr Sammi Warren    Surgery: Gastric Bypass Laparoscopic    Class: first post op note    Topics discussed today include:     fluid goals post op, protein goals post op, constipation, chew food well, exercise, avoidance of alcohol, PPI use, diet progression, hypoglycemia, dumping syndrome, protein supplems, vitamin/mineral supplements and calcium supplements    Patient was able to verbalize basic diet (protein, fluid, vitamin and mineral) recommendations and possible nutrition-related complications  Yes     · On Pureed diet--her roommate has been pureeing whatever she makes for dinner which has been working out--shrimp stir williamson, chicken and cheese meal, pudding, applesauce, refried beans (didn't sit well), ricotta bake  · Measuring 1/4 cup  · At times adds protein powder into foods  · Protein shakes:  Premier or Equate--each 30gm, getting one per day working up to 2 per day  · Fluids:  Water, propel, body armor lyte, gatorade zero--once she started purees she feels the fluids started to go down better    32oz water + 11oz protein shake + sometimes a little of an electrolyte drink (recommended at least 1/2 of the electrolyte drink)    Vitamins:  Procare w/45mg iron one a day  Calcium 1 TID chewy calcium

## 2022-07-29 NOTE — LETTER
2022     Kalpesh Zuniga  [facility] [address] [facility]    Patient: Radha Lauren   YOB: 1995   Date of Visit: 2022       Dear Dr Veronica Diaz:    Thank you for referring Radha Lauren to me for bariatric surgery  Below are my notes for this post-op visit  Her fluid and protein intake is improving  She is doing well  If you have questions, please do not hesitate to call me  I look forward to following your patient along with you  Sincerely,        Liya Leon MD        CC: No Recipients  Liya Leon MD  2022  9:37 AM  Sign when Signing Visit  [address] [facility] 28 y o  female MRN: [id number]  Unit/Bed#:  Encounter: [id number]      HPI:  Radha Lauren is a 28 y o  female who presents for the 1st postoperative visit following a laparoscopic Cecile-en-Y gastric bypass  Her protein and water intake are improving  Her pain is controlled           Historical Information   Past Medical History:   Diagnosis Date    Anxiety     Morbid obesity with BMI of 50 0-59 9, adult ([facility] [address] )     Urinary tract infection      Past Surgical History:   Procedure Laterality Date     SECTION   and     NE LAP GASTRIC BYPASS/CECILE-EN-Y N/A 2022    Procedure: LAPAROSCOPIC CECILE-EN-Y GASTRIC BYPASS AND INTRAOPERATIVE EGD;  Surgeon: Liya Leon MD;  Location: [facility];  Service: Bariatrics     Social History   Social History     Substance and Sexual Activity   Alcohol Use Yes    Alcohol/week: 0 0 standard drinks    Comment: I will have a drink 1-2 times a year     Social History     Substance and Sexual Activity   Drug Use Not Currently    Types: Marijuana    Comment: I smoked a little in highschool     Social History     Tobacco Use   Smoking Status Former Smoker    Packs/day: 0 25    Years: 5 00    Pack years: 1 25    Types: Cigarettes    Quit date: 6/10/2016    Years since quittin 1   Smokeless Tobacco Never Used   Tobacco Comment    I smoked on and off not 5 years straight     Family History: non-contributory    Meds/Allergies   all medications and allergies reviewed  Allergies   Allergen Reactions    Other Hives     SOME shellfish        Objective     Current Vitals:   Blood Pressure: 102/68 (22)  Pulse: 80 (22)  Temperature: 97 8 °F (36 6 °C) (22)  Temp Source: Tympanic (22)  Height: 5' 1" (154 9 cm) (22)  Weight - Scale: 99 7 kg (219 lb 12 8 oz) (22)     Invasive Devices  Report    Peripheral Intravenous Line  Duration           Peripheral IV 22 Left Antecubital 6 days                Physical Exam  Constitutional:       Appearance: Normal appearance  Cardiovascular:      Rate and Rhythm: Normal rate  Pulmonary:      Effort: Pulmonary effort is normal  No respiratory distress  Abdominal:      General: Abdomen is flat  There is no distension  Palpations: Abdomen is soft  Tenderness: There is no abdominal tenderness  Comments: Wounds clean/dry/intact without surrounding erythema   Neurological:      Mental Status: She is alert  Assessment/Plan :    Patient is presenting for the first postoperative visit, patient hospital stay was uneventful without any complications, patient is doing well, has no complaints, is taking vitamins as instructed, currently tolerating the blenderized diet, will advance to soft diet  Patient will also be meeting with our dietician today to review vitamin and mineral supplements and also go over diet and emphasize postoperative commitment and compliance  The patient was also instructed to start exercising on a regular basis  However, I recommended no heavy lifting, or weight exercises for another 2 weeks  F/U in 4 weeks  Patient was instructed to call if develops nausea, vomiting, fever or chills

## 2022-07-29 NOTE — PROGRESS NOTES
FIRST POST-OPERATIVE VISIT - BARIATRIC SURGERY  Domenico Christina 28 y o  female MRN: [id number]  Unit/Bed#:  Encounter: [id number]      HPI:  Domenico Christina is a 28 y o  female who presents for the 1st postoperative visit following a laparoscopic Fortunato-en-Y gastric bypass  Her protein and water intake are improving  Her pain is controlled           Historical Information   Past Medical History:   Diagnosis Date    Anxiety     Morbid obesity with BMI of 50 0-59 9, adult ([address] )     Urinary tract infection      Past Surgical History:   Procedure Laterality Date     SECTION   and     IN LAP GASTRIC BYPASS/FORTUNATO-EN-Y N/A 2022    Procedure: LAPAROSCOPIC FORTUNATO-EN-Y GASTRIC BYPASS AND INTRAOPERATIVE EGD;  Surgeon: Sloane Huber MD;  Location: [address] [place] [address];  Service: Bariatrics     Social History   Social History     Substance and Sexual Activity   Alcohol Use Yes    Alcohol/week: 0 0 standard drinks    Comment: I will have a drink 1-2 times a year     Social History     Substance and Sexual Activity   Drug Use Not Currently    Types: Marijuana    Comment: I smoked a little in highschool     Social History     Tobacco Use   Smoking Status Former Smoker    Packs/day: 0 25    Years: 5 00    Pack years: 1 25    Types: Cigarettes    Quit date: 6/10/2016    Years since quittin 1   Smokeless Tobacco Never Used   Tobacco Comment    I smoked on and off not 5 years straight     Family History: non-contributory    Meds/Allergies   all medications and allergies reviewed  Allergies   Allergen Reactions    Other Hives     SOME shellfish        Objective     Current Vitals:   Blood Pressure: 102/68 (22)  Pulse: 80 (22)  Temperature: 97 8 °F (36 6 °C) (22)  Temp Source: Tympanic (22)  Height: 5' 1" (154 9 cm) (22)  Weight - Scale: 99 7 kg (219 lb 12 8 oz) (22)     Invasive Devices  Report    Peripheral Intravenous Line  Duration Peripheral IV 07/22/22 Left Antecubital 6 days                Physical Exam  Constitutional:       Appearance: Normal appearance  Cardiovascular:      Rate and Rhythm: Normal rate  Pulmonary:      Effort: Pulmonary effort is normal  No respiratory distress  Abdominal:      General: Abdomen is flat  There is no distension  Palpations: Abdomen is soft  Tenderness: There is no abdominal tenderness  Comments: Wounds clean/dry/intact without surrounding erythema   Neurological:      Mental Status: She is alert  Assessment/Plan :    Patient is presenting for the first postoperative visit, patient hospital stay was uneventful without any complications, patient is doing well, has no complaints, is taking vitamins as instructed, currently tolerating the blenderized diet, will advance to soft diet  Patient will also be meeting with our dietician today to review vitamin and mineral supplements and also go over diet and emphasize postoperative commitment and compliance  The patient was also instructed to start exercising on a regular basis  However, I recommended no heavy lifting, or weight exercises for another 2 weeks  F/U in 4 weeks  Patient was instructed to call if develops nausea, vomiting, fever or chills

## 2022-08-22 ENCOUNTER — CLINICAL SUPPORT (OUTPATIENT)
Dept: BARIATRICS | Facility: CLINIC | Age: 27
End: 2022-08-22

## 2022-08-22 DIAGNOSIS — K91.2 POSTSURGICAL MALABSORPTION: Primary | ICD-10-CM

## 2022-08-22 PROCEDURE — RECHECK

## 2022-08-22 NOTE — PROGRESS NOTES
Weight Management Nutrition Class     Diagnosis: Obesity    Bariatric Surgeon: Dr Sae Dickinson    Surgery: Gastric Bypass Laparoscopic    Class: 5 week post op     Topics discussed today include:     fluid goals post op, protein goals post op, constipation, chew food well, exercise, avoidance of alcohol, PPI use, diet progression, hypoglycemia, dumping syndrome, protein supplems, vitamin/mineral supplements, calcium supplements, additional vitamin B12 and iron supplements    Patient was able to verbalize basic diet (protein, fluid, vitamin and mineral) recommendations and possible nutrition-related complications   Yes

## 2022-10-11 LAB
FERRITIN SERPL-MCNC: 38 NG/ML (ref 15–150)
IRON SATN MFR SERPL: 13 % (ref 15–55)
IRON SERPL-MCNC: 35 UG/DL (ref 27–159)
TIBC SERPL-MCNC: 260 UG/DL (ref 250–450)
UIBC SERPL-MCNC: 225 UG/DL (ref 131–425)

## 2022-10-14 LAB
25(OH)D3+25(OH)D2 SERPL-MCNC: 56.3 NG/ML (ref 30–100)
ALBUMIN SERPL-MCNC: 4.2 G/DL (ref 3.9–5)
ALBUMIN/GLOB SERPL: 1.8 {RATIO} (ref 1.2–2.2)
ALP SERPL-CCNC: 63 IU/L (ref 44–121)
ALT SERPL-CCNC: 14 IU/L (ref 0–32)
AST SERPL-CCNC: 11 IU/L (ref 0–40)
BILIRUB SERPL-MCNC: 0.3 MG/DL (ref 0–1.2)
BUN SERPL-MCNC: 9 MG/DL (ref 6–20)
BUN/CREAT SERPL: 13 (ref 9–23)
CALCIUM SERPL-MCNC: 9.2 MG/DL (ref 8.7–10.2)
CHLORIDE SERPL-SCNC: 105 MMOL/L (ref 96–106)
CHOLEST SERPL-MCNC: 111 MG/DL (ref 100–199)
CHOLEST/HDLC SERPL: 2.5 RATIO (ref 0–4.4)
CO2 SERPL-SCNC: 23 MMOL/L (ref 20–29)
CREAT SERPL-MCNC: 0.72 MG/DL (ref 0.57–1)
EGFR: 117 ML/MIN/1.73
ERYTHROCYTE [DISTWIDTH] IN BLOOD BY AUTOMATED COUNT: 13.1 % (ref 11.7–15.4)
FOLATE SERPL-MCNC: 17.5 NG/ML
GLOBULIN SER-MCNC: 2.3 G/DL (ref 1.5–4.5)
GLUCOSE SERPL-MCNC: 86 MG/DL (ref 70–99)
HCT VFR BLD AUTO: 38.2 % (ref 34–46.6)
HDLC SERPL-MCNC: 45 MG/DL
HGB BLD-MCNC: 12.6 G/DL (ref 11.1–15.9)
LDLC SERPL CALC-MCNC: 54 MG/DL (ref 0–99)
MCH RBC QN AUTO: 29 PG (ref 26.6–33)
MCHC RBC AUTO-ENTMCNC: 33 G/DL (ref 31.5–35.7)
MCV RBC AUTO: 88 FL (ref 79–97)
PLATELET # BLD AUTO: 310 X10E3/UL (ref 150–450)
POTASSIUM SERPL-SCNC: 4.1 MMOL/L (ref 3.5–5.2)
PROT SERPL-MCNC: 6.5 G/DL (ref 6–8.5)
PTH-INTACT SERPL-MCNC: 26 PG/ML (ref 15–65)
RBC # BLD AUTO: 4.35 X10E6/UL (ref 3.77–5.28)
SL AMB VLDL CHOLESTEROL CALC: 12 MG/DL (ref 5–40)
SODIUM SERPL-SCNC: 142 MMOL/L (ref 134–144)
TRIGL SERPL-MCNC: 52 MG/DL (ref 0–149)
VIT A SERPL-MCNC: 26.8 UG/DL (ref 18.9–57.3)
VIT B1 BLD-SCNC: 132.2 NMOL/L (ref 66.5–200)
VIT B12 SERPL-MCNC: 679 PG/ML (ref 232–1245)
WBC # BLD AUTO: 5.3 X10E3/UL (ref 3.4–10.8)
ZINC SERPL-MCNC: 64 UG/DL (ref 44–115)

## 2022-10-17 PROBLEM — Z48.815 ENCOUNTER FOR SURGICAL AFTERCARE FOLLOWING SURGERY OF DIGESTIVE SYSTEM: Status: ACTIVE | Noted: 2022-10-17

## 2022-10-17 PROBLEM — K91.2 POSTSURGICAL MALABSORPTION: Status: ACTIVE | Noted: 2022-10-17

## 2022-10-17 NOTE — ASSESSMENT & PLAN NOTE
-Resolved  -Advised avoidance of food triggers and gastric irritants, follow anti-reflux diet and lifestyle measures  -Continue PPI for another 2 5 months and then slowly taper off

## 2022-10-17 NOTE — ASSESSMENT & PLAN NOTE
-Avoid fried foods and trans fat, limit saturated fats and refined carbohydrates  -Increase fish/omega 3 FA consumption  -Increase physical activity

## 2022-10-17 NOTE — ASSESSMENT & PLAN NOTE
-s/p Fortunato-En-Y Gastric Bypass with Dr Rodri Santillan on 07/18/22  Overall doing Well  EWL on schedule for expected post surgical weight loss at this time  Initial: 268lbs  Current: 187 8lbs  EWL: 59%  Fercho: current  Current BMI is Body mass index is 35 48 kg/m²  · Tolerating a regular diet-yes  · Eating at least 60 grams of protein per day-yes  · Following 30/60 minute rule with liquids-yes  · Drinking at least 64 ounces of fluid per day-no; advised she increase  · Drinking carbonated beverages-no  · Sufficient exercise-limited; advised she start walking program and gentle strength training  · Using NSAIDs regularly-no  · Using nicotine-no  · Using alcohol-no   Advised about the risks of alcohol s/p bariatric surgery and recommend avoiding all alcohol

## 2022-10-17 NOTE — ASSESSMENT & PLAN NOTE
-At risk for malabsorption of vitamins/minerals secondary to malabsorption and restriction of intake from bariatric surgery  -Currently taking adequate postop bariatric surgery vitamin supplementation: Bariatric Pal MVI w/ 45mg iron, calcium citrate 500mg TID    -Last set of bariatric labs completed on 10/10/22:        -Vitamins WNL  -Next set of bariatric labs ordered for approximately 3 months     -Patient received education about the importance of adhering to a lifelong supplementation regimen to avoid vitamin/mineral deficiencies

## 2022-10-20 ENCOUNTER — OFFICE VISIT (OUTPATIENT)
Dept: BARIATRICS | Facility: CLINIC | Age: 27
End: 2022-10-20
Payer: COMMERCIAL

## 2022-10-20 VITALS
WEIGHT: 187.8 LBS | DIASTOLIC BLOOD PRESSURE: 60 MMHG | HEIGHT: 61 IN | BODY MASS INDEX: 35.45 KG/M2 | HEART RATE: 84 BPM | SYSTOLIC BLOOD PRESSURE: 90 MMHG

## 2022-10-20 DIAGNOSIS — K21.9 GASTROESOPHAGEAL REFLUX DISEASE: ICD-10-CM

## 2022-10-20 DIAGNOSIS — E66.9 OBESITY, CLASS II, BMI 35-39.9: ICD-10-CM

## 2022-10-20 DIAGNOSIS — K91.2 POSTSURGICAL MALABSORPTION: ICD-10-CM

## 2022-10-20 DIAGNOSIS — E78.5 HLD (HYPERLIPIDEMIA): ICD-10-CM

## 2022-10-20 DIAGNOSIS — Z48.815 ENCOUNTER FOR SURGICAL AFTERCARE FOLLOWING SURGERY OF DIGESTIVE SYSTEM: Primary | ICD-10-CM

## 2022-10-20 PROCEDURE — 99214 OFFICE O/P EST MOD 30 MIN: CPT | Performed by: PHYSICIAN ASSISTANT

## 2022-10-20 NOTE — PROGRESS NOTES
Assessment/Plan:    Encounter for surgical aftercare following surgery of digestive system  -s/p Fortunato-En-Y Gastric Bypass with Dr Tarun Schofield on 07/18/22  Overall doing Well  EWL on schedule for expected post surgical weight loss at this time  Initial: 268lbs  Current: 187 8lbs  EWL: 59%  Fercho: current  Current BMI is Body mass index is 35 48 kg/m²  · Tolerating a regular diet-yes  · Eating at least 60 grams of protein per day-yes  · Following 30/60 minute rule with liquids-yes  · Drinking at least 64 ounces of fluid per day-no; advised she increase  · Drinking carbonated beverages-no  · Sufficient exercise-limited; advised she start walking program and gentle strength training  · Using NSAIDs regularly-no  · Using nicotine-no  · Using alcohol-no   Advised about the risks of alcohol s/p bariatric surgery and recommend avoiding all alcohol      Postsurgical malabsorption  -At risk for malabsorption of vitamins/minerals secondary to malabsorption and restriction of intake from bariatric surgery  -Currently taking adequate postop bariatric surgery vitamin supplementation: Bariatric Pal MVI w/ 45mg iron, calcium citrate 500mg TID    -Last set of bariatric labs completed on 10/10/22:        -Vitamins WNL  -Next set of bariatric labs ordered for approximately 3 months     -Patient received education about the importance of adhering to a lifelong supplementation regimen to avoid vitamin/mineral deficiencies       Gastroesophageal reflux disease  -Resolved  -Advised avoidance of food triggers and gastric irritants, follow anti-reflux diet and lifestyle measures  -Continue PPI for another 2 5 months and then slowly taper off      HLD (hyperlipidemia)  -Avoid fried foods and trans fat, limit saturated fats and refined carbohydrates  -Increase fish/omega 3 FA consumption  -Increase physical activity         Diagnoses and all orders for this visit:    Encounter for surgical aftercare following surgery of digestive system    Postsurgical malabsorption  -     CBC and differential; Future  -     Comprehensive metabolic panel; Future  -     Folate; Future  -     Hemoglobin A1C; Future  -     Iron Panel (Includes Ferritin, Iron Sat%, Iron, and TIBC); Future  -     Zinc; Future  -     Vitamin D 25 hydroxy; Future  -     Vitamin B12; Future  -     Vitamin B1, whole blood; Future  -     Vitamin A; Future  -     PTH, intact; Future  -     Lipid panel; Future  -     CBC and differential  -     Comprehensive metabolic panel  -     Folate  -     Hemoglobin A1C  -     Zinc  -     Vitamin D 25 hydroxy  -     Vitamin B12  -     Vitamin B1, whole blood  -     Vitamin A  -     PTH, intact  -     Lipid panel    Gastroesophageal reflux disease    HLD (hyperlipidemia)  -     Lipid panel; Future  -     Lipid panel    Obesity, Class II, BMI 35-39 9  -     Hemoglobin A1C; Future  -     Lipid panel; Future  -     Hemoglobin A1C  -     Lipid panel          Subjective:      Patient ID: Gregory Calderón is a 28 y o  female  -s/p Fortunato-En-Y Gastric Bypass with Dr Jamaica Taylor on 07/18/22    Presents to the office today for routine follow up  Tolerating diet without issues; denies N/V, dysphagia, reflux  Overall doing very well  No reflux, taking PPI daily  Has BM every other day  Limited fluids  Has intermittent rashes/skin irritation in intertrigous areas  Diet Recall:   B - Greek yogurt or protein shake  L - leftovers or low CHO/high protein wrap  Snack - balance break cheese and nuts  D - lean protein and veggies sometimes green salad    Fluids - 32oz crystal lite, sometimes protein shake    The following portions of the patient's history were reviewed and updated as appropriate: allergies, current medications, past family history, past medical history, past social history, past surgical history and problem list     Review of Systems   Constitutional: Negative for chills and fever  Unexpected weight change: planned weight loss     HENT: Negative for trouble swallowing  Respiratory: Negative for cough and shortness of breath  Cardiovascular: Negative for chest pain and palpitations  Gastrointestinal: Positive for constipation  Negative for abdominal pain, diarrhea, nausea and vomiting  Skin: Positive for rash  Neurological: Negative for dizziness  Psychiatric/Behavioral: The patient is nervous/anxious  Objective:      BP 90/60 (BP Location: Right arm, Patient Position: Sitting, Cuff Size: Large)   Pulse 84   Ht 5' 1" (1 549 m)   Wt 85 2 kg (187 lb 12 8 oz)   LMP 10/10/2022   BMI 35 48 kg/m²     Colonoscopy-Not applicable       Physical Exam  Vitals reviewed  Constitutional:       General: She is not in acute distress  Appearance: She is well-developed  HENT:      Head: Normocephalic and atraumatic  Eyes:      General: No scleral icterus  Cardiovascular:      Rate and Rhythm: Normal rate and regular rhythm  Pulmonary:      Effort: Pulmonary effort is normal  No respiratory distress  Abdominal:      General: There is no distension  Palpations: Abdomen is soft  Tenderness: There is no abdominal tenderness  Comments: No incisional hernias appreciated   Skin:     General: Skin is warm and dry  Neurological:      Mental Status: She is alert and oriented to person, place, and time  Psychiatric:         Mood and Affect: Mood normal          Behavior: Behavior normal            BARRIERS: none identified    GOALS:   · Continued/Maintain healthy weight loss with good nutrition intakes  · Adequate hydration with at least 64oz  fluid intake  · Normal vitamin and mineral levels  · Exercise as tolerated  · Follow-up in 3 months  We kindly ask that your arrive 15 minutes before your scheduled appointment time with your provider to allow our staff to room you, get your vital signs and update your chart  · Follow diet as discussed  · Get lab work done in 3 months  You have been given a lab slip today  Please call the office if you need a replacement  It is recommended to check with your insurance BEFORE getting labs done to make sure they are covered by your policy  Also, please check with your PCP and other providers before getting labs to avoid duplicate labs  Make sure to HOLD any multivitamins that may contain biotin and any biotin supplements FOR 5 DAYS before any labs since it can affect the results  · Follow vitamin and mineral recommendations as reviewed with you  · Call our office if you have any problems with abdominal pain especially associated with fever, chills, nausea, vomiting or any other concerns  · All  Post-bariatric surgery patients should be aware that very small quantities of any alcohol can cause impairment and it is very possible not to feel the effect  The effect can be in the system for several hours  It is also a stomach irritant  · It is advised to AVOID alcohol, Nonsteroidal antiinflammatory drugs (NSAIDS) and nicotine of all forms   Any of these can cause stomach irritation/pain

## 2023-01-05 ENCOUNTER — TELEPHONE (OUTPATIENT)
Dept: FAMILY MEDICINE CLINIC | Facility: CLINIC | Age: 28
End: 2023-01-05

## 2023-01-05 ENCOUNTER — OFFICE VISIT (OUTPATIENT)
Dept: FAMILY MEDICINE CLINIC | Facility: CLINIC | Age: 28
End: 2023-01-05

## 2023-01-05 VITALS
OXYGEN SATURATION: 99 % | HEIGHT: 60 IN | BODY MASS INDEX: 35.95 KG/M2 | TEMPERATURE: 97.3 F | WEIGHT: 183.1 LBS | DIASTOLIC BLOOD PRESSURE: 60 MMHG | HEART RATE: 72 BPM | SYSTOLIC BLOOD PRESSURE: 110 MMHG

## 2023-01-05 DIAGNOSIS — N94.89 MENSTRUATION, SUPPRESSION: ICD-10-CM

## 2023-01-05 DIAGNOSIS — N92.6: ICD-10-CM

## 2023-01-05 DIAGNOSIS — Z30.09 ENCOUNTER FOR COUNSELING REGARDING CONTRACEPTION: Primary | ICD-10-CM

## 2023-01-05 RX ORDER — LEVONORGESTREL / ETHINYL ESTRADIOL AND ETHINYL ESTRADIOL 150-30(84)
1 KIT ORAL DAILY
Qty: 91 TABLET | Refills: 0 | Status: SHIPPED | OUTPATIENT
Start: 2023-01-05 | End: 2023-01-06

## 2023-01-05 NOTE — PROGRESS NOTES
[facility] Office visit    Assessment/Plan:     1  Encounter for counseling regarding contraception    2  Menstruation, suppression  -     Levonorgest-Eth Estrad 91-Day (Seasonique) 0 15-0 03 &0 01 MG TABS; Take 1 tablet by mouth in the morning or equivalent covered by insurance      -Patient cannot have nexplanon placed because it is used as contraceptive device not for menstruation suppression  Also discussed side effect of vaginal bleeding associated with nexplanon    -Case discussed with Dr Candace Inman    Return in 3 months for annual and refill        Subjective:   HPI  Leanne Hercules is a 28 y o  female who presents to discuss supression of menstruation  She was interested in getting nexplanon  Notes friend has this and she is not getting her periods  Patient is sexually active with females only  Review of Systems   Constitutional: Negative for activity change, appetite change and chills  HENT: Negative for congestion  Respiratory: Negative for cough and shortness of breath  Cardiovascular: Negative for chest pain  Gastrointestinal: Negative for abdominal pain, constipation, diarrhea, nausea and vomiting  Genitourinary: Positive for menstrual problem  Musculoskeletal: Negative for myalgias  Skin: Negative for rash  Neurological: Negative for weakness, light-headedness and headaches  Psychiatric/Behavioral: The patient is not nervous/anxious  Objective:     /60 (BP Location: Left arm, Patient Position: Sitting, Cuff Size: Large)   Pulse 72   Temp (!) 97 3 °F (36 3 °C) (Tympanic)   Ht 5' (1 524 m)   Wt 83 1 kg (183 lb 1 6 oz)   SpO2 99%   BMI 35 76 kg/m²      Physical Exam  Constitutional:       Appearance: Normal appearance  HENT:      Head: Atraumatic  Cardiovascular:      Rate and Rhythm: Normal rate and regular rhythm  Pulses: Normal pulses  Heart sounds: Normal heart sounds     Pulmonary:      Effort: Pulmonary effort is normal       Breath sounds: Normal breath sounds  Abdominal:      General: Bowel sounds are normal  There is no distension  Tenderness: There is no abdominal tenderness  Skin:     General: Skin is warm  Neurological:      General: No focal deficit present  Mental Status: She is alert and oriented to person, place, and time  Psychiatric:         Mood and Affect: Mood normal          Behavior: Behavior normal          Thought Content:  Thought content normal          Judgment: Judgment normal           ** Please Note: This note has been constructed using a voice recognition system **     Bettina Moraes MD  01/05/23  4:09 PM

## 2023-01-05 NOTE — TELEPHONE ENCOUNTER
Patient calling in and stating that Birth Control ordered is not covered under insurance and if something else could be sent

## 2023-01-06 ENCOUNTER — TELEPHONE (OUTPATIENT)
Dept: FAMILY MEDICINE CLINIC | Facility: CLINIC | Age: 28
End: 2023-01-06

## 2023-01-06 DIAGNOSIS — N94.89 MENSTRUATION, SUPPRESSION: Primary | ICD-10-CM

## 2023-01-06 RX ORDER — ETHYNODIOL DIACETATE AND ETHINYL ESTRADIOL 1 MG-35MCG
1 KIT ORAL DAILY
Qty: 90 TABLET | Refills: 0 | Status: SHIPPED | OUTPATIENT
Start: 2023-01-06 | End: 2023-02-21 | Stop reason: SDUPTHER

## 2023-01-06 NOTE — TELEPHONE ENCOUNTER
Patient needs to ask pharmacy what medication is covered and then I can proceed to prescribe medication that is covered under her insurance   Discussed with Dr Francesca Baig

## 2023-01-23 ENCOUNTER — TELEPHONE (OUTPATIENT)
Dept: BARIATRICS | Facility: CLINIC | Age: 28
End: 2023-01-23

## 2023-01-23 NOTE — ASSESSMENT & PLAN NOTE
-s/p Fortunato-En-Y Gastric Bypass with Dr Ny Barroso on 07/18/22  Overall doing Well  EWL on schedule for expected post surgical weight loss at this time  Recommend dietary changes - avoid excess protein bars, artificial sweeteners/sugar alcohols, increase water intake, increase protein and fiber at meals and continue f/u with RD  Initial: 268lbs  Current: 187 8lbs  EWL: 59%  Fercho: current  Current BMI is There is no height or weight on file to calculate BMI  · Tolerating a regular diet-yes  · Eating at least 60 grams of protein per day-yes  · Following 30/60 minute rule with liquids-yes  · Drinking at least 64 ounces of fluid per day-no; advised she increase  · Drinking carbonated beverages-no  · Sufficient exercise-limited; advised she start walking program and gentle strength training  · Using NSAIDs regularly-no  · Using nicotine-no  · Using alcohol-no   Advised about the risks of alcohol s/p bariatric surgery and recommend avoiding all alcohol

## 2023-01-23 NOTE — ASSESSMENT & PLAN NOTE
-At risk for malabsorption of vitamins/minerals secondary to malabsorption and restriction of intake from bariatric surgery  -Currently taking adequate postop bariatric surgery vitamin supplementation: Bariatric Pal MVI w/ 45mg iron, calcium citrate 500mg TID    -Last set of bariatric labs completed on 10/10/22:        -Vitamins WNL  -Next set of bariatric labs ordered     -Patient received education about the importance of adhering to a lifelong supplementation regimen to avoid vitamin/mineral deficiencies

## 2023-01-27 ENCOUNTER — TELEPHONE (OUTPATIENT)
Dept: BARIATRICS | Facility: CLINIC | Age: 28
End: 2023-01-27

## 2023-01-27 NOTE — TELEPHONE ENCOUNTER
LVM for pt - we moved her 6 mth post op check with LOS Barrett to 3:30 on 2/1 after see she's Sanjuana Bustamante, asked pt to conf that this was ok

## 2023-02-01 ENCOUNTER — OFFICE VISIT (OUTPATIENT)
Dept: BARIATRICS | Facility: CLINIC | Age: 28
End: 2023-02-01

## 2023-02-01 VITALS
DIASTOLIC BLOOD PRESSURE: 64 MMHG | WEIGHT: 179.8 LBS | BODY MASS INDEX: 33.95 KG/M2 | HEIGHT: 61 IN | SYSTOLIC BLOOD PRESSURE: 102 MMHG | HEART RATE: 72 BPM

## 2023-02-01 VITALS — WEIGHT: 179.8 LBS | BODY MASS INDEX: 33.95 KG/M2 | HEIGHT: 61 IN

## 2023-02-01 DIAGNOSIS — E66.9 OBESITY, CLASS I, BMI 30-34.9: ICD-10-CM

## 2023-02-01 DIAGNOSIS — Z48.815 ENCOUNTER FOR SURGICAL AFTERCARE FOLLOWING SURGERY OF DIGESTIVE SYSTEM: Primary | ICD-10-CM

## 2023-02-01 DIAGNOSIS — K91.2 POSTSURGICAL MALABSORPTION: ICD-10-CM

## 2023-02-01 DIAGNOSIS — K91.2 POSTSURGICAL MALABSORPTION: Primary | ICD-10-CM

## 2023-02-01 DIAGNOSIS — K21.9 GASTROESOPHAGEAL REFLUX DISEASE, UNSPECIFIED WHETHER ESOPHAGITIS PRESENT: ICD-10-CM

## 2023-02-01 DIAGNOSIS — E78.5 HYPERLIPIDEMIA, UNSPECIFIED HYPERLIPIDEMIA TYPE: ICD-10-CM

## 2023-02-01 NOTE — PROGRESS NOTES
Bariatric Follow Up Nutrition Note    Type of surgery  Gastric bypass: laparoscopic  Surgery Date: 22  6 months  post-op  Surgeon: Dr Aure Mooney  28 y o   female  Height 5' 1" (1 549 m), weight 81 6 kg (179 lb 12 8 oz), last menstrual period 2023  Body mass index is 33 97 kg/m²      Initial:  268#  Weight on Day of Weight Loss Surgery: 219 8#  Weight in (lb) to have BMI = 25: 132 4#  Pre-Op Excess Wt: 135 6#  Post-Op Wt Loss: 88 2#/ 65% EBWL in 6 month(s)    Review of History and Medications   Past Medical History:   Diagnosis Date   • Anxiety    • Morbid obesity with BMI of 50 0-59 9, adult ([facility] [address] )    • Urinary tract infection      Past Surgical History:   Procedure Laterality Date   •  SECTION   and    • PA LAPS GSTR RSTCV PX W/BYP CECILE-EN-Y LIMB <150 CM N/A 2022    Procedure: LAPAROSCOPIC CECILE-EN-Y GASTRIC BYPASS AND INTRAOPERATIVE EGD;  Surgeon: Merlyn Mota MD;  Location: [facility];  Service: Bariatrics     Social History     Socioeconomic History   • Marital status: Single     Spouse name: Not on file   • Number of children: Not on file   • Years of education: Not on file   • Highest education level: Not on file   Occupational History   • Not on file   Tobacco Use   • Smoking status: Former     Packs/day: 0 25     Years: 5 00     Pack years: 1 25     Types: Cigarettes     Quit date: 6/10/2016     Years since quittin 6   • Smokeless tobacco: Never   • Tobacco comments:     I smoked on and off not 5 years straight   Vaping Use   • Vaping Use: Never used   Substance and Sexual Activity   • Alcohol use: Not Currently     Comment: I will have a drink 1-2 times a year   • Drug use: Not Currently     Types: Marijuana     Comment: I smoked a little in highschool   • Sexual activity: Yes     Partners: Female   Other Topics Concern   • Not on file   Social History Narrative    Lives with 2 daughters, best friend, best friend's  Social Determinants of Health     Financial Resource Strain: Not on file   Food Insecurity: Not on file   Transportation Needs: Not on file   Physical Activity: Not on file   Stress: Not on file   Social Connections: Not on file   Intimate Partner Violence: Not on file   Housing Stability: Not on file       Current Outpatient Medications:   •  Calcium 500-100 MG-UNIT CHEW, Chew, Disp: , Rfl:   •  ethynodiol-ethinyl estradiol (Avelino Skinner 1/35, 28,) 1-35 MG-MCG per tablet, Take 1 tablet by mouth daily, Disp: 90 tablet, Rfl: 0  •  Multiple Vitamin (multivitamin) tablet, Take 1 tablet by mouth daily, Disp: , Rfl:     Food Intake and Lifestyle Assessment   Food Intake Assessment completed via usual diet recall  Wake:  6:30am, has to leave for work at [employer]: 8am-sometimes eggs OR yogurt OR protein bar (Premier--200 OR Pure protein (200 cals, 20gm protein) Gatorade) OR just coffee w/stevia and almond milk  Snack: 10am-starts getting shaky--might grab a protein bar or some kind of candy depending on how "bad" she is feeling   Lunch: 12pm-Protein bar OR turkey and cheese on a low carb wrap OR turkey, cheese and crackers OR in the past balance breaks  Snack: leaves work at [id number]   At this time will have rest of lunch OR a snack (cookies or string cheese or yogurt)  Dinner: 6pm-Frozen meal most often  Snack: tries not to  *max 2 protein bars per day    Beverage intake: water and sugar free beverages  Diet texture/stage: regular  Protein supplement: none at this time  Estimated protein intake per day: 50-70gm  Estimated fluid intake per day: 48-62oz  Meals eaten away from home: not often  Typical meal pattern: 3 meals per day and 2-3 snacks per day  Eating Behaviors: Appropriate diet advancement, Appropriate portion sizes and Does not drink with meals and waits 30-minutes after meal before resuming drinking    Food allergies or intolerances: none  Cultural or Restorationist considerations: -    Physical Assessment  Nutrition Related Findings  Diarrhea/soft BM-every day (maybe from excess sugar substitutes/sugar alcohols)  Lightheadedness likely due to Hypoglycemia     Physical Activity  Types of exercise: plan is to do the [place] 5 K in [place] then end of the month--most likely walking it  Current physical limitations: -    Psychosocial Assessment   Support systems: friend(s) relative(s)  Socioeconomic factors: none    Nutrition Diagnosis  Diagnosis: Inappropriate intake of carbohydrates (NI-5 8 3) and Disordered eating pattern (NB-1 5)  Related to: Altered GI function  As Evidenced by: shaky and lightheaded likely due to low blood sugar     Interventions and Teaching   Patient educated on post-op nutrition guidelines  Patient educated and handouts provided  Surgical changes to stomach / GI  Capacity of post-surgery stomach  Adequate hydration  Expected weight loss  Weight loss plateaus/ possibility of weight regain  Exercise  Nutrition considerations after surgery  Protein supplements  Meal planning and preparation  Appropriate carbohydrate, protein, and fat intake, and food/fluid choices to maximize safe weight loss, nutrient intake, and tolerance   Dietary and lifestyle changes  Possible problems with poor eating habits  Intuitive eating  Techniques for self monitoring and keeping daily food journal  Potential for food intolerance after surgery, and ways to deal with them including: lactose intolerance, nausea, reflux, vomiting, diarrhea, food intolerance, appetite changes, gas    Education provided to: patient    Barriers to learning: No barriers identified    Readiness to change: preparation    Comprehension: verbalizes understanding     Expected Compliance: good    Pt presents today  for 6 month f/u s/p RYGB  Pt has had excellent weight loss and is on track with 65% weight loss  Pt has been experiencing some shakiness about 2hrs after eating likely related to reactive hypoglycemia    She reports a lot of stress in her life right now which may be causing some depression, as she explained very little desire/motivation to think about what to eat  She appears to be relying on protein bars for 2 meals/snacks per day, frozen meal very often for dinner and will grab candy when she feels her sugars are low  Explained to pt the need to eat every 2hrs and ensure it has a combo of carbs and protein, never just a carb, to help maintain her blood sugars better  Suggested the excess sugar alcohols of the protein bars + the candy at times may be contributing to the diarrhea  Also, discussed would also benefit from increasing her fluid intake  Set up a meal/snack schedule for pt and provided with a list of healthy snacks to choose from  Suggested to keep a log of food and symptoms to better assess  With all of pt's stress right now asked if pt is connected with a therapist  She states she has her intake visit on 2/14  Did offer pt to also meet with NAVJOT Smith which she agreed to and scheduled for 2/6/23      Goals  Food journal via baritastic  Exercise 30 minutes 5 times per week--start with walking  Eat 3 meals per day + 3 snacks to prevent hypoglycemia  Eat every 2hrs with a combo of complex carb + protein  Provided with list of snacks to choose from  Meet with NAVJOT on 2/6  F/U with RD on 2/21     Time Spent:   30 Minutes

## 2023-02-01 NOTE — PROGRESS NOTES
Assessment/Plan:    Encounter for surgical aftercare following surgery of digestive system  -s/p Fortunato-En-Y Gastric Bypass with Dr Anitra Sam on 07/18/22  Overall doing Well  EWL on schedule for expected post surgical weight loss at this time  Recommend dietary changes - avoid excess protein bars, artificial sweeteners/sugar alcohols, increase water intake, increase protein and fiber at meals and continue f/u with RD  Initial: 268lbs  Current: 187 8lbs  EWL: 59%  Fercho: current  Current BMI is There is no height or weight on file to calculate BMI  · Tolerating a regular diet-yes  · Eating at least 60 grams of protein per day-yes  · Following 30/60 minute rule with liquids-yes  · Drinking at least 64 ounces of fluid per day-no; advised she increase  · Drinking carbonated beverages-no  · Sufficient exercise-limited; advised she start walking program and gentle strength training  · Using NSAIDs regularly-no  · Using nicotine-no  · Using alcohol-no   Advised about the risks of alcohol s/p bariatric surgery and recommend avoiding all alcohol      Postsurgical malabsorption  -At risk for malabsorption of vitamins/minerals secondary to malabsorption and restriction of intake from bariatric surgery  -Currently taking adequate postop bariatric surgery vitamin supplementation: Bariatric Pal MVI w/ 45mg iron, calcium citrate 500mg TID    -Last set of bariatric labs completed on 10/10/22:        -Vitamins WNL  -Next set of bariatric labs ordered     -Patient received education about the importance of adhering to a lifelong supplementation regimen to avoid vitamin/mineral deficiencies     Gastroesophageal reflux disease  -Advised avoidance of food triggers and gastric irritants, follow anti-reflux diet and lifestyle measures  -Attempt to slowly wean/taper off PPI and bridge to pepcid, gaviscon prn  -Notify me if issues      HLD (hyperlipidemia)  -Avoid fried foods and trans fat, limit saturated fats and refined carbohydrates  -Increase fish/omega 3 FA consumption  -Increase physical activity         Diagnoses and all orders for this visit:    Encounter for surgical aftercare following surgery of digestive system    Postsurgical malabsorption  -     CBC and differential; Future  -     Comprehensive metabolic panel; Future  -     Folate; Future  -     Hemoglobin A1C; Future  -     Iron Panel (Includes Ferritin, Iron Sat%, Iron, and TIBC); Future  -     Zinc; Future  -     Vitamin D 25 hydroxy; Future  -     Vitamin B12; Future  -     Vitamin B1, whole blood; Future  -     Vitamin A; Future  -     PTH, intact; Future  -     Lipid panel; Future  -     CBC and differential  -     Comprehensive metabolic panel  -     Folate  -     Hemoglobin A1C  -     Zinc  -     Vitamin D 25 hydroxy  -     Vitamin B12  -     Vitamin B1, whole blood  -     Vitamin A  -     PTH, intact  -     Lipid panel    Gastroesophageal reflux disease, unspecified whether esophagitis present    Hyperlipidemia, unspecified hyperlipidemia type  -     Lipid panel; Future  -     Lipid panel    Obesity, Class I, BMI 30-34 9  -     Hemoglobin A1C; Future  -     Lipid panel; Future  -     Hemoglobin A1C  -     Lipid panel          Subjective:      Patient ID: Aure Brooks is a 28 y o  female  -s/p Fortunato-En-Y Gastric Bypass with Dr Bri Hayden on 07/18/22  Presents to the office today for routine follow up  Tolerating diet without issues; denies N/V, dysphagia, reflux  High stress with her 8year old - he is transitioning and recently hospitalized for SI and struggling with unsupportive father  Has loose stool daily that started recently  Does not like to cook  Often eats protein bars  Feels dizzy at times around 10am and treats with candy  Admits to mindless emotional eating  Dizzy if stands too fast   Has upcoming Psych evaluation  Plans to do 5K with girlfriend at [facility] morris      Diet Recall:   8am - protein bar (premier) or light and fit [place] yogurt  10am - feels weak and shaky and feels better if eats skittles or starburst (happens most work days) or another protein bar  12 L - leftovers or turkey on low CHO wrap or turkey and cheese and triscuits  Snack - protein bar or cookies  D - low CHO freezer meals  HS - chocolate and candy    Fluids - 48-64oz water, sometimes coffee      The following portions of the patient's history were reviewed and updated as appropriate: allergies, current medications, past family history, past medical history, past social history, past surgical history and problem list     Review of Systems   Constitutional: Negative for chills and fever  Unexpected weight change: planned weight loss  HENT: Negative for trouble swallowing  Respiratory: Negative for cough and shortness of breath  Cardiovascular: Negative for chest pain and palpitations  Gastrointestinal: Positive for diarrhea  Negative for abdominal pain, constipation, nausea and vomiting  Neurological: Negative for dizziness  Psychiatric/Behavioral: The patient is nervous/anxious  Objective:      /64 (BP Location: Right arm, Patient Position: Sitting, Cuff Size: Standard)   Pulse 72   Ht 5' 1" (1 549 m)   Wt 81 6 kg (179 lb 12 8 oz)   LMP 01/13/2023 (Exact Date)   BMI 33 97 kg/m²     Colonoscopy-Not applicable       Physical Exam  Vitals reviewed  Constitutional:       General: She is not in acute distress  Appearance: She is well-developed  HENT:      Head: Normocephalic and atraumatic  Eyes:      General: No scleral icterus  Cardiovascular:      Rate and Rhythm: Normal rate and regular rhythm  Pulmonary:      Effort: Pulmonary effort is normal  No respiratory distress  Abdominal:      General: There is no distension  Palpations: Abdomen is soft  Tenderness: There is no abdominal tenderness  Skin:     General: Skin is warm and dry  Neurological:      Mental Status: She is alert and oriented to person, place, and time  Psychiatric:         Mood and Affect: Mood normal          Behavior: Behavior normal            BARRIERS: none identified    GOALS:   · Continued/Maintain healthy weight loss with good nutrition intakes  · Adequate hydration with at least 64oz  fluid intake  · Normal vitamin and mineral levels  · Exercise as tolerated  · Avoid artificial sweeteners and sugar alcohols, limit protein bars to 1x/day  · Follow up with RD    · Follow-up in 6 months  We kindly ask that your arrive 15 minutes before your scheduled appointment time with your provider to allow our staff to room you, get your vital signs and update your chart  · Follow diet as discussed  · Get lab work done in 6 months  You have been given a lab slip today  Please call the office if you need a replacement  It is recommended to check with your insurance BEFORE getting labs done to make sure they are covered by your policy  Also, please check with your PCP and other providers before getting labs to avoid duplicate labs  Make sure to HOLD any multivitamins that may contain biotin and any biotin supplements FOR 5 DAYS before any labs since it can affect the results  · Follow vitamin and mineral recommendations as reviewed with you  · Call our office if you have any problems with abdominal pain especially associated with fever, chills, nausea, vomiting or any other concerns  · All  Post-bariatric surgery patients should be aware that very small quantities of any alcohol can cause impairment and it is very possible not to feel the effect  The effect can be in the system for several hours  It is also a stomach irritant  · It is advised to AVOID alcohol, Nonsteroidal antiinflammatory drugs (NSAIDS) and nicotine of all forms   Any of these can cause stomach irritation/pain

## 2023-02-01 NOTE — PATIENT INSTRUCTIONS
GOALS:   Continued/Maintain healthy weight loss with good nutrition intakes  Adequate hydration with at least 64oz  fluid intake  Normal vitamin and mineral levels  Exercise as tolerated  Avoid artificial sweeteners and sugar alcohols, limit protein bars to 1x/day  Follow up with RD    Follow-up in 6 months  We kindly ask that your arrive 15 minutes before your scheduled appointment time with your provider to allow our staff to room you, get your vital signs and update your chart  Follow diet as discussed  Get lab work done in 6 months  You have been given a lab slip today  Please call the office if you need a replacement  It is recommended to check with your insurance BEFORE getting labs done to make sure they are covered by your policy  Also, please check with your PCP and other providers before getting labs to avoid duplicate labs  Make sure to HOLD any multivitamins that may contain biotin and any biotin supplements FOR 5 DAYS before any labs since it can affect the results  Follow vitamin and mineral recommendations as reviewed with you  Call our office if you have any problems with abdominal pain especially associated with fever, chills, nausea, vomiting or any other concerns  All  Post-bariatric surgery patients should be aware that very small quantities of any alcohol can cause impairment and it is very possible not to feel the effect  The effect can be in the system for several hours  It is also a stomach irritant  It is advised to AVOID alcohol, Nonsteroidal antiinflammatory drugs (NSAIDS) and nicotine of all forms   Any of these can cause stomach irritation/pain

## 2023-02-08 LAB
25(OH)D3+25(OH)D2 SERPL-MCNC: 52.9 NG/ML (ref 30–100)
ALBUMIN SERPL-MCNC: 3.9 G/DL (ref 3.9–5)
ALBUMIN/GLOB SERPL: 1.7 {RATIO} (ref 1.2–2.2)
ALP SERPL-CCNC: 53 IU/L (ref 44–121)
ALT SERPL-CCNC: 25 IU/L (ref 0–32)
AST SERPL-CCNC: 19 IU/L (ref 0–40)
BASOPHILS # BLD AUTO: 0.1 X10E3/UL (ref 0–0.2)
BASOPHILS NFR BLD AUTO: 1 %
BILIRUB SERPL-MCNC: 0.3 MG/DL (ref 0–1.2)
BUN SERPL-MCNC: 14 MG/DL (ref 6–20)
BUN/CREAT SERPL: 18 (ref 9–23)
CALCIUM SERPL-MCNC: 9.2 MG/DL (ref 8.7–10.2)
CHLORIDE SERPL-SCNC: 105 MMOL/L (ref 96–106)
CHOLEST SERPL-MCNC: 150 MG/DL (ref 100–199)
CHOLEST/HDLC SERPL: 2.6 RATIO (ref 0–4.4)
CO2 SERPL-SCNC: 24 MMOL/L (ref 20–29)
CREAT SERPL-MCNC: 0.79 MG/DL (ref 0.57–1)
EGFR: 104 ML/MIN/1.73
EOSINOPHIL # BLD AUTO: 0.1 X10E3/UL (ref 0–0.4)
EOSINOPHIL NFR BLD AUTO: 2 %
ERYTHROCYTE [DISTWIDTH] IN BLOOD BY AUTOMATED COUNT: 13.1 % (ref 11.7–15.4)
EST. AVERAGE GLUCOSE BLD GHB EST-MCNC: 100 MG/DL
FOLATE SERPL-MCNC: >20 NG/ML
GLOBULIN SER-MCNC: 2.3 G/DL (ref 1.5–4.5)
GLUCOSE SERPL-MCNC: 75 MG/DL (ref 70–99)
HBA1C MFR BLD: 5.1 % (ref 4.8–5.6)
HCT VFR BLD AUTO: 37.2 % (ref 34–46.6)
HDLC SERPL-MCNC: 58 MG/DL
HGB BLD-MCNC: 12.5 G/DL (ref 11.1–15.9)
IMM GRANULOCYTES # BLD: 0 X10E3/UL (ref 0–0.1)
IMM GRANULOCYTES NFR BLD: 0 %
LDLC SERPL CALC-MCNC: 76 MG/DL (ref 0–99)
LYMPHOCYTES # BLD AUTO: 1.6 X10E3/UL (ref 0.7–3.1)
LYMPHOCYTES NFR BLD AUTO: 29 %
MCH RBC QN AUTO: 29.7 PG (ref 26.6–33)
MCHC RBC AUTO-ENTMCNC: 33.6 G/DL (ref 31.5–35.7)
MCV RBC AUTO: 88 FL (ref 79–97)
MONOCYTES # BLD AUTO: 0.5 X10E3/UL (ref 0.1–0.9)
MONOCYTES NFR BLD AUTO: 9 %
NEUTROPHILS # BLD AUTO: 3.2 X10E3/UL (ref 1.4–7)
NEUTROPHILS NFR BLD AUTO: 59 %
PLATELET # BLD AUTO: 292 X10E3/UL (ref 150–450)
POTASSIUM SERPL-SCNC: 4.1 MMOL/L (ref 3.5–5.2)
PROT SERPL-MCNC: 6.2 G/DL (ref 6–8.5)
PTH-INTACT SERPL-MCNC: 20 PG/ML (ref 15–65)
RBC # BLD AUTO: 4.21 X10E6/UL (ref 3.77–5.28)
SL AMB VLDL CHOLESTEROL CALC: 16 MG/DL (ref 5–40)
SODIUM SERPL-SCNC: 142 MMOL/L (ref 134–144)
TRIGL SERPL-MCNC: 84 MG/DL (ref 0–149)
VIT A SERPL-MCNC: 44.8 UG/DL (ref 18.9–57.3)
VIT B1 BLD-SCNC: 138.1 NMOL/L (ref 66.5–200)
VIT B12 SERPL-MCNC: 693 PG/ML (ref 232–1245)
WBC # BLD AUTO: 5.4 X10E3/UL (ref 3.4–10.8)
ZINC SERPL-MCNC: 68 UG/DL (ref 44–115)

## 2023-02-21 ENCOUNTER — OFFICE VISIT (OUTPATIENT)
Dept: BARIATRICS | Facility: CLINIC | Age: 28
End: 2023-02-21

## 2023-02-21 VITALS — BODY MASS INDEX: 34.1 KG/M2 | HEIGHT: 61 IN | WEIGHT: 180.6 LBS

## 2023-02-21 DIAGNOSIS — K91.2 POSTSURGICAL MALABSORPTION: Primary | ICD-10-CM

## 2023-02-21 DIAGNOSIS — N94.89 MENSTRUATION, SUPPRESSION: ICD-10-CM

## 2023-02-21 RX ORDER — ETHYNODIOL DIACETATE AND ETHINYL ESTRADIOL 1 MG-35MCG
1 KIT ORAL DAILY
Qty: 90 TABLET | Refills: 0 | Status: SHIPPED | OUTPATIENT
Start: 2023-02-21 | End: 2023-05-22

## 2023-02-21 NOTE — PROGRESS NOTES
Bariatric Follow Up Nutrition Note    Type of surgery  Gastric bypass: laparoscopic  Surgery Date: 22  6 months  post-op  Surgeon: Dr River Trejo  28 y o   female  Height 5' 1" (1 549 m), weight 81 9 kg (180 lb 9 6 oz)  Body mass index is 34 12 kg/m²      Initial:  268#  Weight on Day of Weight Loss Surgery: 219 8#  Weight in (lb) to have BMI = 25: 132 4#  Pre-Op Excess Wt: 135 6#  Post-Op Wt Loss: 88 2#/ 65% EBWL in 6 month(s)    Review of History and Medications   Past Medical History:   Diagnosis Date   • Anxiety    • Morbid obesity with BMI of 50 0-59 9, adult ([facility] [address] )    • Urinary tract infection      Past Surgical History:   Procedure Laterality Date   •  SECTION   and    • MA LAPS GSTR RSTCV PX W/BYP CECILE-EN-Y LIMB <150 CM N/A 2022    Procedure: LAPAROSCOPIC CECILE-EN-Y GASTRIC BYPASS AND INTRAOPERATIVE EGD;  Surgeon: Delfino Schultz MD;  Location: [facility];  Service: Bariatrics     Social History     Socioeconomic History   • Marital status: Single     Spouse name: Not on file   • Number of children: Not on file   • Years of education: Not on file   • Highest education level: Not on file   Occupational History   • Not on file   Tobacco Use   • Smoking status: Former     Packs/day: 0 25     Years: 5 00     Pack years: 1 25     Types: Cigarettes     Quit date: 6/10/2016     Years since quittin 7   • Smokeless tobacco: Never   • Tobacco comments:     I smoked on and off not 5 years straight   Vaping Use   • Vaping Use: Never used   Substance and Sexual Activity   • Alcohol use: Not Currently     Comment: I will have a drink 1-2 times a year   • Drug use: Not Currently     Types: Marijuana     Comment: I smoked a little in highschool   • Sexual activity: Yes     Partners: Female   Other Topics Concern   • Not on file   Social History Narrative    Lives with 2 daughters, best friend, best friend's       Social Determinants of Health Financial Resource Strain: Not on file   Food Insecurity: Not on file   Transportation Needs: Not on file   Physical Activity: Not on file   Stress: Not on file   Social Connections: Not on file   Intimate Partner Violence: Not on file   Housing Stability: Not on file       Current Outpatient Medications:   •  Calcium 500-100 MG-UNIT CHEW, Chew, Disp: , Rfl:   •  ethynodiol-ethinyl estradiol (Zovia 1/35, 28,) 1-35 MG-MCG per tablet, Take 1 tablet by mouth daily, Disp: 90 tablet, Rfl: 0  •  Multiple Vitamin (multivitamin) tablet, Take 1 tablet by mouth daily, Disp: , Rfl:     Food Intake and Lifestyle Assessment   Food Intake Assessment completed via usual diet recall--not keeping a food log, has a lot of other issues going on that logging is on her mind  Wake:  6:30am, has to leave for work at [employer]: 8am-more often doing eggs (1 egg with cheese and broccoli, may increase to 2 eggs because was a little hungry after doing one egg) OR light and fit yogurt with fruit (suggested to add nuts or 2 tbsp of granola to add more substance)  Snack: 10am-yogurt covered raisins OR rice cakes (suggested to add protein ie: PB or hummus)  Lunch: 12pm-some quinoa and shrimp OR chicken salad OR grilled chicken with veggies OR turkey, cheese and crackers   Snack: leaves work at [employer]  Occasional protein OR hummus w/hayden chips OR rice cakes OR PB and apple OR may finish the turkey, cheese and crackers  Dinner: 6pm-Frozen meal only about once over the past week OR chicken and corn last night OR protein and veggies most often  Snack: tries not to  *only one instance of low blood sugar since last visit      Beverage intake: water and sugar free beverages  Diet texture/stage: regular  Protein supplement: none at this time  Estimated protein intake per day: 50-70gm  Estimated fluid intake per day: 48-62oz  Meals eaten away from home: not often  Typical meal pattern: 3 meals per day and 2-3 snacks per day  Eating Behaviors: Appropriate diet advancement, Appropriate portion sizes and Does not drink with meals and waits 30-minutes after meal before resuming drinking    Food allergies or intolerances: none  Cultural or Advent considerations: -    Vitamins:  BariPal w/45mg iron  BariPal Calcium citrate 1 TID  Labs from 2/1/23 WNL therefore no need to change regimen    *did intake and getting set up with a therapist which could take up to 6-8 weeks, but it is in the works  Physical Assessment  Nutrition Related Findings  Diarrhea/soft BM-every day (maybe from excess sugar substitutes/sugar alcohols)  Lightheadedness likely due to Hypoglycemia     Physical Activity  Types of exercise: plan is to do the [employer] 5 [place] in [place] then end of the month--most likely walking it  Current physical limitations: -    Psychosocial Assessment   Support systems: friend(s) relative(s)  Socioeconomic factors: none    Nutrition Diagnosis  Diagnosis: Inappropriate intake of carbohydrates (NI-5 8 3) and Disordered eating pattern (NB-1 5)  Related to: Altered GI function  As Evidenced by: shaky and lightheaded likely due to low blood sugar     Interventions and Teaching   Patient educated on post-op nutrition guidelines  Patient educated and handouts provided    Surgical changes to stomach / GI  Capacity of post-surgery stomach  Adequate hydration  Expected weight loss  Weight loss plateaus/ possibility of weight regain  Exercise  Nutrition considerations after surgery  Protein supplements  Meal planning and preparation  Appropriate carbohydrate, protein, and fat intake, and food/fluid choices to maximize safe weight loss, nutrient intake, and tolerance   Dietary and lifestyle changes  Possible problems with poor eating habits  Intuitive eating  Techniques for self monitoring and keeping daily food journal  Potential for food intolerance after surgery, and ways to deal with them including: lactose intolerance, nausea, reflux, vomiting, diarrhea, food intolerance, appetite changes, gas  Vitamins (see above)    Education provided to: patient    Barriers to learning: No barriers identified    Readiness to change: preparation    Comprehension: verbalizes understanding     Expected Compliance: good    Pt presents today with maintaining her weight in the past 3 weeks  She has changed her eating schedule and food choices, now eating more "real food" and relying less on protein bars and shakes  She is feeling less issues with low blood sugar since the changes  Reports she only had one instance of low blood sugar since last visit  Reviewed diet recall and some of her snacks are just carbs therefore did remind pt to combine with a protein for better blood sugar control ie: add PB, hummus, cheese stick, etc   Pt is scheduled to go to [place] to run the [employer] this weekend  Encouraged to ensure she fuels herself properly to avoid low blood sugars during the race and the heat  Pt is still struggling with her 8year old son's mental health and therefore, her mental health, but she does report she had an intake consult and is now waiting to get set up with a therapist  She is aware she needs the support and needs to work on her anxiety  Pt is also scheduled with the bariatric SW on 2/27  Feel pt is doing well nutritionally at this time  Advised to schedule a f/u appt with RD PRN       Goals  Food journal food and s/s  Exercise 30 minutes 5 times per week--start with walking  Continue to eat 3 meals per day + 3 snacks to prevent hypoglycemia  Eat every 2hrs with a combo of complex carb + protein  Ensure having protein with even the snacks  Meet with SW on 2/27     Time Spent:   30 Minutes

## 2023-05-02 DIAGNOSIS — N94.89 MENSTRUATION, SUPPRESSION: ICD-10-CM

## 2023-05-03 RX ORDER — ETHYNODIOL DIACETATE AND ETHINYL ESTRADIOL 1 MG-35MCG
1 KIT ORAL DAILY
Qty: 84 TABLET | Refills: 0 | Status: SHIPPED | OUTPATIENT
Start: 2023-05-03 | End: 2023-08-01

## 2023-05-11 ENCOUNTER — TELEPHONE (OUTPATIENT)
Dept: FAMILY MEDICINE CLINIC | Facility: CLINIC | Age: 28
End: 2023-05-11

## 2023-05-11 DIAGNOSIS — N94.89 MENSTRUATION, SUPPRESSION: ICD-10-CM

## 2023-05-11 NOTE — TELEPHONE ENCOUNTER
Patient is requesting a new Rx be called in for her OCP  She would like a Rx with all active tabs (21 day Supply) for insurance purposed

## 2023-05-12 ENCOUNTER — OFFICE VISIT (OUTPATIENT)
Dept: FAMILY MEDICINE CLINIC | Facility: CLINIC | Age: 28
End: 2023-05-12

## 2023-05-12 ENCOUNTER — TELEPHONE (OUTPATIENT)
Dept: FAMILY MEDICINE CLINIC | Facility: CLINIC | Age: 28
End: 2023-05-12

## 2023-05-12 VITALS
HEART RATE: 82 BPM | OXYGEN SATURATION: 97 % | BODY MASS INDEX: 37.6 KG/M2 | DIASTOLIC BLOOD PRESSURE: 74 MMHG | HEIGHT: 60 IN | WEIGHT: 191.5 LBS | RESPIRATION RATE: 21 BRPM | TEMPERATURE: 98 F | SYSTOLIC BLOOD PRESSURE: 130 MMHG

## 2023-05-12 DIAGNOSIS — F90.2 ATTENTION DEFICIT HYPERACTIVITY DISORDER, COMBINED TYPE: ICD-10-CM

## 2023-05-12 DIAGNOSIS — Z00.00 ANNUAL PHYSICAL EXAM: Primary | ICD-10-CM

## 2023-05-12 DIAGNOSIS — F41.1 GENERALIZED ANXIETY DISORDER: ICD-10-CM

## 2023-05-12 DIAGNOSIS — F43.10 POSTTRAUMATIC STRESS DISORDER: ICD-10-CM

## 2023-05-12 DIAGNOSIS — N94.89 MENSTRUATION, SUPPRESSION: Primary | ICD-10-CM

## 2023-05-12 DIAGNOSIS — E78.5 HYPERLIPIDEMIA, UNSPECIFIED HYPERLIPIDEMIA TYPE: ICD-10-CM

## 2023-05-12 DIAGNOSIS — J30.2 SEASONAL ALLERGIC RHINITIS, UNSPECIFIED TRIGGER: ICD-10-CM

## 2023-05-12 DIAGNOSIS — F33.1 MAJOR DEPRESSIVE DISORDER, RECURRENT EPISODE, MODERATE (HCC): ICD-10-CM

## 2023-05-12 RX ORDER — SERTRALINE HYDROCHLORIDE 100 MG/1
TABLET, FILM COATED ORAL
COMMUNITY
Start: 2023-05-05

## 2023-05-12 RX ORDER — HYDROXYZINE HYDROCHLORIDE 10 MG/1
10-20 TABLET, FILM COATED ORAL
COMMUNITY
Start: 2023-04-24

## 2023-05-12 RX ORDER — LORATADINE 10 MG/1
10 TABLET ORAL DAILY
Qty: 30 TABLET | Refills: 5 | Status: SHIPPED | OUTPATIENT
Start: 2023-05-12

## 2023-05-12 RX ORDER — COVID-19 MOLECULAR TEST ASSAY
KIT MISCELLANEOUS
COMMUNITY
Start: 2023-03-09 | End: 2023-05-12

## 2023-05-12 RX ORDER — ETHYNODIOL DIACETATE AND ETHINYL ESTRADIOL 1 MG-35MCG
1 KIT ORAL DAILY
Qty: 21 TABLET | Refills: 16 | Status: SHIPPED | OUTPATIENT
Start: 2023-05-12 | End: 2023-05-12

## 2023-05-12 NOTE — TELEPHONE ENCOUNTER
Spoke with pharmacy- they notified me that they were able to change the Rx to Nortrel which comes in a 21 day supply and medication was dispensed already  I have updated the chart with the right medicine  I spoke with pt and also discussed that if any further refills needed to let us know and if the brand needs to be changed, she should let us know

## 2023-05-12 NOTE — PROGRESS NOTES
[address] [facility]    NAME: Mikael Null  AGE: 33 y o  SEX: female  : 1995     DATE: 2023     Assessment and Plan:     1  Annual physical exam  Assessment & Plan:  Patient considering starting ADHD medication, psychiatrist asked for EKG which was performed today  EKG WNL no prolonged QTc  Had bariatric surgery 1 year ago and is taking all of her supplements  Complained of congestion due to seasonal allergies, says loratadine works for her  Claritin prescription was sent  She is otherwise doing well and has no complaints  She had baseline labs performed out of network, she showed me the lab reports on her phone  LDL slightly elevated at 111 but she is making lifestyle modifications which were discussed  2  Seasonal allergic rhinitis, unspecified trigger  -     loratadine (CLARITIN) 10 mg tablet; Take 1 tablet (10 mg total) by mouth daily    3  Hyperlipidemia, unspecified hyperlipidemia type    4  Attention deficit hyperactivity disorder, combined type  -     POCT ECG    5  Major depressive disorder, recurrent episode, moderate (HCC)  -     POCT ECG    6  Generalized anxiety disorder  -     POCT ECG    7  Posttraumatic stress disorder  -     POCT ECG        Counseling:  Alcohol/drug use: discussed moderation in alcohol intake, the recommendations for healthy alcohol use, and avoidance of illicit drug use  Dental Health: discussed importance of regular tooth brushing, flossing, and dental visits  Injury prevention: discussed safety/seat belts, safety helmets, smoke detectors, carbon dioxide detectors, and smoking near bedding or upholstery  Sexual health: discussed sexually transmitted diseases, partner selection, use of condoms, avoidance of unintended pregnancy, and contraceptive alternatives  Exercise: the importance of regular exercise/physical activity was discussed   Recommend exercise 3-5 times per week for at least 30 minutes  · Immunizations and preventive care screenings were discussed with patient today  Appropriate education was printed on patient's after visit summary  Depression Screening and Follow-up Plan: Patient was screened for depression during today's encounter  They screened negative with a PHQ-2 score of 1  Return in 1 year (on 2024)  Chief Complaint:     Chief Complaint   Patient presents with   • Physical Exam     Needs EKG for psych dr  She already had blood work  History of Present Illness:     Adult Annual Physical   Patient here for a comprehensive physical exam  The patient reports no problems  Diet and Physical Activity  · Diet/Nutrition: well balanced diet  · Exercise: walking  Depression Screening  PHQ-2/9 Depression Screening    Little interest or pleasure in doing things: 1 - several days  Feeling down, depressed, or hopeless: 0 - not at all  Trouble falling or staying asleep, or sleeping too much: 0 - not at all  Feeling tired or having little energy: 3 - nearly every day  Poor appetite or overeatin - several days  Feeling bad about yourself - or that you are a failure or have let yourself or your family down: 1 - several days  Trouble concentrating on things, such as reading the newspaper or watching television: 0 - not at all  Moving or speaking so slowly that other people could have noticed  Or the opposite - being so fidgety or restless that you have been moving around a lot more than usual: 0 - not at all  Thoughts that you would be better off dead, or of hurting yourself in some way: 0 - not at all  PHQ-2 Score: 1  PHQ-2 Interpretation: Negative depression screen  PHQ-9 Score: 6   PHQ-9 Interpretation: Mild depression        General Health  · Sleep: sleeps well and uses atarax  has nightmares and atarax is helping  · Hearing: normal - bilateral   · Vision: no vision problems  · Dental: regular dental visits         /GYN Health  · Last menstrual period: Amenorrhea 2/2 OCPs  · Contraceptive method: none  · History of STDs?: no      Review of Systems:     Review of Systems   Constitutional: Negative for chills and fever  HENT: Positive for congestion  Negative for ear pain and sore throat  Eyes: Negative for pain and visual disturbance  Respiratory: Negative for cough and shortness of breath  Cardiovascular: Negative for chest pain and palpitations  Gastrointestinal: Negative for abdominal pain and vomiting  Genitourinary: Negative for dysuria and hematuria  Musculoskeletal: Negative for arthralgias and back pain  Skin: Negative for rash  Neurological: Negative for seizures and syncope        Past Medical History:     Past Medical History:   Diagnosis Date   • Anxiety    • Morbid obesity with BMI of 50 0-59 9, [facility] )    • Urinary tract infection       Past Surgical History:     Past Surgical History:   Procedure Laterality Date   •  SECTION   and    • NH LAPS GSTR RSTCV PX W/BYP CECILE-EN-Y LIMB <150 CM N/A 2022    Procedure: LAPAROSCOPIC CECILE-EN-Y GASTRIC BYPASS AND INTRAOPERATIVE EGD;  Surgeon: Caitlin Davalos MD;  Location: [facility];  Service: Bariatrics      Social History:     Social History     Socioeconomic History   • Marital status: Single     Spouse name: None   • Number of children: None   • Years of education: None   • Highest education level: None   Occupational History   • None   Tobacco Use   • Smoking status: Former     Packs/day: 0 25     Years: 5 00     Pack years: 1 25     Types: Cigarettes     Quit date: 6/10/2016     Years since quittin 9   • Smokeless tobacco: Never   • Tobacco comments:     I smoked on and off not 5 years straight   Vaping Use   • Vaping Use: Never used   Substance and Sexual Activity   • Alcohol use: Not Currently     Comment: I will have a drink 1-2 times a year   • Drug use: Not Currently     Types: Marijuana     Comment: I smoked a little in highschool   • Sexual activity: Yes     Partners: Female   Other Topics Concern   • None   Social History Narrative    Lives with 2 daughters, best friend, best friend's       Social Determinants of Health     Financial Resource Strain: Not on file   Food Insecurity: Not on file   Transportation Needs: Not on file   Physical Activity: Not on file   Stress: Not on file   Social Connections: Not on file   Intimate Partner Violence: Not on file   Housing Stability: Not on file      Family History:     Family History   Problem Relation Age of Onset   • Lung cancer Mother    • Obesity Mother    • Cancer Mother         Passed away in 2006 of lung cancer   • Thyroid disease Father    • Obesity Father    • Obesity Sister    • Diabetes Neg Hx    • Stroke Neg Hx    • Heart disease Neg Hx       Current Medications:     Current Outpatient Medications   Medication Sig Dispense Refill   • loratadine (CLARITIN) 10 mg tablet Take 1 tablet (10 mg total) by mouth daily 30 tablet 5   • Calcium 500-100 MG-UNIT CHEW Chew     • hydrOXYzine HCL (ATARAX) 10 mg tablet Take 10-20 mg by mouth daily at bedtime     • Multiple Vitamin (multivitamin) tablet Take 1 tablet by mouth daily     • norethindrone-ethinyl estradiol (NORTREL 1-35 TAB) 1-35 MG-MCG per tablet Take 1 tablet by mouth daily 21 tablet 16   • sertraline (ZOLOFT) 100 mg tablet TAKE 1 TABLET BY MOUTH AT BEDTIME FOR DEPRESSION OR ANXIETY     • sertraline (ZOLOFT) 50 mg tablet        No current facility-administered medications for this visit  Allergies: Allergies   Allergen Reactions   • Other Hives     SOME shellfish       Physical Exam:     /74 (BP Location: Left arm, Patient Position: Sitting, Cuff Size: Standard)   Pulse 82   Temp 98 °F (36 7 °C) (Temporal)   Resp 21   Ht 5' (1 524 m)   Wt 86 9 kg (191 lb 8 oz)   SpO2 97%   BMI 37 40 kg/m²     Physical Exam  Vitals and nursing note reviewed     Constitutional:       General: She is not in acute distress  Appearance: She is well-developed  HENT:      Head: Normocephalic and atraumatic  Right Ear: Tympanic membrane, ear canal and external ear normal       Left Ear: Tympanic membrane, ear canal and external ear normal       Nose: Congestion present  Mouth/Throat:      Mouth: Mucous membranes are moist       Pharynx: Oropharynx is clear  Eyes:      General: No scleral icterus  Extraocular Movements: Extraocular movements intact  Conjunctiva/sclera: Conjunctivae normal    Cardiovascular:      Rate and Rhythm: Normal rate and regular rhythm  Heart sounds: No murmur heard  Pulmonary:      Effort: Pulmonary effort is normal  No respiratory distress  Breath sounds: Normal breath sounds  Abdominal:      General: Bowel sounds are normal       Palpations: Abdomen is soft  Tenderness: There is no abdominal tenderness  Musculoskeletal:         General: No swelling  Cervical back: Neck supple  Right lower leg: No edema  Left lower leg: No edema  Skin:     General: Skin is warm and dry  Capillary Refill: Capillary refill takes less than 2 seconds  Neurological:      General: No focal deficit present  Mental Status: She is alert     Psychiatric:         Mood and Affect: Mood normal           Jason Finn MD   [address] [place], [place] [address]

## 2023-05-12 NOTE — TELEPHONE ENCOUNTER
Pt is stating she was at pharmacy to  birth control, and medication is still wrong according to pharmacy  Medication needs to not have placebo in order for insurance to accept, and to be a 21 day not 28 day  Pt is in distress and needs medication filled due to no longer having any left

## 2023-05-12 NOTE — ASSESSMENT & PLAN NOTE
Patient considering starting ADHD medication, psychiatrist asked for EKG which was performed today  EKG WNL no prolonged QTc  Had bariatric surgery 1 year ago and is taking all of her supplements  Complained of congestion due to seasonal allergies, says loratadine works for her  Claritin prescription was sent  She is otherwise doing well and has no complaints  She had baseline labs performed out of network, she showed me the lab reports on her phone  LDL slightly elevated at 111 but she is making lifestyle modifications which were discussed

## 2023-05-25 ENCOUNTER — TELEPHONE (OUTPATIENT)
Dept: FAMILY MEDICINE CLINIC | Facility: CLINIC | Age: 28
End: 2023-05-25

## 2023-05-25 NOTE — TELEPHONE ENCOUNTER
Patient considering starting ADHD medication, psychiatrist asked for EKG which was performed in the office the day of visit 5/12/2023  Patient states she was advised to request a letter stating she is cleared to start Stimulant medication if she decided to do so in the future

## 2023-05-25 NOTE — LETTER
June 12, 2023     Patient: Kevin Howell  YOB: 1995  Date of Visit: 5/25/2023      To Whom it May Concern:    Kevin Howell is under my professional care  Michael Rock was seen in my office on 5/25/2023  Michael Rock is under evaluation by her psychiatrist to begin ADHD medication and an EKG was requested  Patient's EKG was normal sinus rhythm  Initiation of ADHD medication deferred to psychiatrist if he deems appropriate  If you have any questions or concerns, please don't hesitate to call           Sincerely,             Sunitha Page MD       CC: No Recipients

## 2023-07-14 ENCOUNTER — TELEPHONE (OUTPATIENT)
Dept: FAMILY MEDICINE CLINIC | Facility: CLINIC | Age: 28
End: 2023-07-14

## 2023-07-14 DIAGNOSIS — L30.8 OTHER ECZEMA: Primary | ICD-10-CM

## 2024-02-07 DIAGNOSIS — Z00.6 ENCOUNTER FOR EXAMINATION FOR NORMAL COMPARISON OR CONTROL IN CLINICAL RESEARCH PROGRAM: ICD-10-CM

## 2024-02-21 ENCOUNTER — HOSPITAL ENCOUNTER (EMERGENCY)
Facility: HOSPITAL | Age: 29
Discharge: HOME | End: 2024-02-21
Attending: EMERGENCY MEDICINE
Payer: COMMERCIAL

## 2024-02-21 ENCOUNTER — OFFICE VISIT (OUTPATIENT)
Dept: FAMILY MEDICINE CLINIC | Facility: CLINIC | Age: 29
End: 2024-02-21
Payer: COMMERCIAL

## 2024-02-21 VITALS
BODY MASS INDEX: 37.11 KG/M2 | HEART RATE: 90 BPM | DIASTOLIC BLOOD PRESSURE: 68 MMHG | TEMPERATURE: 98.4 F | SYSTOLIC BLOOD PRESSURE: 132 MMHG | WEIGHT: 189 LBS | HEIGHT: 60 IN | OXYGEN SATURATION: 99 %

## 2024-02-21 VITALS
TEMPERATURE: 98.1 F | OXYGEN SATURATION: 99 % | HEART RATE: 87 BPM | RESPIRATION RATE: 18 BRPM | SYSTOLIC BLOOD PRESSURE: 130 MMHG | DIASTOLIC BLOOD PRESSURE: 58 MMHG

## 2024-02-21 DIAGNOSIS — Z13.89 SCREENING FOR BLOOD OR PROTEIN IN URINE: Primary | ICD-10-CM

## 2024-02-21 DIAGNOSIS — G43.709 CHRONIC MIGRAINE WITHOUT AURA WITHOUT STATUS MIGRAINOSUS, NOT INTRACTABLE: ICD-10-CM

## 2024-02-21 DIAGNOSIS — R31.9 HEMATURIA: Primary | ICD-10-CM

## 2024-02-21 DIAGNOSIS — R31.0 GROSS HEMATURIA: ICD-10-CM

## 2024-02-21 PROBLEM — R53.82 CHRONIC FATIGUE: Status: ACTIVE | Noted: 2022-03-17

## 2024-02-21 PROBLEM — G47.33 OBSTRUCTIVE SLEEP APNEA SYNDROME IN ADULT: Status: ACTIVE | Noted: 2022-03-17

## 2024-02-21 LAB
ANION GAP SERPL CALCULATED.3IONS-SCNC: 6 MMOL/L
APTT PPP: 34 SECONDS (ref 23–37)
BACTERIA UR QL AUTO: ABNORMAL /HPF
BASOPHILS # BLD AUTO: 0.06 THOUSANDS/ÂΜL (ref 0–0.1)
BASOPHILS NFR BLD AUTO: 1 % (ref 0–1)
BILIRUB UR QL STRIP: NEGATIVE
BUN SERPL-MCNC: 8 MG/DL (ref 5–25)
CALCIUM SERPL-MCNC: 8.5 MG/DL (ref 8.4–10.2)
CHLORIDE SERPL-SCNC: 105 MMOL/L (ref 96–108)
CK SERPL-CCNC: 75 U/L (ref 26–192)
CLARITY UR: ABNORMAL
CO2 SERPL-SCNC: 27 MMOL/L (ref 21–32)
COLOR UR: YELLOW
CREAT SERPL-MCNC: 0.73 MG/DL (ref 0.6–1.3)
EOSINOPHIL # BLD AUTO: 0.1 THOUSAND/ÂΜL (ref 0–0.61)
EOSINOPHIL NFR BLD AUTO: 1 % (ref 0–6)
ERYTHROCYTE [DISTWIDTH] IN BLOOD BY AUTOMATED COUNT: 13.6 % (ref 11.6–15.1)
GFR SERPL CREATININE-BSD FRML MDRD: 111 ML/MIN/1.73SQ M
GLUCOSE SERPL-MCNC: 80 MG/DL (ref 65–140)
GLUCOSE UR STRIP-MCNC: NEGATIVE MG/DL
HCT VFR BLD AUTO: 38 % (ref 34.8–46.1)
HGB BLD-MCNC: 12.5 G/DL (ref 11.5–15.4)
HGB UR QL STRIP.AUTO: ABNORMAL
IMM GRANULOCYTES # BLD AUTO: 0.04 THOUSAND/UL (ref 0–0.2)
IMM GRANULOCYTES NFR BLD AUTO: 0 % (ref 0–2)
INR PPP: 0.98 (ref 0.84–1.19)
KETONES UR STRIP-MCNC: NEGATIVE MG/DL
LEUKOCYTE ESTERASE UR QL STRIP: ABNORMAL
LYMPHOCYTES # BLD AUTO: 2.33 THOUSANDS/ÂΜL (ref 0.6–4.47)
LYMPHOCYTES NFR BLD AUTO: 22 % (ref 14–44)
MCH RBC QN AUTO: 27.9 PG (ref 26.8–34.3)
MCHC RBC AUTO-ENTMCNC: 32.9 G/DL (ref 31.4–37.4)
MCV RBC AUTO: 85 FL (ref 82–98)
MONOCYTES # BLD AUTO: 0.89 THOUSAND/ÂΜL (ref 0.17–1.22)
MONOCYTES NFR BLD AUTO: 8 % (ref 4–12)
NEUTROPHILS # BLD AUTO: 7.29 THOUSANDS/ÂΜL (ref 1.85–7.62)
NEUTS SEG NFR BLD AUTO: 68 % (ref 43–75)
NITRITE UR QL STRIP: NEGATIVE
NON-SQ EPI CELLS URNS QL MICRO: ABNORMAL /HPF
NRBC BLD AUTO-RTO: 0 /100 WBCS
PH UR STRIP.AUTO: 7 [PH]
PLATELET # BLD AUTO: 401 THOUSANDS/UL (ref 149–390)
PMV BLD AUTO: 9.2 FL (ref 8.9–12.7)
POTASSIUM SERPL-SCNC: 4.2 MMOL/L (ref 3.5–5.3)
PROT UR STRIP-MCNC: NEGATIVE MG/DL
PROTHROMBIN TIME: 13.2 SECONDS (ref 11.6–14.5)
RBC # BLD AUTO: 4.48 MILLION/UL (ref 3.81–5.12)
RBC #/AREA URNS AUTO: ABNORMAL /HPF
SL AMB  POCT GLUCOSE, UA: NORMAL
SL AMB LEUKOCYTE ESTERASE,UA: NORMAL
SL AMB POCT BILIRUBIN,UA: NORMAL
SL AMB POCT BLOOD,UA: NORMAL
SL AMB POCT KETONES,UA: NORMAL
SL AMB POCT NITRITE,UA: NORMAL
SL AMB POCT PH,UA: 6
SL AMB POCT SPECIFIC GRAVITY,UA: 1.03
SL AMB POCT URINE HCG: NEGATIVE
SL AMB POCT URINE PROTEIN: NORMAL
SODIUM SERPL-SCNC: 138 MMOL/L (ref 135–147)
SP GR UR STRIP.AUTO: 1.02 (ref 1–1.03)
UROBILINOGEN UR QL STRIP.AUTO: 0.2 E.U./DL
WBC # BLD AUTO: 10.71 THOUSAND/UL (ref 4.31–10.16)
WBC #/AREA URNS AUTO: ABNORMAL /HPF

## 2024-02-21 PROCEDURE — 80048 BASIC METABOLIC PNL TOTAL CA: CPT | Performed by: EMERGENCY MEDICINE

## 2024-02-21 PROCEDURE — 82550 ASSAY OF CK (CPK): CPT | Performed by: EMERGENCY MEDICINE

## 2024-02-21 PROCEDURE — 81002 URINALYSIS NONAUTO W/O SCOPE: CPT | Performed by: NURSE PRACTITIONER

## 2024-02-21 PROCEDURE — 87086 URINE CULTURE/COLONY COUNT: CPT | Performed by: EMERGENCY MEDICINE

## 2024-02-21 PROCEDURE — 99283 EMERGENCY DEPT VISIT LOW MDM: CPT

## 2024-02-21 PROCEDURE — 85610 PROTHROMBIN TIME: CPT | Performed by: EMERGENCY MEDICINE

## 2024-02-21 PROCEDURE — 99284 EMERGENCY DEPT VISIT MOD MDM: CPT | Performed by: EMERGENCY MEDICINE

## 2024-02-21 PROCEDURE — 85025 COMPLETE CBC W/AUTO DIFF WBC: CPT | Performed by: EMERGENCY MEDICINE

## 2024-02-21 PROCEDURE — 81001 URINALYSIS AUTO W/SCOPE: CPT | Performed by: EMERGENCY MEDICINE

## 2024-02-21 PROCEDURE — 85730 THROMBOPLASTIN TIME PARTIAL: CPT | Performed by: EMERGENCY MEDICINE

## 2024-02-21 PROCEDURE — 81025 URINE PREGNANCY TEST: CPT | Performed by: NURSE PRACTITIONER

## 2024-02-21 PROCEDURE — 36415 COLL VENOUS BLD VENIPUNCTURE: CPT | Performed by: EMERGENCY MEDICINE

## 2024-02-21 PROCEDURE — 99214 OFFICE O/P EST MOD 30 MIN: CPT | Performed by: NURSE PRACTITIONER

## 2024-02-21 RX ORDER — ACETAMINOPHEN 325 MG/1
975 TABLET ORAL ONCE
Status: COMPLETED | OUTPATIENT
Start: 2024-02-21 | End: 2024-02-21

## 2024-02-21 RX ORDER — TRAZODONE HYDROCHLORIDE 50 MG/1
50 TABLET ORAL
COMMUNITY
Start: 2023-12-26

## 2024-02-21 RX ORDER — ALBUTEROL SULFATE 90 UG/1
2 AEROSOL, METERED RESPIRATORY (INHALATION)
COMMUNITY
Start: 2024-02-12

## 2024-02-21 RX ORDER — SUMATRIPTAN 5 MG/1
1 SPRAY NASAL EVERY 2 HOUR PRN
Qty: 6 EACH | Refills: 5 | Status: SHIPPED | OUTPATIENT
Start: 2024-02-21 | End: 2024-03-22

## 2024-02-21 RX ADMIN — ACETAMINOPHEN 975 MG: 325 TABLET ORAL at 20:26

## 2024-02-21 NOTE — PROGRESS NOTES
Assessment/Plan:    No problem-specific Assessment & Plan notes found for this encounter.       Diagnoses and all orders for this visit:    Screening for blood or protein in urine  -     POCT urine dip  -     Urine culture  -     POCT urine HCG    Gross hematuria  -     CT chest w contrast abdomen and pelvis wo; Future    Chronic migraine without aura without status migrainosus, not intractable  -     SUMAtriptan (IMITREX) 5 MG/ACT nasal spray; 1 spray (5 mg total) into each nostril every 2 (two) hours as needed for migraine ; if headache returns/persists, may repeat dose once after 2 hours    Other orders  -     albuterol (PROVENTIL HFA,VENTOLIN HFA) 90 mcg/act inhaler; Inhale 2 puffs every 4 - 6 hours as needed (Patient not taking: Reported on 2/21/2024)  -     traZODone (DESYREL) 50 mg tablet; Take 50 mg by mouth          Subjective:      Patient ID: Fay Alexandra is a 28 y.o. female.    Patient to clinic for complaints of headaches since December and blood in urine for one week. Patient states she started with headaches when she had COVID in December. Has been intermittent since, but in the last month has been occurring almost daily. States headache is throbbing behind right eye. Denies aura, vision changes, n/v. Urine noted with foul odor and urine POCT showed +3 blood, +protein, and noted sediment. Asymptomatic on exam. On birth control - patient reports she does not get periods, is not sexually active. Transferred to ER for further evaluation. ER nurse notified.        The following portions of the patient's history were reviewed and updated as appropriate: allergies, current medications, past family history, past medical history, past social history, past surgical history, and problem list.    Review of Systems   Constitutional: Negative.    HENT: Negative.     Eyes: Negative.    Respiratory: Negative.     Cardiovascular: Negative.    Gastrointestinal: Negative.    Endocrine: Negative.    Genitourinary:   Positive for hematuria.   Musculoskeletal: Negative.    Skin: Negative.    Allergic/Immunologic: Negative.    Neurological:  Positive for headaches.   Hematological: Negative.    Psychiatric/Behavioral: Negative.           Objective:      /68 (BP Location: Left arm, Patient Position: Sitting, Cuff Size: Standard)   Pulse 90   Temp 98.4 °F (36.9 °C) (Tympanic)   Ht 5' (1.524 m)   Wt 85.7 kg (189 lb)   SpO2 99%   BMI 36.91 kg/m²          Physical Exam  Constitutional:       General: She is not in acute distress.     Appearance: Normal appearance. She is not ill-appearing, toxic-appearing or diaphoretic.   HENT:      Head: Normocephalic and atraumatic.      Right Ear: External ear normal.      Left Ear: External ear normal.      Nose: Nose normal. No congestion.      Mouth/Throat:      Mouth: Mucous membranes are moist.   Eyes:      General:         Right eye: No discharge.         Left eye: No discharge.      Conjunctiva/sclera: Conjunctivae normal.   Cardiovascular:      Rate and Rhythm: Normal rate and regular rhythm.      Pulses: Normal pulses.      Heart sounds: Normal heart sounds.   Pulmonary:      Effort: Pulmonary effort is normal. No respiratory distress.      Breath sounds: Normal breath sounds. No wheezing, rhonchi or rales.   Abdominal:      Palpations: Abdomen is soft.   Musculoskeletal:         General: Normal range of motion.      Cervical back: Normal range of motion. No rigidity.      Right lower leg: No edema.      Left lower leg: No edema.   Skin:     General: Skin is warm and dry.   Neurological:      General: No focal deficit present.      Mental Status: She is alert and oriented to person, place, and time.      Motor: No weakness.      Coordination: Coordination normal.      Gait: Gait normal.   Psychiatric:         Mood and Affect: Mood normal.         Behavior: Behavior normal.         Thought Content: Thought content normal.         Judgment: Judgment normal.

## 2024-02-21 NOTE — DISCHARGE INSTRUCTIONS
Follow-up with urology for further care. Contact info provided below if needed.  Stay hydrated.   Return to the ED with new or worsening symptoms.

## 2024-02-21 NOTE — ED PROVIDER NOTES
History  Chief Complaint   Patient presents with    Blood in Urine     Pt states she was just at [place] for visit for migraines, states she is having blood in her urine x1 week. Denies urinary symptoms      Pt is a 27yo F who presents for hematuria.  Patient reports this started approximately 1 week ago.  Patient denies any dysuria or other urinary complaints.  She states she is able to void fully.  Patient denies any back, flank, or abdominal pain.  Patient denies any fevers or chills.  Patient denies any history of kidney stones.  Patient denies any history of hematuria.  Patient states the only other symptom she has been having is migraines which she has a history of.  Patient reports she was seen at PCP today for her migraines and mentioned the hematuria.  They performed urinalysis and recommended she come to the ED for further evaluation.        Prior to Admission Medications   Prescriptions Last Dose Informant Patient Reported? Taking?   Calcium 500-100 MG-UNIT CHEW   Yes No   Sig: Chew   Patient not taking: Reported on 2024   Multiple Vitamin (multivitamin) tablet   Yes No   Sig: Take 1 tablet by mouth daily   Patient not taking: Reported on 2024   SUMAtriptan (IMITREX) 5 MG/ACT nasal spray   No No   Si spray (5 mg total) into each nostril every 2 (two) hours as needed for migraine ; if headache returns/persists, may repeat dose once after 2 hours   albuterol (PROVENTIL HFA,VENTOLIN HFA) 90 mcg/act inhaler   Yes No   Sig: Inhale 2 puffs every 4 - 6 hours as needed   Patient not taking: Reported on 2024   hydrOXYzine HCL (ATARAX) 10 mg tablet   Yes No   Sig: Take 10-20 mg by mouth daily at bedtime   Patient not taking: Reported on 2024   hydrocortisone 2.5 % ointment   No No   Sig: Apply topically 2 (two) times a day   Patient not taking: Reported on 2024   loratadine (CLARITIN) 10 mg tablet   No No   Sig: Take 1 tablet (10 mg total) by mouth daily   Patient not taking: Reported  on 2024   norethindrone-ethinyl estradiol (NORTREL 1-35 TAB) 1-35 MG-MCG per tablet   No No   Sig: Take 1 tablet by mouth daily   sertraline (ZOLOFT) 100 mg tablet   Yes No   Sig: Take 200 mg by mouth daily 2 100mg per day.   sertraline (ZOLOFT) 50 mg tablet   Yes No   Patient not taking: Reported on 2024   traZODone (DESYREL) 50 mg tablet   Yes No   Sig: Take 50 mg by mouth      Facility-Administered Medications: None       Past Medical History:   Diagnosis Date    Anxiety     Morbid obesity with BMI of 50.0-59.9, adult (HCC)     Urinary tract infection        Past Surgical History:   Procedure Laterality Date     SECTION   and     CA LAPS GSTR RSTCV PX W/BYP CECILE-EN-Y LIMB <150 CM N/A 2022    Procedure: LAPAROSCOPIC CECILE-EN-Y GASTRIC BYPASS AND INTRAOPERATIVE EGD;  Surgeon: Quan Eaton MD;  Location: [facility];  Service: Bariatrics       Family History   Problem Relation Age of Onset    Lung cancer Mother     Obesity Mother     Cancer Mother         Passed away in  of lung cancer    Thyroid disease Father     Obesity Father     Obesity Sister     Diabetes Neg Hx     Stroke Neg Hx     Heart disease Neg Hx      I have reviewed and agree with the history as documented.    E-Cigarette/Vaping    E-Cigarette Use Never User      E-Cigarette/Vaping Substances    Nicotine No     THC No     CBD No     Flavoring No     Other No     Unknown No      Social History     Tobacco Use    Smoking status: Former     Current packs/day: 0.00     Average packs/day: 0.3 packs/day for 5.0 years (1.3 ttl pk-yrs)     Types: Cigarettes     Start date: 6/10/2011     Quit date: 6/10/2016     Years since quittin.7    Smokeless tobacco: Never    Tobacco comments:     I smoked on and off not 5 years straight   Vaping Use    Vaping status: Never Used   Substance Use Topics    Alcohol use: Not Currently     Comment: I will have a drink 1-2 times a year    Drug use: Not Currently     Types:  Marijuana     Comment: I smoked a little in highschool       Review of Systems   Genitourinary:  Positive for hematuria.   Neurological:  Positive for headaches.   All other systems reviewed and are negative.      Physical Exam  Physical Exam  Vitals reviewed.   Constitutional:       General: She is not in acute distress.     Appearance: She is well-developed. She is not toxic-appearing or diaphoretic.   HENT:      Head: Normocephalic and atraumatic.      Right Ear: External ear normal.      Left Ear: External ear normal.      Nose: Nose normal.      Mouth/Throat:      Pharynx: Oropharynx is clear.   Eyes:      Extraocular Movements: Extraocular movements intact.      Pupils: Pupils are equal, round, and reactive to light.   Cardiovascular:      Rate and Rhythm: Normal rate and regular rhythm.      Heart sounds: Normal heart sounds.   Pulmonary:      Effort: Pulmonary effort is normal. No respiratory distress.      Breath sounds: Normal breath sounds.   Abdominal:      General: There is no distension.      Palpations: Abdomen is soft.      Tenderness: There is no abdominal tenderness.   Musculoskeletal:         General: Normal range of motion.      Cervical back: Normal range of motion and neck supple.      Right lower leg: No edema.      Left lower leg: No edema.   Skin:     General: Skin is warm and dry.      Capillary Refill: Capillary refill takes less than 2 seconds.      Coloration: Skin is not pale.      Findings: No erythema or rash.   Neurological:      General: No focal deficit present.      Mental Status: She is alert and oriented to person, place, and time.   Psychiatric:         Speech: Speech normal.         Behavior: Behavior is cooperative.         Vital Signs  ED Triage Vitals   Temperature Pulse Respirations Blood Pressure SpO2   02/21/24 1659 02/21/24 1659 02/21/24 1659 02/21/24 1659 02/21/24 1659   98.1 °F (36.7 °C) 87 18 130/58 99 %      Temp Source Heart Rate Source Patient Position -  Orthostatic VS BP Location FiO2 (%)   02/21/24 1659 02/21/24 1659 02/21/24 1659 02/21/24 1659 --   Temporal Monitor Sitting Right arm       Pain Score       02/21/24 2026       4           Vitals:    02/21/24 1659   BP: 130/58   Pulse: 87   Patient Position - Orthostatic VS: Sitting         Visual Acuity      ED Medications  Medications   acetaminophen (TYLENOL) tablet 975 mg (975 mg Oral Given 2/21/24 2026)       Diagnostic Studies  Results Reviewed       Procedure Component Value Units Date/Time    Urine culture [[id number]] Collected: 02/21/24 1846    Lab Status: In process Specimen: Urine, Clean Catch Updated: 02/21/24 2023    Urine Microscopic [[id number]]  (Abnormal) Collected: 02/21/24 1846    Lab Status: Final result Specimen: Urine, Clean Catch Updated: 02/21/24 2008     RBC, UA 10-20 /hpf      WBC, UA 1-2 /hpf      Epithelial Cells Occasional /hpf      Bacteria, UA Moderate /hpf     UA (URINE) with reflex to Scope [[id number]]  (Abnormal) Collected: 02/21/24 1846    Lab Status: Final result Specimen: Urine, Clean Catch Updated: 02/21/24 1913     Color, UA Yellow     Clarity, UA Slightly Cloudy     Specific Gravity, UA 1.020     pH, UA 7.0     Leukocytes, UA Small     Nitrite, UA Negative     Protein, UA Negative mg/dl      Glucose, UA Negative mg/dl      Ketones, UA Negative mg/dl      Urobilinogen, UA 0.2 E.U./dl      Bilirubin, UA Negative     Occult Blood, UA Moderate    CK [[id number]]  (Normal) Collected: 02/21/24 1754    Lab Status: Final result Specimen: Blood from Arm, Right Updated: 02/21/24 1820     Total CK 75 U/L     Basic metabolic panel [[id number]] Collected: 02/21/24 1754    Lab Status: Final result Specimen: Blood from Arm, Right Updated: 02/21/24 1820     Sodium 138 mmol/L      Potassium 4.2 mmol/L      Chloride 105 mmol/L      CO2 27 mmol/L      ANION GAP 6 mmol/L      BUN 8 mg/dL      Creatinine 0.73 mg/dL      Glucose 80 mg/dL      Calcium 8.5 mg/dL      eGFR 111 ml/min/1.73sq m      Narrative:      National Kidney Disease Foundation guidelines for Chronic Kidney Disease (CKD):     Stage 1 with normal or high GFR (GFR > 90 mL/min/1.73 square meters)    Stage 2 Mild CKD (GFR = 60-89 mL/min/1.73 square meters)    Stage 3A Moderate CKD (GFR = 45-59 mL/min/1.73 square meters)    Stage 3B Moderate CKD (GFR = 30-44 mL/min/1.73 square meters)    Stage 4 Severe CKD (GFR = 15-29 mL/min/1.73 square meters)    Stage 5 End Stage CKD (GFR <15 mL/min/1.73 square meters)  Note: GFR calculation is accurate only with a steady state creatinine    Protime-INR [[id number]]  (Normal) Collected: 02/21/24 1754    Lab Status: Final result Specimen: Blood from Arm, Right Updated: 02/21/24 1814     Protime 13.2 seconds      INR 0.98    APTT [[id number]]  (Normal) Collected: 02/21/24 1754    Lab Status: Final result Specimen: Blood from Arm, Right Updated: 02/21/24 1814     PTT 34 seconds     CBC and differential [[id number]]  (Abnormal) Collected: 02/21/24 1754    Lab Status: Final result Specimen: Blood from Arm, Right Updated: 02/21/24 1801     WBC 10.71 Thousand/uL      RBC 4.48 Million/uL      Hemoglobin 12.5 g/dL      Hematocrit 38.0 %      MCV 85 fL      MCH 27.9 pg      MCHC 32.9 g/dL      RDW 13.6 %      MPV 9.2 fL      Platelets 401 Thousands/uL      nRBC 0 /100 WBCs      Neutrophils Relative 68 %      Immat GRANS % 0 %      Lymphocytes Relative 22 %      Monocytes Relative 8 %      Eosinophils Relative 1 %      Basophils Relative 1 %      Neutrophils Absolute 7.29 Thousands/µL      Immature Grans Absolute 0.04 Thousand/uL      Lymphocytes Absolute 2.33 Thousands/µL      Monocytes Absolute 0.89 Thousand/µL      Eosinophils Absolute 0.10 Thousand/µL      Basophils Absolute 0.06 Thousands/µL                    No orders to display              Procedures  Procedures         ED Course  ED Course as of 02/22/24 0036   Wed Feb 21, 2024   1721 Pt had UA that showed negative leuks and nitrites but 3+ blood. Urine culture  in process. Preg negative.    1803 WBC(!): 10.71  Mildly elevated. Non-diagnostic.    1803 Hemoglobin: 12.5  WNL and stable from prior.    1814 PTT: 34  WNL   1814 POCT INR: 0.98  WNL   1820 Basic metabolic panel  Reviewed and without actionable derangement.    1820 Creatinine: 0.73  WNL   1820 Total CK: 75  WNL. No indication of rhabdo.    1832 Reassessed pt who is resting comfortably. Discussed all results with pt. Awaiting PVR, pt to attempt urine sample now. Discussed with pt that low probability of nephrolithiasis without history and with no pain. Discussed risks benefits of CT to r/o bladder mass or anatomical cause of bleeding but with pt still requiring urology follow-up and likely cystoscopy, joint decision making used to forego CT scan at this time.    1840 PVR 23mL. Urine also not grossly bloody. Will send repeat UA.    1928 Leukocytes, UA(!): Small   1928 Nitrite, UA: Negative   1928 Blood, UA(!): Moderate  Awaiting micro.    2018 WBC, UA: 1-2   2019 Bacteria, UA(!): Moderate   2019 Epithelial Cells: Occasional  Will send for culture but not empirically treat as UTI.                                              Medical Decision Making  Pt is a 27yo F who presents with hematuria.    Differential diagnosis to include but not limited to [place] cystitis, nephrolithiasis, glomerulonephritis, anemia, rhabdo.  Will plan for labs and postvoid residual.  See ED course for results and details.    Plan to discharge pt with f/u to urology. Discussed returning the ED with new or worsening of symptoms. Discussed staying hydrated. Pt expressed understanding of discharge instructions and return precautions and is stable for discharge at this time. All questions were answered and pt was discharged without incident.       Amount and/or Complexity of Data Reviewed  Labs: ordered. Decision-making details documented in ED Course.    Risk  OTC drugs.             Disposition  Final diagnoses:   Hematuria     Time reflects  when diagnosis was documented in both MDM as applicable and the Disposition within this note       Time User Action Codes Description Comment    2/21/2024  6:47 PM Chanda Cline Add [R31.9] Hematuria           ED Disposition       ED Disposition   Discharge    Condition   Stable    Date/Time   Wed Feb 21, 2024  6:47 PM    Comment   Fay Leonardoo discharge to home/self care.                   Follow-up Information       Follow up With Specialties Details Why Contact Info    Jason Shaikh MD Urology Call on 2/22/2024  [address] [place] [address] [phone]              Discharge Medication List as of 2/21/2024  8:28 PM        CONTINUE these medications which have NOT CHANGED    Details   albuterol (PROVENTIL HFA,VENTOLIN HFA) 90 mcg/act inhaler Inhale 2 puffs every 4 - 6 hours as needed, Starting Mon 2/12/2024, Historical Med      Calcium 500-100 MG-UNIT CHEW Chew, Historical Med      hydrocortisone 2.5 % ointment Apply topically 2 (two) times a day, Starting Wed 7/19/2023, Normal      hydrOXYzine HCL (ATARAX) 10 mg tablet Take 10-20 mg by mouth daily at bedtime, Starting Mon 4/24/2023, Historical Med      loratadine (CLARITIN) 10 mg tablet Take 1 tablet (10 mg total) by mouth daily, Starting Fri 5/12/2023, Normal      Multiple Vitamin (multivitamin) tablet Take 1 tablet by mouth daily, Historical Med      norethindrone-ethinyl estradiol (NORTREL 1-35 TAB) 1-35 MG-MCG per tablet Take 1 tablet by mouth daily, Starting Fri 5/12/2023, Normal      !! sertraline (ZOLOFT) 100 mg tablet Take 200 mg by mouth daily 2 100mg per day., Starting Fri 5/5/2023, Historical Med      !! sertraline (ZOLOFT) 50 mg tablet Starting Mon 4/24/2023, Historical Med      SUMAtriptan (IMITREX) 5 MG/ACT nasal spray 1 spray (5 mg total) into each nostril every 2 (two) hours as needed for migraine ; if headache returns/persists, may repeat dose once after 2 hours, Starting Wed 2/21/2024, Until Fri 3/22/2024 at 2359,  Normal      traZODone (DESYREL) 50 mg tablet Take 50 mg by mouth, Starting Tue 12/26/2023, Historical Med       !! - Potential duplicate medications found. Please discuss with provider.          No discharge procedures on file.    PDMP Review       None            ED Provider  Electronically Signed by             Chanda Cline MD  02/22/24 0036

## 2024-02-23 LAB — BACTERIA UR CULT: NORMAL

## 2024-02-24 LAB
BACTERIA UR CULT: NORMAL
Lab: NORMAL

## 2024-02-26 ENCOUNTER — TELEPHONE (OUTPATIENT)
Dept: FAMILY MEDICINE CLINIC | Facility: CLINIC | Age: 29
End: 2024-02-26

## 2024-02-26 NOTE — TELEPHONE ENCOUNTER
Contacted patient for follow up. States she contacted [facility] urology and spoke with them Friday. The office will return her call. Advised to follow up if needs assistance. She reports her hematuria resolved. States pain is being managed well with Tylenol and Sumatriptan. Will call if any further questions or concerns.

## 2024-03-18 ENCOUNTER — NURSE TRIAGE (OUTPATIENT)
Age: 29
End: 2024-03-18

## 2024-03-18 NOTE — TELEPHONE ENCOUNTER
Spoke to pt and advised per BRANDY that she ask her PCP for CT to diagnose kidney stone prior to new pt appt.  ER precautions were reviewed with pt.

## 2024-03-18 NOTE — TELEPHONE ENCOUNTER
New patient called in with concerns of gross hematuria. Denies any fevers or blood clots. She was seen in ED on 2/21 for this and it went away but started again yesterday. Pink in color and intermittent. She did have abdominal pain the other night but that has subsided. Scheduled new pt appt in [place]. Advised to increase water intake and monitor for worsening symptoms. ED precautions reviewed.       Additional Information   Affirmative: The patient has Gross Hematuria without clots, new onset, with negative urine testing    Answer Questions - Initial Assessment  When did this start: yesterday    Do you have dysuria: no    Do you have lower back, flank, or lower abdominal/bladder pain: abdominal pain    Do you have urinary frequency, urgency, or changes in your urination: no    Do you have any blood clots: no    Have you become unable to urinate: no    Is the hematuria continuous or intermittent: intermittent    Can you describe the color of the blood in the urine in relation to a beverage: pink     Have you had recent urologic surgery or procedure:no    Have you had any recent urine testing or imaging? (UA with micro, urine culture, kidney ultrasound, CT scan): ua & cx     Do you have a history of bladder cancer: no    Protocols used: Gross Hemaruria

## 2024-04-01 ENCOUNTER — NURSE TRIAGE (OUTPATIENT)
Age: 29
End: 2024-04-01

## 2024-04-01 ENCOUNTER — TELEPHONE (OUTPATIENT)
Age: 29
End: 2024-04-01

## 2024-04-01 NOTE — TELEPHONE ENCOUNTER
Dione, my name is Fay Carvajal. My date of birth is [birth year]. I was seeing back in February because P blood and whatnot and Long story short, I'm trying to get in with the [facility], the [facility] and because I'm not established as a patient, they can't order anything. So they asked me to call you guys and see if you can call in and order for a urinalysis and a urine culture to see how much is the kidney stone and how much maybe a potential Pi. So if you guys can call me back and let me know if that's something you can order and you can be seen. Again, I'm really hoping you don't because I know a lot of pain and I just want to go with my number is [phone]. Also, if it is something you can do, they need you to send them the results when they come in since you guys are no longer. Thank you. Have a good one.

## 2024-04-01 NOTE — TELEPHONE ENCOUNTER
Patient called in, she is a new patient w/ her first appt set up for 4/26. She has to go to [place] office d/t insurance. Patient has a stone, (imaging not available, had at [facility]). Patient states she is having severe pain 8/10 after urination. Patient has been to the ER twice and they giver her pain meds. She is also taking Tamsulosin. Patient states at one ER visit she was told she had UTI, but they didn't treat it. States she does have frequency, denies fever, N/V.  Advised her to call PCP re; urine testing since she is not yet established. Reviewed ED precautions. Patient hoping to get into office sooner.

## 2024-04-02 ENCOUNTER — TELEPHONE (OUTPATIENT)
Age: 29
End: 2024-04-02

## 2024-04-02 NOTE — TELEPHONE ENCOUNTER
Called patient and she advised that she could not come in office for a visit during the day as she works.     Suggested she visit [facility] since they are open until 8 to get a urine culture.

## 2024-04-02 NOTE — TELEPHONE ENCOUNTER
AYSHA on clinical line:    Dione, my name is Fay Carvajal, date of birth for [birth year]. I currently have a kidney stone and I probably have a UTI and your urology. I asked me to give you paul a call because I'm not established as a patient there because my appointment is not until the end of the month. They want me to get a urine culture and urinalysis and they want me to see if you ramonalauren can call in that order. I don't know if I need to be seen, but just let me know. My number is [phone]. I was seen back in February for this issue along with blood in my urine. So I have been seen by you guys for this general issue. Have a great day.  You received a voice mail from [facility].    Dr. Trevino - are you able to place an order for UA or does she need to come in for an appt?

## 2024-04-03 NOTE — TELEPHONE ENCOUNTER
Call placed to patient and spoke with her. Informed her that at this time, he scheduled appointment with Dr. Bishop is the soonest available.   ER precautions were reviewed with the patient if symptoms worsen or become severe. She verbalized her understanding

## 2024-04-04 ENCOUNTER — OFFICE VISIT (OUTPATIENT)
Dept: URGENT CARE | Facility: CLINIC | Age: 29
End: 2024-04-04
Payer: COMMERCIAL

## 2024-04-04 VITALS
WEIGHT: 191 LBS | SYSTOLIC BLOOD PRESSURE: 130 MMHG | DIASTOLIC BLOOD PRESSURE: 70 MMHG | OXYGEN SATURATION: 97 % | RESPIRATION RATE: 18 BRPM | TEMPERATURE: 97.2 F | HEART RATE: 96 BPM | BODY MASS INDEX: 37.3 KG/M2

## 2024-04-04 DIAGNOSIS — N39.0 URINARY TRACT INFECTION WITHOUT HEMATURIA, SITE UNSPECIFIED: Primary | ICD-10-CM

## 2024-04-04 LAB
SL AMB  POCT GLUCOSE, UA: NEGATIVE
SL AMB LEUKOCYTE ESTERASE,UA: ABNORMAL
SL AMB POCT BILIRUBIN,UA: NEGATIVE
SL AMB POCT BLOOD,UA: ABNORMAL
SL AMB POCT CLARITY,UA: ABNORMAL
SL AMB POCT COLOR,UA: ABNORMAL
SL AMB POCT KETONES,UA: 15
SL AMB POCT NITRITE,UA: NEGATIVE
SL AMB POCT PH,UA: 5
SL AMB POCT SPECIFIC GRAVITY,UA: 1.02
SL AMB POCT URINE PROTEIN: NEGATIVE
SL AMB POCT UROBILINOGEN: 0.2

## 2024-04-04 PROCEDURE — 81002 URINALYSIS NONAUTO W/O SCOPE: CPT | Performed by: PHYSICIAN ASSISTANT

## 2024-04-04 PROCEDURE — 99203 OFFICE O/P NEW LOW 30 MIN: CPT | Performed by: PHYSICIAN ASSISTANT

## 2024-04-04 RX ORDER — CEPHALEXIN 500 MG/1
500 CAPSULE ORAL EVERY 12 HOURS SCHEDULED
Qty: 10 CAPSULE | Refills: 0 | Status: SHIPPED | OUTPATIENT
Start: 2024-04-04 | End: 2024-04-09

## 2024-04-04 NOTE — PROGRESS NOTES
[facility]        NAME: Fay Alexandra is a 28 y.o. female  : 1995    MRN: [id number]  DATE: 2024  TIME: 6:06 PM    Assessment and Plan   Urinary tract infection without hematuria, site unspecified [N39.0]  1. Urinary tract infection without hematuria, site unspecified  POCT urine dip    cephalexin (KEFLEX) 500 mg capsule    Urine culture        Recommend keep follow-up with urology. Discussed strict return to care precautions as well as red flag symptoms which should prompt immediate ED referral. Pt verbalized understanding and is in agreement with plan.  Please follow up with your primary care provider within the next week. Please remember that your visit today was with an urgent care provider and should not replace follow up with your primary care provider for chronic medical issues or annual physicals.       Patient Instructions       Follow up with PCP in 3-5 days.  Proceed to  ER if symptoms worsen.    If tests are performed, our office will contact you with results only if changes need to made to the care plan discussed with you at the visit. You can review your full results on [facility].    Chief Complaint     Chief Complaint   Patient presents with    Possible UTI     Painful urination x 2 weeks         History of Present Illness       Patient is a 28-year-old female presenting out of concern for UTI x 2 weeks.  States she has been seen in the ER twice for similar symptoms within the last 2 weeks and diagnosed with a kidney infection.  Was told she also had a UTI but was only given 1 dose of antibiotics and was apparently not sent home with any antibiotics.  She complains of dysuria, urgency, feelings of incomplete bladder emptying.  Had hematuria last week but this is resolved.  No fevers or vomiting.  Has appointment with urology coming up in a few weeks and is on their cancellation list.  Denies possibility of pregnancy or STDs.        Review of Systems   Review of  Systems   Constitutional:  Negative for chills, diaphoresis and fever.   Respiratory:  Negative for shortness of breath.    Cardiovascular:  Negative for chest pain.   Gastrointestinal:  Negative for abdominal pain, nausea and vomiting.   Genitourinary:  Positive for dysuria, frequency and urgency. Negative for flank pain and hematuria.   Musculoskeletal:  Negative for back pain and myalgias.   Psychiatric/Behavioral:  Negative for confusion.          Current Medications       Current Outpatient Medications:     cephalexin (KEFLEX) 500 mg capsule, Take 1 capsule (500 mg total) by mouth every 12 (twelve) hours for 5 days, Disp: 10 capsule, Rfl: 0    norethindrone-ethinyl estradiol (NORTREL 1-35 TAB) 1-35 MG-MCG per tablet, Take 1 tablet by mouth daily, Disp: 21 tablet, Rfl: 16    sertraline (ZOLOFT) 100 mg tablet, Take 200 mg by mouth daily 2 100mg per day., Disp: , Rfl:     sertraline (ZOLOFT) 50 mg tablet, , Disp: , Rfl:     traZODone (DESYREL) 50 mg tablet, Take 50 mg by mouth, Disp: , Rfl:     albuterol (PROVENTIL HFA,VENTOLIN HFA) 90 mcg/act inhaler, Inhale 2 puffs every 4 - 6 hours as needed (Patient not taking: Reported on 2/21/2024), Disp: , Rfl:     Calcium 500-100 MG-UNIT CHEW, Chew (Patient not taking: Reported on 2/21/2024), Disp: , Rfl:     hydrocortisone 2.5 % ointment, Apply topically 2 (two) times a day (Patient not taking: Reported on 2/21/2024), Disp: 30 g, Rfl: 2    hydrOXYzine HCL (ATARAX) 10 mg tablet, Take 10-20 mg by mouth daily at bedtime (Patient not taking: Reported on 2/21/2024), Disp: , Rfl:     loratadine (CLARITIN) 10 mg tablet, Take 1 tablet (10 mg total) by mouth daily (Patient not taking: Reported on 2/21/2024), Disp: 30 tablet, Rfl: 5    Multiple Vitamin (multivitamin) tablet, Take 1 tablet by mouth daily (Patient not taking: Reported on 2/21/2024), Disp: , Rfl:     SUMAtriptan (IMITREX) 5 MG/ACT nasal spray, 1 spray (5 mg total) into each nostril every 2 (two) hours as needed for  migraine ; if headache returns/persists, may repeat dose once after 2 hours, Disp: 6 each, Rfl: 5    Current Allergies     Allergies as of 2024 - Reviewed 2024   Allergen Reaction Noted    Other Hives 2021            The following portions of the patient's history were reviewed and updated as appropriate: allergies, current medications, past family history, past medical history, past social history, past surgical history and problem list.     Past Medical History:   Diagnosis Date    Anxiety     Morbid obesity with BMI of 50.0-59.9, adult (HCC)     Urinary tract infection        Past Surgical History:   Procedure Laterality Date     SECTION   and     AR LAPS GSTR RSTCV PX W/BYP CECILE-EN-Y LIMB <150 CM N/A 2022    Procedure: LAPAROSCOPIC CECILE-EN-Y GASTRIC BYPASS AND INTRAOPERATIVE EGD;  Surgeon: Quan Eaton MD;  Location: [facility] OR;  Service: Bariatrics       Family History   Problem Relation Age of Onset    Lung cancer Mother     Obesity Mother     Cancer Mother         Passed away in  of lung cancer    Thyroid disease Father     Obesity Father     Obesity Sister     Diabetes Neg Hx     Stroke Neg Hx     Heart disease Neg Hx          Medications have been verified.        Objective   /70   Pulse 96   Temp (!) 97.2 °F (36.2 °C)   Resp 18   Wt 86.6 kg (191 lb)   LMP  (LMP Unknown)   SpO2 97%   BMI 37.30 kg/m²        Physical Exam     Physical Exam  Vitals and nursing note reviewed.   Constitutional:       General: She is not in acute distress.     Appearance: Normal appearance. She is not ill-appearing.   HENT:      Head: Normocephalic and atraumatic.   Cardiovascular:      Rate and Rhythm: Normal rate and regular rhythm.      Heart sounds: Normal heart sounds.   Pulmonary:      Effort: Pulmonary effort is normal. No respiratory distress.      Breath sounds: Normal breath sounds. No wheezing, rhonchi or rales.   Abdominal:      General: Abdomen is  flat.      Palpations: Abdomen is soft.      Tenderness: There is no abdominal tenderness. There is no right CVA tenderness, left CVA tenderness or guarding.   Skin:     General: Skin is warm and dry.      Capillary Refill: Capillary refill takes less than 2 seconds.   Neurological:      Mental Status: She is alert and oriented to person, place, and time.   Psychiatric:         Behavior: Behavior normal.

## 2024-04-10 LAB
BACTERIA UR CULT: NORMAL
Lab: NORMAL

## 2024-04-26 ENCOUNTER — OFFICE VISIT (OUTPATIENT)
Dept: UROLOGY | Facility: CLINIC | Age: 29
End: 2024-04-26
Payer: COMMERCIAL

## 2024-04-26 VITALS
BODY MASS INDEX: 36.52 KG/M2 | OXYGEN SATURATION: 99 % | SYSTOLIC BLOOD PRESSURE: 110 MMHG | DIASTOLIC BLOOD PRESSURE: 70 MMHG | HEIGHT: 60 IN | WEIGHT: 186 LBS | HEART RATE: 87 BPM

## 2024-04-26 DIAGNOSIS — N20.0 KIDNEY STONE: ICD-10-CM

## 2024-04-26 DIAGNOSIS — R31.0 GROSS HEMATURIA: Primary | ICD-10-CM

## 2024-04-26 LAB
SL AMB  POCT GLUCOSE, UA: NORMAL
SL AMB LEUKOCYTE ESTERASE,UA: NORMAL
SL AMB POCT BILIRUBIN,UA: NORMAL
SL AMB POCT BLOOD,UA: NORMAL
SL AMB POCT CLARITY,UA: CLEAR
SL AMB POCT COLOR,UA: NORMAL
SL AMB POCT KETONES,UA: NORMAL
SL AMB POCT NITRITE,UA: NORMAL
SL AMB POCT PH,UA: 6
SL AMB POCT SPECIFIC GRAVITY,UA: 1030
SL AMB POCT URINE PROTEIN: NORMAL
SL AMB POCT UROBILINOGEN: NORMAL

## 2024-04-26 PROCEDURE — 99204 OFFICE O/P NEW MOD 45 MIN: CPT | Performed by: UROLOGY

## 2024-04-26 PROCEDURE — 81002 URINALYSIS NONAUTO W/O SCOPE: CPT | Performed by: UROLOGY

## 2024-04-26 NOTE — PROGRESS NOTES
"Fay Alexandra is a(n) 29 y.o. female. , :  1995    Subjective     Assessment:  The primary encounter diagnosis was Gross hematuria. A diagnosis of Kidney stone was also pertinent to this visit.  29 F with hematuria and kidney stone by hx.  Recent UTI, negative UA today. Wants to get imaging and arrange for stoen surgery when school is out.  Likely second week of .    Plan:  KUB now, arrange for left CRUSH, stent with string.     History  Left kidney stones on CT at [facility].  Will need KUB at [facility].    Prior Visits  Urgent care 24  Patient is a 28-year-old female presenting out of concern for UTI x 2 weeks. States she has been seen in the ER twice for similar symptoms within the last 2 weeks and diagnosed with a kidney infection. Was told she also had a UTI but was only given 1 dose of antibiotics and was apparently not sent home with any antibiotics. She complains of dysuria, urgency, feelings of incomplete bladder emptying. Had hematuria last week but this is resolved. No fevers or vomiting. Has appointment with urology coming up in a few weeks and is on their cancellation list. Denies possibility of pregnancy or STDs.     2024 OV [place]  29 F hematuria [facility] ER. Told she had UTI and stone, given abx and discharged.  No abx afterward.  Went back and they said still infected and still stone.  CT done. 3 or 4 mm stone left kidney per patient.  I have no images to review. UA is negative today after went to urgent care. Never passed a stone. No flank pain left kidney. Hematuria gone after abx. Ureteroscopic surgery has a mild risk given the infection history. Hematuria needs to be evaluated.    Review of Systems    No results found for: \"PSA\"  No results found for: \"TESTOSTERONE\"  No components found for: \"CR\"  No results found for: \"HBA1C\"    Objective     /70 (BP Location: Left arm, Patient Position: Sitting, Cuff Size: Standard)   Pulse 87   Ht 5' (1.524 m)   Wt 84.4 kg (186 lb)  "  LMP  (LMP Unknown)   SpO2 99%   BMI 36.33 kg/m²     Physical Exam      Son Edna, [facility]

## 2024-04-26 NOTE — PROGRESS NOTES
"Fay Alexandra is a(n) 29 y.o. female. , :  1995    Subjective     Assessment:  The encounter diagnosis was Gross hematuria.  ***    Plan:  ***   Radiology  *** {xrays:77561}     History  ***    Prior Visits  ***    2024 NELY Bishop  ***    Review of Systems    No results found for: \"PSA\"  No results found for: \"TESTOSTERONE\"  No components found for: \"CR\"  No results found for: \"HBA1C\"    Objective     /70 (BP Location: Left arm, Patient Position: Sitting, Cuff Size: Standard)   Pulse 87   Ht 5' (1.524 m)   Wt 84.4 kg (186 lb)   LMP  (LMP Unknown)   SpO2 99%   BMI 36.33 kg/m²     Physical Exam      [facility]  "

## 2024-04-29 ENCOUNTER — HOSPITAL ENCOUNTER (OUTPATIENT)
Dept: RADIOLOGY | Facility: HOSPITAL | Age: 29
Discharge: HOME | End: 2024-04-29
Payer: COMMERCIAL

## 2024-04-29 DIAGNOSIS — N20.0 KIDNEY STONE: ICD-10-CM

## 2024-04-29 DIAGNOSIS — R31.0 GROSS HEMATURIA: ICD-10-CM

## 2024-04-29 PROCEDURE — 74018 RADEX ABDOMEN 1 VIEW: CPT

## 2024-05-01 ENCOUNTER — NURSE TRIAGE (OUTPATIENT)
Dept: UROLOGY | Facility: CLINIC | Age: 29
End: 2024-05-01

## 2024-05-01 ENCOUNTER — PREP FOR PROCEDURE (OUTPATIENT)
Dept: UROLOGY | Facility: CLINIC | Age: 29
End: 2024-05-01

## 2024-05-01 DIAGNOSIS — R31.0 GROSS HEMATURIA: ICD-10-CM

## 2024-05-01 DIAGNOSIS — N20.0 CALCULUS OF KIDNEY: Primary | ICD-10-CM

## 2024-05-01 RX ORDER — SODIUM CHLORIDE, SODIUM LACTATE, POTASSIUM CHLORIDE, CALCIUM CHLORIDE 600; 310; 30; 20 MG/100ML; MG/100ML; MG/100ML; MG/100ML
125 INJECTION, SOLUTION INTRAVENOUS CONTINUOUS
OUTPATIENT
Start: 2024-05-01

## 2024-05-01 NOTE — TELEPHONE ENCOUNTER
Put in for left ureteroscopy. Also needs right retrograde but once order set is signed in the prep for procedure, not sure how to get back and add that. Thanks.

## 2024-05-01 NOTE — TELEPHONE ENCOUNTER
Spoke with patient and confirmed surgery date of:5/22/24  Type of surgery:#5 left   Operating physician: Dr. Bishop  Location of surgery: [facility]     Verbally went over prep with patient on: 5/1/24  NPO  Bowel prep? No  Hospital calls afternoon prior with arrival time -Calls Friday afternoon for Monday surgeries  Patient needs ride to and from surgery (outpatient/inpatient)   Pre-op testing to be done 2 weeks prior to surgery  Urine culture   Blood thinners:   none  Clearances needed: none     Mailed/emailed to patient on:5/3/24  Copy of packet scanned into Media on:5/3/24  Labs in packet  Soap / Bowel prep in packet  Pre-op & Post-op in packet  Dates of H&P and post-op if needed    Consent:on admit

## 2024-05-06 NOTE — PATIENT INSTRUCTIONS
Get xray now and we will arrange for treatment of left kidney stone.  Will check for cause of blood in urine by instilling contrast to each kidney also.

## 2024-05-09 NOTE — TELEPHONE ENCOUNTER
Reason for Disposition   The patient has severe abdominal pain which is not improving    Answer Questions - Initial Assessment  When did the pain start: Ongoing since Monday    Where is the pain located:suprapubic    Does the pain radiate anywhere: pain radiates    Can you qualify the pain: constant If standing, if laying or sitting, it is mild. Dull, aching. Unless moving then feels sudden, sharp pain.     Pain scale: 7    Do you have nausea or vomiting: nausea    Do you have a fever of 101 or higher: No or low grade fever (<100°F)    Do you have any urinary symptoms: yes, frequency     Are your bowel movements regular: yes    Have you been prescribed any medication for this problem: yes when in ER received percocet, but has not taken because of work, inquiring something that is not a narcotic.      Has tried Tylenol 500mg without relief.    Protocols used: Bladder / Pelvic Pain  Reviewed bladder irritants to avoid, and to stay hydrated with at least 64 oz. Of water/day as long as no fluid restrictions. ED precautions reviewed as well.

## 2024-05-10 NOTE — TELEPHONE ENCOUNTER
Reggie Lowery PA-C       Please obtain updated urine testing.  If stone is obstructing pain could be from that.  Please review ER parameters with patient       Call placed to patient and spoke with her. Informed her of the AP recommendations. She is aware and will proceed with urine testing as advised. ER precautions were reviewed with pt and she verbalized understanding.

## 2024-05-11 ENCOUNTER — APPOINTMENT (OUTPATIENT)
Dept: LAB | Facility: HOSPITAL | Age: 29
End: 2024-05-11
Attending: UROLOGY
Payer: COMMERCIAL

## 2024-05-11 ENCOUNTER — APPOINTMENT (OUTPATIENT)
Dept: LAB | Facility: HOSPITAL | Age: 29
End: 2024-05-11
Attending: PATHOLOGY
Payer: COMMERCIAL

## 2024-05-11 DIAGNOSIS — Z00.6 ENCOUNTER FOR EXAMINATION FOR NORMAL COMPARISON OR CONTROL IN CLINICAL RESEARCH PROGRAM: ICD-10-CM

## 2024-05-11 DIAGNOSIS — R39.89 SUSPECTED UTI: ICD-10-CM

## 2024-05-11 PROCEDURE — 36415 COLL VENOUS BLD VENIPUNCTURE: CPT

## 2024-05-11 PROCEDURE — 87086 URINE CULTURE/COLONY COUNT: CPT

## 2024-05-12 LAB — BACTERIA UR CULT: NORMAL

## 2024-05-13 DIAGNOSIS — N94.89 MENSTRUATION, SUPPRESSION: ICD-10-CM

## 2024-05-13 RX ORDER — NORETHINDRONE AND ETHINYL ESTRADIOL 1; .035 MG/1; MG/1
1 TABLET ORAL DAILY
Qty: 21 TABLET | Refills: 16 | Status: SHIPPED | OUTPATIENT
Start: 2024-05-13

## 2024-05-14 RX ORDER — BUPROPION HYDROCHLORIDE 75 MG/1
TABLET ORAL
COMMUNITY
Start: 2024-05-02

## 2024-05-14 RX ORDER — TAMSULOSIN HYDROCHLORIDE 0.4 MG/1
CAPSULE ORAL
COMMUNITY
Start: 2024-03-19 | End: 2024-05-14

## 2024-05-14 RX ORDER — OXYCODONE HYDROCHLORIDE AND ACETAMINOPHEN 5; 325 MG/1; MG/1
TABLET ORAL
COMMUNITY
Start: 2024-04-04

## 2024-05-14 RX ORDER — OXCARBAZEPINE 300 MG/1
TABLET, FILM COATED ORAL
COMMUNITY
Start: 2024-04-05

## 2024-05-14 NOTE — PRE-PROCEDURE INSTRUCTIONS
Pre-Surgery Instructions:   Medication Instructions    buPROPion (WELLBUTRIN) 75 mg tablet Take day of surgery.    hydrocortisone 2.5 % ointment Stop taking 1 day prior to surgery.    loratadine (CLARITIN) 10 mg tablet Hold day of surgery.    Multiple Vitamin (multivitamin) tablet Stop taking 7 days prior to surgery.    Nortrel 1/35, 21, 1-35 MG-MCG per tablet Take day of surgery.    OXcarbazepine (TRILEPTAL) 300 mg tablet Take day of surgery.    oxyCODONE-acetaminophen (PERCOCET) 5-325 mg per tablet Uses PRN- OK to take day of surgery    sertraline (ZOLOFT) 100 mg tablet Take day of surgery.    SUMAtriptan (IMITREX) 5 MG/ACT nasal spray Hold day of surgery.    traZODone (DESYREL) 50 mg tablet Take night before surgery    Medication instructions for day surgery reviewed. Please use only a sip of water to take your instructed medications. Avoid all over the counter vitamins, supplements and NSAIDS for one week prior to surgery per anesthesia guidelines. Tylenol is ok to take as needed.     You will receive a call one business day prior to surgery with an arrival time and hospital directions. If your surgery is scheduled on a Monday, the hospital will be calling you on the Friday prior to your surgery. If you have not heard from anyone by 8pm, please call the hospital supervisor through the hospital  at [phone]. ([place] [phone] or [place] [phone]).    Do not eat or drink anything after midnight the night before your surgery, including candy, mints, lifesavers, or chewing gum. Do not drink alcohol 24hrs before your surgery. Try not to smoke at least 24hrs before your surgery.       Follow the pre surgery showering instructions as listed in the “My Surgical Experience Booklet” or otherwise provided by your surgeon's office. Do not use a blade to shave the surgical area 1 week before surgery. It is okay to use a clean electric clippers up to 24 hours before surgery. Do not apply any lotions,  creams, including makeup, cologne, deodorant, or perfumes after showering on the day of your surgery. Do not use dry shampoo, hair spray, hair gel, or any type of hair products.     No contact lenses, eye make-up, or artificial eyelashes. Remove nail polish, including gel polish, and any artificial, gel, or acrylic nails if possible. Remove all jewelry including rings and body piercing jewelry.     Wear causal clothing that is easy to take on and off. Consider your type of surgery.    Keep any valuables, jewelry, piercings at home. Please bring any specially ordered equipment (sling, braces) if indicated.    Arrange for a responsible person to drive you to and from the hospital on the day of your surgery. Please confirm the visitor policy for the day of your procedure when you receive your phone call with an arrival time.     Call the surgeon's office with any new illnesses, exposures, or additional questions prior to surgery.    Please reference your “My Surgical Experience Booklet” for additional information to prepare for your upcoming surgery.

## 2024-05-22 ENCOUNTER — ANESTHESIA EVENT (OUTPATIENT)
Dept: PERIOP | Facility: HOSPITAL | Age: 29
End: 2024-05-22
Payer: COMMERCIAL

## 2024-05-22 ENCOUNTER — APPOINTMENT (OUTPATIENT)
Dept: RADIOLOGY | Facility: HOSPITAL | Age: 29
End: 2024-05-22
Payer: COMMERCIAL

## 2024-05-22 ENCOUNTER — ANESTHESIA (OUTPATIENT)
Dept: PERIOP | Facility: HOSPITAL | Age: 29
End: 2024-05-22
Payer: COMMERCIAL

## 2024-05-22 ENCOUNTER — HOSPITAL ENCOUNTER (OUTPATIENT)
Facility: HOSPITAL | Age: 29
Setting detail: OUTPATIENT SURGERY
Discharge: HOME | End: 2024-05-22
Attending: UROLOGY | Admitting: UROLOGY
Payer: COMMERCIAL

## 2024-05-22 VITALS
WEIGHT: 186.73 LBS | SYSTOLIC BLOOD PRESSURE: 102 MMHG | HEART RATE: 95 BPM | DIASTOLIC BLOOD PRESSURE: 52 MMHG | TEMPERATURE: 97.2 F | OXYGEN SATURATION: 98 % | BODY MASS INDEX: 36.47 KG/M2 | RESPIRATION RATE: 16 BRPM

## 2024-05-22 DIAGNOSIS — N20.0 CALCULUS OF KIDNEY: ICD-10-CM

## 2024-05-22 DIAGNOSIS — Z87.442 PERSONAL HISTORY OF KIDNEY STONES: Primary | ICD-10-CM

## 2024-05-22 LAB
EXT PREGNANCY TEST URINE: NEGATIVE
EXT. CONTROL: NORMAL

## 2024-05-22 PROCEDURE — 74420 UROGRAPHY RTRGR +-KUB: CPT

## 2024-05-22 PROCEDURE — C1769 GUIDE WIRE: HCPCS | Performed by: UROLOGY

## 2024-05-22 PROCEDURE — C2617 STENT, NON-COR, TEM W/O DEL: HCPCS | Performed by: UROLOGY

## 2024-05-22 PROCEDURE — 52352 CYSTOURETERO W/STONE REMOVE: CPT | Performed by: UROLOGY

## 2024-05-22 PROCEDURE — C1894 INTRO/SHEATH, NON-LASER: HCPCS | Performed by: UROLOGY

## 2024-05-22 PROCEDURE — 82360 CALCULUS ASSAY QUANT: CPT | Performed by: UROLOGY

## 2024-05-22 PROCEDURE — 88300 SURGICAL PATH GROSS: CPT | Performed by: PATHOLOGY

## 2024-05-22 PROCEDURE — 52332 CYSTOSCOPY AND TREATMENT: CPT | Performed by: UROLOGY

## 2024-05-22 PROCEDURE — NC001 PR NO CHARGE: Performed by: UROLOGY

## 2024-05-22 PROCEDURE — 81025 URINE PREGNANCY TEST: CPT | Performed by: UROLOGY

## 2024-05-22 DEVICE — INLAY URETERAL STENT W/O GUIDEWIRE
Type: IMPLANTABLE DEVICE | Site: URETER | Status: FUNCTIONAL
Brand: BARD® INLAY® URETERAL STENT

## 2024-05-22 RX ORDER — METOCLOPRAMIDE HYDROCHLORIDE 5 MG/ML
10 INJECTION INTRAMUSCULAR; INTRAVENOUS ONCE AS NEEDED
Status: DISCONTINUED | OUTPATIENT
Start: 2024-05-22 | End: 2024-05-22 | Stop reason: HOSPADM

## 2024-05-22 RX ORDER — CEFAZOLIN SODIUM 2 G/50ML
2000 SOLUTION INTRAVENOUS ONCE
Status: COMPLETED | OUTPATIENT
Start: 2024-05-22 | End: 2024-05-22

## 2024-05-22 RX ORDER — ONDANSETRON 2 MG/ML
INJECTION INTRAMUSCULAR; INTRAVENOUS AS NEEDED
Status: DISCONTINUED | OUTPATIENT
Start: 2024-05-22 | End: 2024-05-22

## 2024-05-22 RX ORDER — HYDROMORPHONE HCL/PF 1 MG/ML
0.5 SYRINGE (ML) INJECTION
Status: DISCONTINUED | OUTPATIENT
Start: 2024-05-22 | End: 2024-05-22 | Stop reason: HOSPADM

## 2024-05-22 RX ORDER — FENTANYL CITRATE/PF 50 MCG/ML
50 SYRINGE (ML) INJECTION
Status: DISCONTINUED | OUTPATIENT
Start: 2024-05-22 | End: 2024-05-22 | Stop reason: HOSPADM

## 2024-05-22 RX ORDER — PROPOFOL 10 MG/ML
INJECTION, EMULSION INTRAVENOUS AS NEEDED
Status: DISCONTINUED | OUTPATIENT
Start: 2024-05-22 | End: 2024-05-22

## 2024-05-22 RX ORDER — ONDANSETRON 2 MG/ML
4 INJECTION INTRAMUSCULAR; INTRAVENOUS ONCE AS NEEDED
Status: DISCONTINUED | OUTPATIENT
Start: 2024-05-22 | End: 2024-05-22 | Stop reason: HOSPADM

## 2024-05-22 RX ORDER — MAGNESIUM HYDROXIDE 1200 MG/15ML
LIQUID ORAL AS NEEDED
Status: DISCONTINUED | OUTPATIENT
Start: 2024-05-22 | End: 2024-05-22 | Stop reason: HOSPADM

## 2024-05-22 RX ORDER — DEXAMETHASONE SODIUM PHOSPHATE 10 MG/ML
INJECTION, SOLUTION INTRAMUSCULAR; INTRAVENOUS AS NEEDED
Status: DISCONTINUED | OUTPATIENT
Start: 2024-05-22 | End: 2024-05-22

## 2024-05-22 RX ORDER — GLYCOPYRROLATE 0.2 MG/ML
INJECTION INTRAMUSCULAR; INTRAVENOUS AS NEEDED
Status: DISCONTINUED | OUTPATIENT
Start: 2024-05-22 | End: 2024-05-22

## 2024-05-22 RX ORDER — SODIUM CHLORIDE, SODIUM LACTATE, POTASSIUM CHLORIDE, CALCIUM CHLORIDE 600; 310; 30; 20 MG/100ML; MG/100ML; MG/100ML; MG/100ML
125 INJECTION, SOLUTION INTRAVENOUS CONTINUOUS
Status: DISCONTINUED | OUTPATIENT
Start: 2024-05-22 | End: 2024-05-22 | Stop reason: HOSPADM

## 2024-05-22 RX ORDER — FENTANYL CITRATE 50 UG/ML
INJECTION, SOLUTION INTRAMUSCULAR; INTRAVENOUS AS NEEDED
Status: DISCONTINUED | OUTPATIENT
Start: 2024-05-22 | End: 2024-05-22

## 2024-05-22 RX ORDER — MIDAZOLAM HYDROCHLORIDE 2 MG/2ML
INJECTION, SOLUTION INTRAMUSCULAR; INTRAVENOUS AS NEEDED
Status: DISCONTINUED | OUTPATIENT
Start: 2024-05-22 | End: 2024-05-22

## 2024-05-22 RX ADMIN — GLYCOPYRROLATE 0.1 MG: 0.2 INJECTION, SOLUTION INTRAMUSCULAR; INTRAVENOUS at 09:50

## 2024-05-22 RX ADMIN — FENTANYL CITRATE 25 MCG: 50 INJECTION, SOLUTION INTRAMUSCULAR; INTRAVENOUS at 10:20

## 2024-05-22 RX ADMIN — FENTANYL CITRATE 25 MCG: 50 INJECTION, SOLUTION INTRAMUSCULAR; INTRAVENOUS at 10:08

## 2024-05-22 RX ADMIN — CEFAZOLIN SODIUM 2000 MG: 2 SOLUTION INTRAVENOUS at 10:07

## 2024-05-22 RX ADMIN — FENTANYL CITRATE 25 MCG: 50 INJECTION, SOLUTION INTRAMUSCULAR; INTRAVENOUS at 10:33

## 2024-05-22 RX ADMIN — MIDAZOLAM 2 MG: 1 INJECTION INTRAMUSCULAR; INTRAVENOUS at 09:50

## 2024-05-22 RX ADMIN — PROPOFOL 170 MG: 10 INJECTION, EMULSION INTRAVENOUS at 10:00

## 2024-05-22 RX ADMIN — DEXAMETHASONE SODIUM PHOSPHATE 10 MG: 10 INJECTION, SOLUTION INTRAMUSCULAR; INTRAVENOUS at 10:00

## 2024-05-22 RX ADMIN — ONDANSETRON 4 MG: 2 INJECTION INTRAMUSCULAR; INTRAVENOUS at 10:00

## 2024-05-22 RX ADMIN — SODIUM CHLORIDE, SODIUM LACTATE, POTASSIUM CHLORIDE, AND CALCIUM CHLORIDE: .6; .31; .03; .02 INJECTION, SOLUTION INTRAVENOUS at 09:56

## 2024-05-22 RX ADMIN — SODIUM CHLORIDE, SODIUM LACTATE, POTASSIUM CHLORIDE, AND CALCIUM CHLORIDE: .6; .31; .03; .02 INJECTION, SOLUTION INTRAVENOUS at 09:00

## 2024-05-22 RX ADMIN — FENTANYL CITRATE 25 MCG: 50 INJECTION, SOLUTION INTRAMUSCULAR; INTRAVENOUS at 10:52

## 2024-05-22 NOTE — ANESTHESIA POSTPROCEDURE EVALUATION
Post-Op Assessment Note    CV Status:  Stable  Pain Score: 0    Pain management: adequate       Mental Status:  Alert and awake   Hydration Status:  Euvolemic   PONV Controlled:  Controlled   Airway Patency:  Patent     Post Op Vitals Reviewed: Yes    No anethesia notable event occurred.    Staff: JES               /64 (05/22/24 1055)    Temp (!) 97.2 °F (36.2 °C) (05/22/24 1055)    Pulse 91 (05/22/24 1055)   Resp 22 (05/22/24 1055)    SpO2 92 % (05/22/24 1055)

## 2024-05-22 NOTE — ANESTHESIA PREPROCEDURE EVALUATION
Procedure:  CYSTOSCOPY URETEROSCOPY WITH LITHOTRIPSY HOLMIUM LASER, RETROGRADE PYELOGRAM AND INSERTION STENT URETERAL (Left: Bladder)  CYSTOSCOPY RETROGRADE PYELOGRAM (Right: Bladder)    Relevant Problems   CARDIO   (+) Chronic migraine without aura without status migrainosus, not intractable   (+) HLD (hyperlipidemia)      GI/HEPATIC   (+) Gastroesophageal reflux disease      /RENAL   (+) Kidney stone      NEURO/PSYCH   (+) Anxiety   (+) Chronic migraine without aura without status migrainosus, not intractable      PULMONARY   (+) Obstructive sleep apnea syndrome in adult        Physical Exam    Airway    Mallampati score: II  TM Distance: >3 FB  Neck ROM: full     Dental   No notable dental hx     Cardiovascular  Cardiovascular exam normal    Pulmonary  Pulmonary exam normal     Other Findings  post-pubertal.      Anesthesia Plan  ASA Score- 3     Anesthesia Type- general with ASA Monitors.         Additional Monitors:     Airway Plan: LMA.           Plan Factors-Exercise tolerance (METS): >4 METS.    Chart reviewed.   Existing labs reviewed. Patient summary reviewed.    Patient is not a current smoker.      Obstructive sleep apnea risk education given perioperatively.        Induction-     Postoperative Plan-     Perioperative Resuscitation Plan - Level 1 - Full Code.       Informed Consent- Anesthetic plan and risks discussed with patient.  I personally reviewed this patient with the CRNA. Discussed and agreed on the Anesthesia Plan with the CRNA..

## 2024-05-22 NOTE — H&P
History & Physical - [facility]  Fay Alexandra 29 y.o. female MRN: [id number]  Unit/Bed#: OR POOL Encounter: [id number]    ASSESSMENT  Hematuria left kidney stones    PLAN:  Cystoscopy with bilateral retrogrades and left ureteroscopy with laser lithotripsy and stent placement.  Will leave Dangler string.  Patient agreeable.  Understands risks and benefits as discussed.  Consent signed.    HISTORY OF PRESENT ILLNESS:  29-year-old woman with gross hematuria and left kidney stones.  CT imaging was done at [facility].  KUB shows stones.    PAST MEDICAL HISTORY:  Past Medical History:   Diagnosis Date    Anxiety     Depression     Eczema     Kidney stone     Morbid obesity with BMI of 50.0-59.9, adult (HCC)     Urinary tract infection        PAST SURGICAL HISTORY:  Past Surgical History:   Procedure Laterality Date     SECTION   and     EGD      SD LAPS GSTR RSTCV PX W/BYP CECILE-EN-Y LIMB <150 CM N/A 2022    Procedure: LAPAROSCOPIC CECILE-EN-Y GASTRIC BYPASS AND INTRAOPERATIVE EGD;  Surgeon: Quan Eaton MD;  Location: [facility];  Service: Bariatrics       ALLERGIES:  Allergies   Allergen Reactions    Other Hives     SOME shellfish        SOCIAL HISTORY:  Social History     Substance and Sexual Activity   Alcohol Use Not Currently    Comment: I will have a drink 1-2 times a year     Social History     Substance and Sexual Activity   Drug Use Yes    Types: Marijuana    Comment: for migraine     Social History     Tobacco Use   Smoking Status Former    Current packs/day: 0.00    Average packs/day: 0.3 packs/day for 5.0 years (1.3 ttl pk-yrs)    Types: Cigarettes    Start date: 6/10/2011    Quit date: 6/10/2016    Years since quittin.9   Smokeless Tobacco Never   Tobacco Comments    I smoked on and off not 5 years straight       FAMILY HISTORY:  Family History   Problem Relation Age of Onset    Lung cancer Mother     Obesity Mother     Cancer Mother         Passed  "away in 2006 of lung cancer    Thyroid disease Father     Obesity Father     Obesity Sister     Diabetes Neg Hx     Stroke Neg Hx     Heart disease Neg Hx        MEDICATIONS:    Current Facility-Administered Medications:     ceFAZolin (ANCEF) IVPB (premix in dextrose) 2,000 mg 50 mL, 2,000 mg, Intravenous, Once, Son Bishop MD    lactated ringers infusion, 125 mL/hr, Intravenous, Continuous, Son Bishop MD    Review of Systems   Constitutional:  Negative for chills and fever.   Respiratory:  Negative for cough and wheezing.    Cardiovascular:  Negative for chest pain.   Gastrointestinal:  Negative for nausea.   Genitourinary:  Positive for hematuria. Negative for difficulty urinating and flank pain.       PHYSICAL EXAM:  Physical Exam  Constitutional:       Appearance: Normal appearance.   HENT:      Head: Normocephalic.   Cardiovascular:      Rate and Rhythm: Normal rate.      Pulses: Normal pulses.   Pulmonary:      Effort: Pulmonary effort is normal.   Abdominal:      Palpations: Abdomen is soft.      Tenderness: There is no right CVA tenderness or left CVA tenderness.   Musculoskeletal:         General: No swelling.   Skin:     General: Skin is warm and dry.      Coloration: Skin is not jaundiced.   Neurological:      General: No focal deficit present.      Mental Status: She is alert and oriented to person, place, and time.   Psychiatric:         Mood and Affect: Mood normal.         LAB RESULTS:  Lab Results   Component Value Date    WBC 10.71 (H) 02/21/2024    HGB 12.5 02/21/2024    HCT 38.0 02/21/2024    MCV 85 02/21/2024     (H) 02/21/2024     Lab Results   Component Value Date    SODIUM 138 02/21/2024    K 4.2 02/21/2024     02/21/2024    CO2 27 02/21/2024    BUN 8 02/21/2024    CREATININE 0.73 02/21/2024    GLUC 80 02/21/2024    CALCIUM 8.5 02/21/2024     Lab Results   Component Value Date    CALCIUM 8.5 02/21/2024     No results found for: \"PSA\"    OTHER STUDIES:  KUB " "4/29/2024  1. Unremarkable bowel gas pattern. Postoperative change.  2. Suspected 3 to 4 mm calcification overlying the left renal fossa.    Son Bishop MD  05/22/24    Portions of the record may have been created with voice recognition software.  Occasional wrong word or \"sound alike\" substitutions may have occurred due to the inherent limitations of voice recognition software.  Read the chart carefully and recognize, using context, where substitutions have occurred.  "

## 2024-05-22 NOTE — OP NOTE
OPERATIVE REPORT- Dr. Bishop  PATIENT NAME: Fay Alexandra    :  1995  MRN: [id number]  Pt Location: [facility] OR ROOM 04    SURGERY DATE: 2024    Surgeon: Son Bishop MD    Pre-op Diagnosis:  Left kidney stone  Gross hematuria    Post-op Diagnosis:  Same    Procedure:  Cystoscopy, fluoroscopy, bilateral retrograde pyelography, left ureteroscopy with stone extraction and left stent placement with string    Specimen(s):   ID Type Source Tests Collected by Time Destination   A : left kidney calculi [facility], Left STONE ANALYSIS Son Bishop MD 2024 1042        Estimated Blood Loss: Minimal    Complications: None    Drains: 6 [place] 24 cm left ureteral stent with string    Anesthesia type: Gen LMA     Indications for surgery: 29-year-old woman who was previously emergency department for gross hematuria.  She had previously been seen at [facility] and had a CT done which suggested a stone in the left kidney.  Imaging was never transferred over to [facility] but a KUB was obtained in late April which suggested a 4 mm stone in the left kidney.  Patient is being taken to the operating room for cystoscopy with retrogrades and ureteroscopy with removal of left kidney stone.    Findings: No bladder tumors or bladder stones.  Left ureter was normal.  No filling defects.  1 small stone left kidney.  Easily removed with a basket.  No other visible stones.    Procedure and Technique:   After obtaining consent and identifying the patient, antibiotics were given as ordered and the patient was brought to the room.  All appropriate leads and monitors were placed and the patient was appropriately positioned on the table.  Anesthesia was administered and the patient was sterilely prepped and draped.  A timeout was performed where the patient name, , procedure, antibiotics, allergies, etc. were discussed.  All in the room were satisfied before the start of the operative  "procedure.  What follows are the operative findings and events.     Cystoscopy was undertaken.  Findings noted above.  Retrogrades performed bilaterally.  No filling defects.  Left kidney stone was removed with a basket.  Stent was placed with a good coil proximally and distally.  String left on the end of the stent and secured to the mons with Mastisol and Steri-Strips.  The procedure was terminated and the patient was awakened without incident and transferred to the PACU in satisfactory condition.    Plan:  1.  Stent removal in 2 or 3 days.    SIGNATURE: Son Bishop MD  DATE: May 22, 2024  TIME: 10:57 AM    Portions of the record may have been created with voice recognition software.  Occasional wrong word or \"sound alike\" substitutions may have occurred due to the inherent limitations of voice recognition software.  Read the chart carefully and recognize, using context, where substitutions have occurred.  "

## 2024-05-22 NOTE — DISCHARGE INSTR - AVS FIRST PAGE
There was just 1 stone in the left kidney which was removed.  It was small.  Without the benefit of having seen the CT it is hard to know if there was anything larger in the kidney, but there was an exhaustive search for other stones.  Stent was placed.  String is attached and the stent can be removed in 2 or 3 days.

## 2024-05-23 ENCOUNTER — NURSE TRIAGE (OUTPATIENT)
Age: 29
End: 2024-05-23

## 2024-05-23 DIAGNOSIS — N20.0 KIDNEY STONE: Primary | ICD-10-CM

## 2024-05-23 NOTE — TELEPHONE ENCOUNTER
Patient of Dr. Bishop, last seen yesterday for surgery, stone removal and stent    Patient calling in with c/o of pain 7 out of 10 on a pain level    She states she can't take NSAID, tylenol not effective. Took percocert but not fully effective that was left over from last ED visit.    Discussed a warm bath and ice packs, encourage to continue to take Tylenol regularly. Did discuss some of her sxs are to be expected    Reviewed ED precautions    Please advise if any other recommendations   Additional Information   Negative: The patient has severe uncontrolled pain   Negative: The patient has a fever of 101 or higher   Negative: The patient has persistent vomiting    Answer Questions - Initial Assessment   When did this pain start: yesterday after surgery     Where is your pain: bladder    Is your pain on the left, right, or both sides: unable to tell    Does your pain radiate anywhere: pain does not radiate    Can you qualify the pain: constant , sharp, and aching    Do you have nausea or vomiting: denies    Do you have a fever 101 or higher: No    Do you have any urinary symptoms: yes frequency, small amount of output    Do you have a history of kidney stones or UTI's: yes    Have you had recent urologic procedure or surgery: yes    Protocols used: FLANK PAIN / KIDNEY STONES / HYDRONEPHROSIS

## 2024-05-24 RX ORDER — OXYBUTYNIN CHLORIDE 5 MG/1
5 TABLET ORAL 3 TIMES DAILY
Qty: 30 TABLET | Refills: 0 | Status: SHIPPED | OUTPATIENT
Start: 2024-05-24

## 2024-05-24 RX ORDER — PHENAZOPYRIDINE HYDROCHLORIDE 200 MG/1
200 TABLET, FILM COATED ORAL
Qty: 10 TABLET | Refills: 0 | Status: SHIPPED | OUTPATIENT
Start: 2024-05-24

## 2024-05-24 NOTE — TELEPHONE ENCOUNTER
Patient called to confirm if Rx's sent over to pharmacy, [facility] [place]

## 2024-05-24 NOTE — TELEPHONE ENCOUNTER
Spoke to pt and advised:    IKER Montero2 hours ago (8:11 AM)     Can you please call Varinder and let her know that I sent a couple of prescriptions over to her pharmacy for the pain and discomfort.  Oxybutynin is for bladder spasms which may help and relieve discomfort from the stent, Pyridium is to help numb the urinary tract which also may help in her discomfort.  She should take Tylenol 1000 mg every 6 hours and use the oxycodone if needed.

## 2024-05-24 NOTE — TELEPHONE ENCOUNTER
Can you please call Varinder and let her know that I sent a couple of prescriptions over to her pharmacy for the pain and discomfort.  Oxybutynin is for bladder spasms which may help and relieve discomfort from the stent, Pyridium is to help numb the urinary tract which also may help in her discomfort.  She should take Tylenol 1000 mg every 6 hours and use the oxycodone if needed.

## 2024-05-24 NOTE — TELEPHONE ENCOUNTER
Post Op Note    Fay Alexandra is a 29 y.o. female s/p CYSTOSCOPY, URETEROSCOPY, BILATERAL RETROGRADE PYELOGRAM, BASKET STONE REMOVAL, AND INSERTION STENT URETERAL (Left: Bladder)       CYSTOSCOPY RETROGRADE PYELOGRAM (Right: Bladder)  performed 5/22/24.  Fay Alexandra is a patient of Dr. Dr. Bishop and is seen at the [place] office.     Plan per OP note:  Plan:  1.  Stent removal in 2 or 3 days.    Called and spoke with patient at this time. She did not yet begin oxybutynin or pyridium. Reviewed what each medication is for and that she should take both over the weekend as needed. Stent removal instructions reviewed, plan to remove tomorrow.     Plan for follow up is 6 months with KUB. Patient declined scheduling at this time and asked if we could call back 'closer to appt.' Reviewed we would call in a couple months. She had no further questions or concerns.     Please postpone task and call patient in July/Aug for 6 month follow up (November) with KUB prior, order in place.

## 2024-05-28 LAB
APOB+LDLR+PCSK9 GENE MUT ANL BLD/T: NOT DETECTED
BRCA1+BRCA2 DEL+DUP + FULL MUT ANL BLD/T: NOT DETECTED
MLH1+MSH2+MSH6+PMS2 GN DEL+DUP+FUL M: NOT DETECTED

## 2024-05-28 PROCEDURE — 88300 SURGICAL PATH GROSS: CPT | Performed by: PATHOLOGY

## 2024-06-03 LAB
CALCIUM OXALATE DIHYDRATE MFR STONE IR: 40 %
COLOR STONE: NORMAL
COM MFR STONE: 60 %
COMMENT-STONE3: NORMAL
COMPOSITION: NORMAL
LABORATORY COMMENT REPORT: NORMAL
PHOTO: NORMAL
SIZE STONE: NORMAL MM
SPEC SOURCE SUBJ: NORMAL
STONE ANALYSIS-IMP: NORMAL
STONE ANALYSIS-IMP: NORMAL
WT STONE: 2 MG

## 2024-06-24 ENCOUNTER — APPOINTMENT (OUTPATIENT)
Dept: RADIOLOGY | Facility: CLINIC | Age: 29
End: 2024-06-24
Payer: COMMERCIAL

## 2024-06-24 ENCOUNTER — OFFICE VISIT (OUTPATIENT)
Dept: URGENT CARE | Facility: CLINIC | Age: 29
End: 2024-06-24
Payer: COMMERCIAL

## 2024-06-24 VITALS
OXYGEN SATURATION: 98 % | WEIGHT: 189 LBS | BODY MASS INDEX: 36.91 KG/M2 | TEMPERATURE: 97.7 F | HEART RATE: 85 BPM | RESPIRATION RATE: 14 BRPM

## 2024-06-24 DIAGNOSIS — S90.129A CONTUSION OF FIFTH TOE: Primary | ICD-10-CM

## 2024-06-24 DIAGNOSIS — S99.921A INJURY OF RIGHT TOE, INITIAL ENCOUNTER: ICD-10-CM

## 2024-06-24 PROCEDURE — 73660 X-RAY EXAM OF TOE(S): CPT

## 2024-06-24 PROCEDURE — 99204 OFFICE O/P NEW MOD 45 MIN: CPT | Performed by: PHYSICIAN ASSISTANT

## 2024-06-25 NOTE — PROGRESS NOTES
[facility]        NAME: Fay Alexandra is a 29 y.o. female  : 1995    MRN: [id number]  DATE: 2024  TIME: 8:10 PM    Assessment and Plan   Contusion of fifth toe [S90.129A]  1. Contusion of fifth toe  XR toe right fifth min 2 views        XR with no acute osseous abnormality per my preliminary read, pending official radiology report.  Recommend continue supportive care.  If no improvement or any worsening should see podiatry. Discussed strict return to care precautions as well as red flag symptoms which should prompt immediate ED referral. Pt verbalized understanding and is in agreement with plan.  Please follow up with your primary care provider within the next week. Please remember that your visit today was with an urgent care provider and should not replace follow up with your primary care provider for chronic medical issues or annual physicals.       Patient Instructions       Follow up with PCP in 3-5 days.  Proceed to  ER if symptoms worsen.    If tests are performed, our office will contact you with results only if changes need to made to the care plan discussed with you at the visit. [facility]    Chief Complaint     Chief Complaint   Patient presents with    Toe Injury     Pt presents with injury of the right fifth toe r/t closing door on to foot one week ago         History of Present Illness       Pt is a 30 yo female pw R 5th toe pain x 1 week after closing it in a door.  States swelling and bruising have improved but she continues to have worsening pain.  Denies numbness or tingling.  Taking Tylenol with no relief.  Hurts to wear shoes.        Review of Systems   Review of Systems   Constitutional:  Negative for chills and diaphoresis.   Respiratory:  Negative for shortness of breath.    Cardiovascular:  Negative for chest pain.   Gastrointestinal:  Negative for nausea and vomiting.   Musculoskeletal:  Positive for arthralgias.  Negative for back pain, joint swelling, myalgias and neck pain.   Neurological:  Negative for weakness and numbness.         Current Medications       Current Outpatient Medications:     buPROPion (WELLBUTRIN) 75 mg tablet, TAKE 1 TABLET BY MOUTH EVERY MORNING FOR ADHD, Disp: , Rfl:     Multiple Vitamin (multivitamin) tablet, Take 1 tablet by mouth daily, Disp: , Rfl:     Nortrel 1/35, 21, 1-35 MG-MCG per tablet, TAKE 1 TABLET BY MOUTH DAILY, Disp: 21 tablet, Rfl: 16    OXcarbazepine (TRILEPTAL) 300 mg tablet, TAKE 1 TABLET BY MOUTH TWICE DAILY FOR MOOD, Disp: , Rfl:     sertraline (ZOLOFT) 100 mg tablet, Take 200 mg by mouth daily 2 100mg per day., Disp: , Rfl:     traZODone (DESYREL) 50 mg tablet, Take 50 mg by mouth daily at bedtime as needed, Disp: , Rfl:     SUMAtriptan (IMITREX) 5 MG/ACT nasal spray, 1 spray (5 mg total) into each nostril every 2 (two) hours as needed for migraine ; if headache returns/persists, may repeat dose once after 2 hours, Disp: 6 each, Rfl: 5    Current Allergies     Allergies as of 2024 - Reviewed 2024   Allergen Reaction Noted    Other Hives 2021            The following portions of the patient's history were reviewed and updated as appropriate: allergies, current medications, past family history, past medical history, past social history, past surgical history and problem list.     Past Medical History:   Diagnosis Date    Anxiety     Depression     Eczema     Kidney stone     Morbid obesity with BMI of 50.0-59.9, adult (HCC)     Urinary tract infection        Past Surgical History:   Procedure Laterality Date     SECTION   and 2016    EGD      FL RETROGRADE PYELOGRAM  2024    MT CYSTO BLADDER W/URETERAL CATHETERIZATION Right 2024    Procedure: CYSTOSCOPY RETROGRADE PYELOGRAM;  Surgeon: Son Bishop MD;  Location: [facility];  Service: Urology    MT CYSTO/URETERO W/LITHOTRIPSY &INDWELL STENT INSRT Left 2024    Procedure:  CYSTOSCOPY, URETEROSCOPY, BILATERAL RETROGRADE PYELOGRAM, BASKET STONE REMOVAL, AND INSERTION STENT URETERAL;  Surgeon: Son Bishop MD;  Location: [facility];  Service: Urology    MD LAPS GSTR RSTCV PX W/BYP CECILE-EN-Y LIMB <150 CM N/A 07/18/2022    Procedure: LAPAROSCOPIC CECILE-EN-Y GASTRIC BYPASS AND INTRAOPERATIVE EGD;  Surgeon: Quan Eaton MD;  Location: [facility];  Service: Bariatrics       Family History   Problem Relation Age of Onset    Lung cancer Mother     Obesity Mother     Cancer Mother         Passed away in 2006 of lung cancer    Thyroid disease Father     Obesity Father     Obesity Sister     Diabetes Neg Hx     Stroke Neg Hx     Heart disease Neg Hx          Medications have been verified.        Objective   Pulse 85   Temp 97.7 °F (36.5 °C)   Resp 14   Wt 85.7 kg (189 lb)   SpO2 98%   BMI 36.91 kg/m²        Physical Exam     Physical Exam  Vitals and nursing note reviewed.   Constitutional:       General: She is not in acute distress.     Appearance: Normal appearance. She is not ill-appearing.   HENT:      Head: Normocephalic and atraumatic.   Cardiovascular:      Rate and Rhythm: Normal rate.   Pulmonary:      Effort: Pulmonary effort is normal. No respiratory distress.   Musculoskeletal:         General: Tenderness (R 5th toe. No swelling or bruising. Full ROM) present.   Skin:     General: Skin is warm and dry.      Capillary Refill: Capillary refill takes less than 2 seconds.   Neurological:      Mental Status: She is alert and oriented to person, place, and time.   Psychiatric:         Behavior: Behavior normal.

## 2024-06-26 ENCOUNTER — TELEPHONE (OUTPATIENT)
Dept: URGENT CARE | Facility: CLINIC | Age: 29
End: 2024-06-26

## 2024-06-26 NOTE — TELEPHONE ENCOUNTER
Called patient to discuss xray results. Encouraged continuing to richard tape and follow up with podiatry if needed. She verbalized understanding and is in agreement with plan.

## 2024-07-18 ENCOUNTER — APPOINTMENT (OUTPATIENT)
Dept: LAB | Facility: HOSPITAL | Age: 29
End: 2024-07-18
Payer: COMMERCIAL

## 2024-07-18 ENCOUNTER — NURSE TRIAGE (OUTPATIENT)
Age: 29
End: 2024-07-18

## 2024-07-18 ENCOUNTER — TELEPHONE (OUTPATIENT)
Dept: UROLOGY | Facility: CLINIC | Age: 29
End: 2024-07-18

## 2024-07-18 DIAGNOSIS — R35.0 URINARY FREQUENCY: ICD-10-CM

## 2024-07-18 DIAGNOSIS — R30.0 DYSURIA: Primary | ICD-10-CM

## 2024-07-18 DIAGNOSIS — R30.0 DYSURIA: ICD-10-CM

## 2024-07-18 DIAGNOSIS — R82.90 MALODOROUS URINE: ICD-10-CM

## 2024-07-18 LAB
BACTERIA UR QL AUTO: ABNORMAL /HPF
BILIRUB UR QL STRIP: NEGATIVE
CAOX CRY URNS QL MICRO: ABNORMAL /HPF
CLARITY UR: CLEAR
COLOR UR: YELLOW
GLUCOSE UR STRIP-MCNC: NEGATIVE MG/DL
HGB UR QL STRIP.AUTO: ABNORMAL
KETONES UR STRIP-MCNC: NEGATIVE MG/DL
LEUKOCYTE ESTERASE UR QL STRIP: ABNORMAL
NITRITE UR QL STRIP: NEGATIVE
NON-SQ EPI CELLS URNS QL MICRO: ABNORMAL /HPF
PH UR STRIP.AUTO: 5.5 [PH]
PROT UR STRIP-MCNC: ABNORMAL MG/DL
RBC #/AREA URNS AUTO: ABNORMAL /HPF
SP GR UR STRIP.AUTO: >=1.03 (ref 1–1.03)
UROBILINOGEN UR STRIP-ACNC: <2 MG/DL
WBC #/AREA URNS AUTO: ABNORMAL /HPF

## 2024-07-18 PROCEDURE — 87086 URINE CULTURE/COLONY COUNT: CPT

## 2024-07-18 PROCEDURE — 81001 URINALYSIS AUTO W/SCOPE: CPT

## 2024-07-18 RX ORDER — CEFDINIR 300 MG/1
300 CAPSULE ORAL EVERY 12 HOURS SCHEDULED
Qty: 14 CAPSULE | Refills: 0 | Status: SHIPPED | OUTPATIENT
Start: 2024-07-18 | End: 2024-07-25

## 2024-07-18 NOTE — TELEPHONE ENCOUNTER
Previous urine cultures negative.  Will assess later today versus tomorrow on UA to see how concern is for infection for antibiotics to be given at that point in time due to upcoming weekend and urine culture likely not resulting until over the weekend

## 2024-07-18 NOTE — TELEPHONE ENCOUNTER
Urinalysis does demonstrate leukocytes.  And bacteria.  Will send empiric course of antibiotics due to upcoming weekend and likelihood that urine culture not result before weekend.  Patient is to take it while she hears otherwise from our office

## 2024-07-18 NOTE — TELEPHONE ENCOUNTER
Patient called in with UTI symptoms of Frequency, discomfort/irritation upon urination, malodorous urine. Denies gross hematuria, fever, chills N/V, constipation. Will go for urine testing but would like medication as soon as possible.   Additional Information   Negative: The patient has severe uncontrolled pain   Negative: The patient has a fever of 101 or higher   Negative: The patient has persistent vomiting    Answer Questions - Initial Assessment  When did your symptoms start: yesterday     Is burning with every void: yes    Do you have any other urinary symptoms, urinary frequency, urgency, incontinence: yes    Have you become unable to urinate: No: I do not feel as though I am retaining urine    Have you seen blood in your urine: no    Do you have any abdominal pain or flank pain: no    Do you have a fever of 101 or higher: no    Reviewed bladder irritants to avoid, and to stay hydrated with at least 64 oz. Of water/day as long as no fluid restrictions, avoidance of constipation. ED precautions reviewed as well.    Protocols used: Dysuria

## 2024-07-19 LAB — BACTERIA UR CULT: NORMAL

## 2024-10-10 ENCOUNTER — OFFICE VISIT (OUTPATIENT)
Dept: URGENT CARE | Facility: CLINIC | Age: 29
End: 2024-10-10
Payer: COMMERCIAL

## 2024-10-10 VITALS
DIASTOLIC BLOOD PRESSURE: 72 MMHG | BODY MASS INDEX: 39.07 KG/M2 | OXYGEN SATURATION: 98 % | HEART RATE: 82 BPM | WEIGHT: 199 LBS | TEMPERATURE: 97.7 F | HEIGHT: 60 IN | RESPIRATION RATE: 20 BRPM | SYSTOLIC BLOOD PRESSURE: 126 MMHG

## 2024-10-10 DIAGNOSIS — B34.9 VIRAL ILLNESS: Primary | ICD-10-CM

## 2024-10-10 LAB
SARS-COV-2 AG UPPER RESP QL IA: NEGATIVE
VALID CONTROL: NORMAL

## 2024-10-10 PROCEDURE — 87811 SARS-COV-2 COVID19 W/OPTIC: CPT | Performed by: PHYSICIAN ASSISTANT

## 2024-10-10 PROCEDURE — 99213 OFFICE O/P EST LOW 20 MIN: CPT | Performed by: PHYSICIAN ASSISTANT

## 2024-10-10 NOTE — LETTER
October 10, 2024     Patient: Fay Alexandra  YOB: 1995  Date of Visit: 10/10/2024      To Whom it May Concern:    Fay Alexandra is under my professional care. Fay was seen in my office on 10/10/2024. Fay may return to work on 10/14/24 .    If you have any questions or concerns, please don't hesitate to call.         Sincerely,          Renu Malone PA-C        CC: No Recipients

## 2024-10-10 NOTE — PROGRESS NOTES
"  [facility]        NAME: Fay Alexandra is a 29 y.o. female  : 1995    MRN: [id number]  DATE: October 10, 2024  TIME: 2:01 PM    Assessment and Plan   Viral illness [B34.9]  1. Viral illness  Poct Covid 19 Rapid Antigen Test        Negative COVID test.  Instructed patient to come back with any worsening symptoms.    Patient Instructions     Patient Instructions   Patient Education     Headaches in adults   The Basics   Written by the doctors and editors at [facility]   Are there different types of headaches? -- Yes. There are different types of headaches. The most common types are:   Tension headaches - These cause pressure or tightness on both sides of the head.   Migraine headaches - Migraine is a condition that involves a headache as well as other symptoms. Migraine headaches, or \"attacks,\" often start mild and then get worse. They often affect just 1 side of the head. The pain often feels like it is pounding or throbbing. Routine activities like walking or climbing stairs can make the headache worse. Migraine can also cause nausea or vomiting, or make you sensitive to light and sound.   Cluster headaches - These affect 1 side of the head. They start quickly, happen frequently, and are very painful. They also cause symptoms on the same side of the face where the headache is. For example, you might get a droopy or watery eye, a stuffy nose, or facial sweating on 1 side.   Secondary headaches - Sometimes, a headache is related to another health problem. Doctors call this \"secondary headache.\" For example, infections, illnesses, or medicines can cause a headache. Your doctor or nurse will help figure out if your headache is related to another condition.  What can I do to feel better? -- Some people feel better if they:   Take non-prescription pain medicines - Check with your doctor first if you have a health condition or already take prescription medicines.   Rest in a cool, dark, quiet room - " "This works best for migraine attacks. Some people find it helps to put a cold compress on their head or neck.  Should I see a doctor or nurse? -- Call for an ambulance (in the [place] and [place], call 9-1-1) if:   Your headache starts suddenly, quickly becomes severe, or could be described as \"the worst headache of your life.\"   You also have a seizure, personality changes, or confusion, or you pass out.   You have weakness, numbness, trouble speaking, or trouble seeing. (Migraine can sometimes cause these symptoms, but you should be seen right away the first time that these symptoms happen.)  See a doctor or nurse if:   You get frequent or severe headaches.   Your headache began after you exercised or had a minor injury.   You have new headaches, especially if you are pregnant or older than 50.   You have a fever or stiff neck with your headache.  Depending on your symptoms, your doctor or nurse might want to do tests. This can help them figure out if your headache might be related to another health problem.  What might be causing my headaches? -- Some people find that their headaches are triggered by certain foods or things they do. To get an idea of what might be causing your headaches, you can keep a \"headache diary.\" This involves writing down every time you have a headache and what you ate and did before it started.  Some common headache triggers include:   Stress   Skipping meals or eating too little   Having too little or too much caffeine   Sleeping too much or too little   Drinking alcohol   Certain foods or drinks   Not drinking enough water  You can also write down what medicine you took for the headache and whether or not it helped.  What can I do to keep from getting headaches? -- If you know what things trigger your headache, avoid those things if possible. For example, it might help to:   Change your eating or sleeping patterns.   Learn relaxation techniques and healthy ways to manage stress.   Make " healthy lifestyle changes, like quitting smoking and getting more physical activity.  If your headaches are frequent, severe, or long-lasting, your doctor can suggest ways to try to prevent them. In some cases, medicines can also help.  How are headaches treated? -- There are lots of medicines that can ease the pain of headaches. You can try taking acetaminophen (sample brand name: Tylenol), ibuprofen (sample brand names: Advil, Motrin), or naproxen (sample brand name: Aleve). There are prescription medicines that can help with pain, too.  The right treatment for you will depend on what type of headaches you get, how often you get them, and how bad they are. Some medicines are used to treat headaches, and others are taken every day to try to prevent headaches.  If you get headaches often, work with your doctor to find a treatment that helps. Do not try to manage frequent headaches on your own with non-prescription pain medicines. Taking these too often can actually cause more headaches later.  All topics are updated as new evidence becomes available and our peer review process is complete.  This topic retrieved from [employer] on: Mar 24, 2024.  Topic 73706 Version 10.0  Release: 32.2.4 - C32.82  © 2024 UpToDate, Inc. and/or its affiliates. All rights reserved.  Consumer Information Use and Disclaimer   Disclaimer: This generalized information is a limited summary of diagnosis, treatment, and/or medication information. It is not meant to be comprehensive and should be used as a tool to help the user understand and/or assess potential diagnostic and treatment options. It does NOT include all information about conditions, treatments, medications, side effects, or risks that may apply to a specific patient. It is not intended to be medical advice or a substitute for the medical advice, diagnosis, or treatment of a health care provider based on the health care provider's examination and assessment of a patient's specific  and unique circumstances. Patients must speak with a health care provider for complete information about their health, medical questions, and treatment options, including any risks or benefits regarding use of medications. This information does not endorse any treatments or medications as safe, effective, or approved for treating a specific patient. UpToDate, Inc. and its affiliates disclaim any warranty or liability relating to this information or the use thereof.The use of this information is governed by the Terms of Use, available at https://www."[employer].com/en/know/clinical-effectiveness-terms. 2024© UpToDate, Inc. and its affiliates and/or licensors. All rights reserved.  Copyright   © 2024 UpToDate, Inc. and/or its affiliates. All rights reserved.        Follow up with PCP in 3-5 days.  Proceed to  ER if symptoms worsen.    Chief Complaint     Chief Complaint   Patient presents with    viral illness     Headache x 1 week, body aches x 2 days would like to be checked for covid         History of Present Illness       The patient is a 29-year-old female presenting today for headache x 4 days and myalgias x 2 days.  Would like a COVID test.  Tried Tylenol is not been helping.         Review of Systems   Review of Systems   Constitutional:  Negative for activity change, appetite change, chills, fatigue and fever.   HENT:  Negative for congestion, rhinorrhea, sinus pressure, sinus pain and sore throat.    Respiratory:  Negative for cough, chest tightness and shortness of breath.    Cardiovascular:  Negative for chest pain and palpitations.   Gastrointestinal:  Negative for diarrhea, nausea and vomiting.   Musculoskeletal:  Positive for myalgias. Negative for arthralgias.   Skin:  Negative for color change and pallor.   Neurological:  Positive for headaches.         Current Medications       Current Outpatient Medications:     buPROPion (WELLBUTRIN) 75 mg tablet, TAKE 1 TABLET BY MOUTH EVERY MORNING FOR ADHD,  Disp: , Rfl:     Multiple Vitamin (multivitamin) tablet, Take 1 tablet by mouth daily, Disp: , Rfl:     Nortrel 1/35, 21, 1-35 MG-MCG per tablet, TAKE 1 TABLET BY MOUTH DAILY, Disp: 21 tablet, Rfl: 16    sertraline (ZOLOFT) 100 mg tablet, Take 200 mg by mouth daily 2 100mg per day., Disp: , Rfl:     SUMAtriptan (IMITREX) 5 MG/ACT nasal spray, 1 spray (5 mg total) into each nostril every 2 (two) hours as needed for migraine ; if headache returns/persists, may repeat dose once after 2 hours, Disp: 6 each, Rfl: 5    OXcarbazepine (TRILEPTAL) 300 mg tablet, TAKE 1 TABLET BY MOUTH TWICE DAILY FOR MOOD (Patient not taking: Reported on 10/10/2024), Disp: , Rfl:     traZODone (DESYREL) 50 mg tablet, Take 50 mg by mouth daily at bedtime as needed (Patient not taking: Reported on 10/10/2024), Disp: , Rfl:     Current Allergies     Allergies as of 10/10/2024 - Reviewed 10/10/2024   Allergen Reaction Noted    Other Hives 2021            The following portions of the patient's history were reviewed and updated as appropriate: allergies, current medications, past family history, past medical history, past social history, past surgical history and problem list.     Past Medical History:   Diagnosis Date    Anxiety     Depression     Eczema     Kidney stone     Morbid obesity with BMI of 50.0-59.9, adult (HCC)     Urinary tract infection        Past Surgical History:   Procedure Laterality Date     SECTION   and     EGD      FL RETROGRADE PYELOGRAM  2024    NV CYSTO BLADDER W/URETERAL CATHETERIZATION Right 2024    Procedure: CYSTOSCOPY RETROGRADE PYELOGRAM;  Surgeon: Son Bishop MD;  Location: [facility];  Service: Urology    NV CYSTO/URETERO W/LITHOTRIPSY &INDWELL STENT INSRT Left 2024    Procedure: CYSTOSCOPY, URETEROSCOPY, BILATERAL RETROGRADE PYELOGRAM, BASKET STONE REMOVAL, AND INSERTION STENT URETERAL;  Surgeon: Son Bishop MD;  Location: [facility];  Service: Urology     MA LAPS GSTR RSTCV PX W/BYP CECILE-EN-Y LIMB <150 CM N/A 07/18/2022    Procedure: LAPAROSCOPIC CECILE-EN-Y GASTRIC BYPASS AND INTRAOPERATIVE EGD;  Surgeon: Quan Eaton MD;  Location: [facility];  Service: Bariatrics       Family History   Problem Relation Age of Onset    Lung cancer Mother     Obesity Mother     Cancer Mother         Passed away in 2006 of lung cancer    Thyroid disease Father     Obesity Father     Obesity Sister     Diabetes Neg Hx     Stroke Neg Hx     Heart disease Neg Hx          Medications have been verified.        Objective   /72   Pulse 82   Temp 97.7 °F (36.5 °C)   Resp 20   Ht 5' (1.524 m)   Wt 90.3 kg (199 lb)   SpO2 98%   BMI 38.86 kg/m²        Physical Exam     Physical Exam  Vitals and nursing note reviewed.   Constitutional:       General: She is not in acute distress.     Appearance: Normal appearance. She is well-developed and normal weight. She is not ill-appearing, toxic-appearing or diaphoretic.   HENT:      Head: Normocephalic and atraumatic.      Right Ear: Tympanic membrane, ear canal and external ear normal. No drainage, swelling or tenderness. No middle ear effusion. There is no impacted cerumen. Tympanic membrane is not erythematous.      Left Ear: Tympanic membrane, ear canal and external ear normal. No drainage, swelling or tenderness.  No middle ear effusion. There is no impacted cerumen. Tympanic membrane is not erythematous.      Nose: Nose normal. No congestion or rhinorrhea.      Mouth/Throat:      Mouth: Mucous membranes are moist. No oral lesions.      Pharynx: Oropharynx is clear. Uvula midline. No pharyngeal swelling, oropharyngeal exudate, posterior oropharyngeal erythema or uvula swelling.      Tonsils: No tonsillar exudate or tonsillar abscesses. 0 on the right. 0 on the left.   Eyes:      Extraocular Movements:      Right eye: Normal extraocular motion.      Left eye: Normal extraocular motion.      Conjunctiva/sclera: Conjunctivae normal.       Pupils: Pupils are equal, round, and reactive to light.   Cardiovascular:      Rate and Rhythm: Normal rate and regular rhythm.      Heart sounds: Normal heart sounds. No murmur heard.     No friction rub. No gallop.   Pulmonary:      Effort: Pulmonary effort is normal. No respiratory distress.      Breath sounds: Normal breath sounds. No stridor. No wheezing, rhonchi or rales.   Chest:      Chest wall: No tenderness.   Musculoskeletal:         General: Normal range of motion.   Skin:     General: Skin is warm and dry.      Capillary Refill: Capillary refill takes less than 2 seconds.   Neurological:      Mental Status: She is alert.

## 2024-10-10 NOTE — PATIENT INSTRUCTIONS
"Patient Education     Headaches in adults   The Basics   Written by the doctors and editors at [facility]   Are there different types of headaches? -- Yes. There are different types of headaches. The most common types are:   Tension headaches - These cause pressure or tightness on both sides of the head.   Migraine headaches - Migraine is a condition that involves a headache as well as other symptoms. Migraine headaches, or \"attacks,\" often start mild and then get worse. They often affect just 1 side of the head. The pain often feels like it is pounding or throbbing. Routine activities like walking or climbing stairs can make the headache worse. Migraine can also cause nausea or vomiting, or make you sensitive to light and sound.   Cluster headaches - These affect 1 side of the head. They start quickly, happen frequently, and are very painful. They also cause symptoms on the same side of the face where the headache is. For example, you might get a droopy or watery eye, a stuffy nose, or facial sweating on 1 side.   Secondary headaches - Sometimes, a headache is related to another health problem. Doctors call this \"secondary headache.\" For example, infections, illnesses, or medicines can cause a headache. Your doctor or nurse will help figure out if your headache is related to another condition.  What can I do to feel better? -- Some people feel better if they:   Take non-prescription pain medicines - Check with your doctor first if you have a health condition or already take prescription medicines.   Rest in a cool, dark, quiet room - This works best for migraine attacks. Some people find it helps to put a cold compress on their head or neck.  Should I see a doctor or nurse? -- Call for an ambulance (in the [place] and [place], call 9-1-1) if:   Your headache starts suddenly, quickly becomes severe, or could be described as \"the worst headache of your life.\"   You also have a seizure, personality changes, or confusion, or " "you pass out.   You have weakness, numbness, trouble speaking, or trouble seeing. (Migraine can sometimes cause these symptoms, but you should be seen right away the first time that these symptoms happen.)  See a doctor or nurse if:   You get frequent or severe headaches.   Your headache began after you exercised or had a minor injury.   You have new headaches, especially if you are pregnant or older than 50.   You have a fever or stiff neck with your headache.  Depending on your symptoms, your doctor or nurse might want to do tests. This can help them figure out if your headache might be related to another health problem.  What might be causing my headaches? -- Some people find that their headaches are triggered by certain foods or things they do. To get an idea of what might be causing your headaches, you can keep a \"headache diary.\" This involves writing down every time you have a headache and what you ate and did before it started.  Some common headache triggers include:   Stress   Skipping meals or eating too little   Having too little or too much caffeine   Sleeping too much or too little   Drinking alcohol   Certain foods or drinks   Not drinking enough water  You can also write down what medicine you took for the headache and whether or not it helped.  What can I do to keep from getting headaches? -- If you know what things trigger your headache, avoid those things if possible. For example, it might help to:   Change your eating or sleeping patterns.   Learn relaxation techniques and healthy ways to manage stress.   Make healthy lifestyle changes, like quitting smoking and getting more physical activity.  If your headaches are frequent, severe, or long-lasting, your doctor can suggest ways to try to prevent them. In some cases, medicines can also help.  How are headaches treated? -- There are lots of medicines that can ease the pain of headaches. You can try taking acetaminophen (sample brand name: Tylenol), " ibuprofen (sample brand names: Advil, Motrin), or naproxen (sample brand name: Aleve). There are prescription medicines that can help with pain, too.  The right treatment for you will depend on what type of headaches you get, how often you get them, and how bad they are. Some medicines are used to treat headaches, and others are taken every day to try to prevent headaches.  If you get headaches often, work with your doctor to find a treatment that helps. Do not try to manage frequent headaches on your own with non-prescription pain medicines. Taking these too often can actually cause more headaches later.  All topics are updated as new evidence becomes available and our peer review process is complete.  This topic retrieved from [employer] on: Mar 24, 2024.  Topic 54671 Version 10.0  Release: 32.2.4 - C32.82  © 2024 UpToDate, Inc. and/or its affiliates. All rights reserved.  Consumer Information Use and Disclaimer   Disclaimer: This generalized information is a limited summary of diagnosis, treatment, and/or medication information. It is not meant to be comprehensive and should be used as a tool to help the user understand and/or assess potential diagnostic and treatment options. It does NOT include all information about conditions, treatments, medications, side effects, or risks that may apply to a specific patient. It is not intended to be medical advice or a substitute for the medical advice, diagnosis, or treatment of a health care provider based on the health care provider's examination and assessment of a patient's specific and unique circumstances. Patients must speak with a health care provider for complete information about their health, medical questions, and treatment options, including any risks or benefits regarding use of medications. This information does not endorse any treatments or medications as safe, effective, or approved for treating a specific patient. UpToDate, Inc. and its affiliates disclaim  any warranty or liability relating to this information or the use thereof.The use of this information is governed by the Terms of Use, available at https://www.[employer]ters[employer]wer.com/en/know/clinical-effectiveness-terms. 2024© UpToDate, Inc. and its affiliates and/or licensors. All rights reserved.  Copyright   © 2024 [employer] and/or its affiliates. All rights reserved.

## 2024-12-02 ENCOUNTER — TELEPHONE (OUTPATIENT)
Dept: BARIATRICS | Facility: CLINIC | Age: 29
End: 2024-12-02

## 2025-05-16 ENCOUNTER — TELEPHONE (OUTPATIENT)
Age: 30
End: 2025-05-16

## 2025-05-16 DIAGNOSIS — N94.89 MENSTRUATION, SUPPRESSION: ICD-10-CM

## 2025-05-16 RX ORDER — NORETHINDRONE AND ETHINYL ESTRADIOL 1; .035 MG/1; MG/1
1 TABLET ORAL DAILY
Qty: 21 TABLET | Refills: 0 | Status: SHIPPED | OUTPATIENT
Start: 2025-05-16

## 2025-05-16 RX ORDER — NORETHINDRONE AND ETHINYL ESTRADIOL 1; .035 MG/1; MG/1
1 TABLET ORAL DAILY
Qty: 21 TABLET | Refills: 16 | Status: CANCELLED | OUTPATIENT
Start: 2025-05-16

## 2025-05-23 NOTE — TELEPHONE ENCOUNTER
Scheduled PT 6/10@ 1:20-- made appt name OB visit 40 mins for follow up, is that what was needed? Let me know. Thanks

## 2025-06-10 ENCOUNTER — ANNUAL EXAM (OUTPATIENT)
Age: 30
End: 2025-06-10

## 2025-06-10 VITALS
HEART RATE: 75 BPM | HEIGHT: 60 IN | TEMPERATURE: 97.2 F | OXYGEN SATURATION: 99 % | DIASTOLIC BLOOD PRESSURE: 74 MMHG | SYSTOLIC BLOOD PRESSURE: 117 MMHG | WEIGHT: 205 LBS | BODY MASS INDEX: 40.25 KG/M2

## 2025-06-10 DIAGNOSIS — N94.89 MENSTRUATION, SUPPRESSION: ICD-10-CM

## 2025-06-10 DIAGNOSIS — Z12.4 SCREENING FOR CERVICAL CANCER: ICD-10-CM

## 2025-06-10 DIAGNOSIS — N92.1 MENORRHAGIA WITH IRREGULAR CYCLE: ICD-10-CM

## 2025-06-10 DIAGNOSIS — Z98.84 S/P GASTRIC BYPASS: ICD-10-CM

## 2025-06-10 DIAGNOSIS — Z01.419 ENCOUNTER FOR ANNUAL ROUTINE GYNECOLOGICAL EXAMINATION: Primary | ICD-10-CM

## 2025-06-10 PROCEDURE — G0145 SCR C/V CYTO,THINLAYER,RESCR: HCPCS

## 2025-06-10 PROCEDURE — G0476 HPV COMBO ASSAY CA SCREEN: HCPCS

## 2025-06-10 PROCEDURE — 99385 PREV VISIT NEW AGE 18-39: CPT | Performed by: OBSTETRICS & GYNECOLOGY

## 2025-06-10 RX ORDER — NORETHINDRONE AND ETHINYL ESTRADIOL 1; .035 MG/1; MG/1
1 TABLET ORAL DAILY
Qty: 21 TABLET | Refills: 3 | Status: SHIPPED | OUTPATIENT
Start: 2025-06-10

## 2025-06-10 NOTE — PROGRESS NOTES
Name: Fay Alexandra      : 1995      MRN: [id number]  Encounter Provider: Allegra Pereira DO  Encounter Date: 6/10/2025   Encounter department: [facility]  :  Assessment & Plan  Encounter for annual routine gynecological examination  Patient used to have heavy menstrual bleeding prior to being on birth control and also had some irregular periods. Mother used to have fibroids.  Admits to pain with intercourse.  - used to smoke, quit in 2016, and hasn't relapsed since  - On Nortrel (pack of 21); started taking birth control 2 yrs. ago  - Desires hysterectomy   - Has 2 kids already; doesn't desire anymore   - Was told that after her 2nd LTCS, she was at a high risk for uterine rupture; bled a lot and had D&C  Needs record from previous deliveries (2 different hospitals); f/u with us  Breast exam normal b/l       Screening for cervical cancer  Patient has had pap smears in the past which have been normal.  Has h/o smoking cigarettes; quit in  w/o any relapses  Orders:    Conventional pap smear, screening; Future    Menstruation, suppression  Patient states that she is not always consistent with the BC.  Given tips on how to make sure she doesn't forget  Will continue on this method of BC due to her h/o menorrhagia   Orders:    norethindrone-ethinyl estradiol (Nortrel 1/35, 21,) 1-35 MG-MCG per tablet; Take 1 tablet by mouth daily    S/P gastric bypass    Has hypoglycemic episodes (sugars as low as 40s and 50s multiple times a week)   Drink a glass of orange juice when sugars under 70s  F/u with bariatrics for diet modification       Menorrhagia with irregular cycle  Patient has a history of failed IUD placement. Mother had fibroids, and sister has endometriosis.   Patient admits that prior to starting birth control she had heavy bleeding and irregular periods  Continue with current birth control (Nortrel)   F/u pelvic ultrasound to evaluate for fibroids, PCOS,  etc  F/u with us in 3 months   Orders:    US pelvis complete w transvaginal; Future           History of Present Illness   Patient is a 30 yr. old female, with PMH of anxiety, depression, eczema, s/p gastric bypass in 2022 who presents with complaints of wanting to switch birth controls. Patient states she no longer wants to take pills, and would like alternate methods of BC, and eventually a hysterectomy. Patient tried to get an IUD placed, and was told they couldn't place it, and was cramping and bleeding post procedure for 2 days  Patient admits h/o migraines  Patient denies periods (on OCP w/o placebo), chest pain, dizziness, lightheadedness    - No family h/o blood clots   - no personal h/o blood clots  - no family h/o breast cancer or BRCA gene carriers   - surgical h/o gastric bypass and LTCS x 2       Review of Systems    Objective   /74 (BP Location: Left arm, Patient Position: Sitting, Cuff Size: Standard)   Pulse 75   Temp (!) 97.2 °F (36.2 °C) (Tympanic)   Ht 5' (1.524 m)   Wt 93 kg (205 lb)   SpO2 99%   BMI 40.04 kg/m²      Physical Exam  Vitals reviewed.   Constitutional:       Appearance: Normal appearance.   HENT:      Head: Normocephalic and atraumatic.      Nose: Nose normal.      Mouth/Throat:      Mouth: Mucous membranes are moist.      Pharynx: Oropharynx is clear.     Cardiovascular:      Rate and Rhythm: Normal rate and regular rhythm.      Pulses: Normal pulses.      Heart sounds: Normal heart sounds.   Pulmonary:      Effort: Pulmonary effort is normal. No respiratory distress.      Breath sounds: Normal breath sounds.   Chest:   Breasts:     Right: Normal. No bleeding, inverted nipple, mass, nipple discharge, skin change or tenderness.      Left: Normal. No bleeding, inverted nipple, mass, nipple discharge, skin change or tenderness.   Abdominal:      General: Abdomen is flat. Bowel sounds are normal.      Palpations: Abdomen is soft.      Tenderness: There is no abdominal  tenderness. There is no guarding or rebound.   Genitourinary:     General: Normal vulva.      Urethra: No urethral lesion.      Vagina: No vaginal discharge.     Musculoskeletal:         General: Normal range of motion.      Cervical back: Normal range of motion and neck supple.   Lymphadenopathy:      Upper Body:      Right upper body: No axillary adenopathy.      Left upper body: No axillary adenopathy.     Skin:     General: Skin is warm and dry.     Neurological:      General: No focal deficit present.      Mental Status: She is alert and oriented to person, place, and time. Mental status is at baseline.     Psychiatric:         Mood and Affect: Mood normal.         Behavior: Behavior normal.         Thought Content: Thought content normal.         Judgment: Judgment normal.

## 2025-06-11 LAB
HPV HR 12 DNA CVX QL NAA+PROBE: NEGATIVE
HPV16 DNA CVX QL NAA+PROBE: NEGATIVE
HPV18 DNA CVX QL NAA+PROBE: NEGATIVE

## 2025-06-16 ENCOUNTER — RESULTS FOLLOW-UP (OUTPATIENT)
Age: 30
End: 2025-06-16

## 2025-06-16 ENCOUNTER — HOSPITAL ENCOUNTER (OUTPATIENT)
Dept: RADIOLOGY | Facility: HOSPITAL | Age: 30
Discharge: HOME | End: 2025-06-16
Attending: OBSTETRICS & GYNECOLOGY
Payer: COMMERCIAL

## 2025-06-16 DIAGNOSIS — N92.1 MENORRHAGIA WITH IRREGULAR CYCLE: ICD-10-CM

## 2025-06-16 LAB
LAB AP GYN PRIMARY INTERPRETATION: NORMAL
Lab: NORMAL

## 2025-06-16 PROCEDURE — 76830 TRANSVAGINAL US NON-OB: CPT

## 2025-06-16 PROCEDURE — 76856 US EXAM PELVIC COMPLETE: CPT

## 2025-07-20 ENCOUNTER — HOSPITAL ENCOUNTER (EMERGENCY)
Facility: HOSPITAL | Age: 30
Discharge: HOME | End: 2025-07-20
Attending: EMERGENCY MEDICINE | Admitting: EMERGENCY MEDICINE
Payer: COMMERCIAL

## 2025-07-20 VITALS
SYSTOLIC BLOOD PRESSURE: 119 MMHG | TEMPERATURE: 97.9 F | RESPIRATION RATE: 20 BRPM | DIASTOLIC BLOOD PRESSURE: 65 MMHG | OXYGEN SATURATION: 100 % | HEART RATE: 89 BPM

## 2025-07-20 DIAGNOSIS — L92.3 TATTOO REACTION: Primary | ICD-10-CM

## 2025-07-20 DIAGNOSIS — L03.90 CELLULITIS: ICD-10-CM

## 2025-07-20 PROCEDURE — 99284 EMERGENCY DEPT VISIT MOD MDM: CPT | Performed by: EMERGENCY MEDICINE

## 2025-07-20 PROCEDURE — 99282 EMERGENCY DEPT VISIT SF MDM: CPT

## 2025-07-20 RX ORDER — CEPHALEXIN 500 MG/1
500 CAPSULE ORAL ONCE
Status: COMPLETED | OUTPATIENT
Start: 2025-07-20 | End: 2025-07-20

## 2025-07-20 RX ORDER — MUPIROCIN 2 %
OINTMENT (GRAM) TOPICAL 3 TIMES DAILY
Qty: 15 G | Refills: 0 | Status: SHIPPED | OUTPATIENT
Start: 2025-07-20

## 2025-07-20 RX ORDER — CEPHALEXIN 500 MG/1
500 CAPSULE ORAL EVERY 6 HOURS SCHEDULED
Qty: 28 CAPSULE | Refills: 0 | Status: SHIPPED | OUTPATIENT
Start: 2025-07-20 | End: 2025-07-27

## 2025-07-20 RX ADMIN — CEPHALEXIN 500 MG: 500 CAPSULE ORAL at 15:52

## 2025-07-20 NOTE — ED PROVIDER NOTES
Time reflects when diagnosis was documented in both MDM as applicable and the Disposition within this note       Time User Action Codes Description Comment    7/20/2025  3:43 PM Maximus Ford Add [L92.3] Tattoo reaction     7/20/2025  3:43 PM Maximus Ford Add [L03.90] Cellulitis           ED Disposition       ED Disposition   Discharge    Condition   Stable    Date/Time   Sun Jul 20, 2025  3:39 PM    Comment   Fayreal Barreragelo discharge to home/self care.                   Assessment & Plan       Medical Decision Making  Exam consistent with cellulitis.  Given crusting have some concern for impetigo.  Treated patient with Keflex, mupirocin.  Instructed on symptomatic management.  Discharged with return precautions.    Risk  Prescription drug management.             Medications   cephalexin (KEFLEX) capsule 500 mg (500 mg Oral Given 7/20/25 1552)       ED Risk Strat Scores                    No data recorded                            History of Present Illness       Chief Complaint   Patient presents with    Wound Check     Had tattoo done 5 days ago, states irritation usually subsides but it is not. More red and spreading       Past Medical History[1]   Past Surgical History[2]   Family History[3]   Social History[4]   E-Cigarette/Vaping    E-Cigarette Use Never User       E-Cigarette/Vaping Substances    Nicotine No     THC No     CBD No     Flavoring No     Other No     Unknown No       I have reviewed and agree with the history as documented.     Patient is a 30-year-old female presenting for evaluation of pain, redness, drainage from the tattoo site.  Patient had tattoo placed 4 days ago, states relatively mild pain immediately after the work was done but states it has progressively worsened and is now severe.  Patient denies fevers, chills, fatigue, constitutional symptoms.        Review of Systems   Constitutional:  Negative for chills and fever.   Skin:  Positive for color change and wound.    Neurological:  Negative for weakness and numbness.   All other systems reviewed and are negative.          Objective       ED Triage Vitals [07/20/25 1510]   Temperature Pulse Blood Pressure Respirations SpO2 Patient Position - Orthostatic VS   97.9 °F (36.6 °C) 89 119/65 20 100 % Sitting      Temp Source Heart Rate Source BP Location FiO2 (%) Pain Score    Tympanic Monitor Right arm -- 2      Vitals      Date and Time Temp Pulse SpO2 Resp BP Pain Score FACES Pain Rating User   07/20/25 1510 97.9 °F (36.6 °C) 89 100 % 20 119/65 2 -- LS            Physical Exam  Vitals and nursing note reviewed.   Constitutional:       General: She is not in acute distress.     Appearance: Normal appearance. She is not ill-appearing, toxic-appearing or diaphoretic.   HENT:      Head: Normocephalic and atraumatic.      Right Ear: External ear normal.      Left Ear: External ear normal.     Eyes:      General:         Right eye: No discharge.         Left eye: No discharge.     Pulmonary:      Effort: No respiratory distress.   Abdominal:      General: There is no distension.     Musculoskeletal:         General: No deformity.      Cervical back: Normal range of motion.     Skin:     Findings: No lesion or rash.      Comments: Patient erythema, warmth adjacent to tattoo site consistent with cellulitis.  Crusting over the tattoo itself     Neurological:      Mental Status: She is alert and oriented to person, place, and time. Mental status is at baseline.     Psychiatric:         Mood and Affect: Mood and affect normal.         Results Reviewed       None            No orders to display       Procedures    ED Medication and Procedure Management   Prior to Admission Medications   Prescriptions Last Dose Informant Patient Reported? Taking?   Multiple Vitamin (multivitamin) tablet  Self Yes No   Sig: Take 1 tablet by mouth in the morning.   Patient not taking: Reported on 6/10/2025   OXcarbazepine (TRILEPTAL) 300 mg tablet   Yes No    Sig: TAKE 1 TABLET BY MOUTH TWICE DAILY FOR MOOD   Patient not taking: Reported on 10/10/2024   SUMAtriptan (IMITREX) 5 MG/ACT nasal spray   No No   Si spray (5 mg total) into each nostril every 2 (two) hours as needed for migraine ; if headache returns/persists, may repeat dose once after 2 hours   buPROPion (WELLBUTRIN) 75 mg tablet   Yes No   Patient not taking: Reported on 6/10/2025   norethindrone-ethinyl estradiol (Nortrel 35, 21,) 1-35 MG-MCG per tablet   No No   Sig: Take 1 tablet by mouth daily   sertraline (ZOLOFT) 100 mg tablet  Self Yes No   Sig: Take 200 mg by mouth daily 2 100mg per day.   traZODone (DESYREL) 50 mg tablet  Self Yes No   Sig: Take 50 mg by mouth daily at bedtime as needed   Patient not taking: Reported on 10/10/2024      Facility-Administered Medications: None     Discharge Medication List as of 2025  3:44 PM        START taking these medications    Details   cephalexin (KEFLEX) 500 mg capsule Take 1 capsule (500 mg total) by mouth every 6 (six) hours for 7 days, Starting Sun 2025, Until Sun 2025, Normal           CONTINUE these medications which have NOT CHANGED    Details   buPROPion (WELLBUTRIN) 75 mg tablet Historical Med      Multiple Vitamin (multivitamin) tablet Take 1 tablet by mouth in the morning., Historical Med      norethindrone-ethinyl estradiol (Nortrel 35, 21,) 1-35 MG-MCG per tablet Take 1 tablet by mouth daily, Starting Tue 6/10/2025, Normal      OXcarbazepine (TRILEPTAL) 300 mg tablet TAKE 1 TABLET BY MOUTH TWICE DAILY FOR MOOD, Historical Med      sertraline (ZOLOFT) 100 mg tablet Take 200 mg by mouth daily 2 100mg per day., Starting 2023, Historical Med      SUMAtriptan (IMITREX) 5 MG/ACT nasal spray 1 spray (5 mg total) into each nostril every 2 (two) hours as needed for migraine ; if headache returns/persists, may repeat dose once after 2 hours, Starting 2024, Until Thu 10/10/2024 at 2359, Normal      traZODone (DESYREL)  50 mg tablet Take 50 mg by mouth daily at bedtime as needed, Starting Tue 2023, Historical Med           No discharge procedures on file.  ED SEPSIS DOCUMENTATION   Time reflects when diagnosis was documented in both MDM as applicable and the Disposition within this note       Time User Action Codes Description Comment    2025  3:43 PM Maximus Ford Add [L92.3] Tattoo reaction     2025  3:43 PM Maximus Ford Add [L03.90] Cellulitis                      [1]   Past Medical History:  Diagnosis Date    Anxiety     Depression     Eczema     HL (hearing loss)     I feel like i dont hear as well as i should    Kidney stone     Morbid obesity with BMI of 50.0-59.9, adult (HCC)     Nosebleed     Obesity     Urinary tract infection     Visual impairment     I wear glasses   [2]   Past Surgical History:  Procedure Laterality Date     SECTION   and     EGD      FL RETROGRADE PYELOGRAM  2024    MD CYSTO/URETERO W/LITHOTRIPSY &INDWELL STENT INSRT Left 2024    Procedure: CYSTOSCOPY, URETEROSCOPY, BILATERAL RETROGRADE PYELOGRAM, BASKET STONE REMOVAL, AND INSERTION STENT URETERAL;  Surgeon: Son Bishop MD;  Location: [facility];  Service: Urology    MD CYSTOURETHROSCOPY W/URETERAL CATHETERIZATION Right 2024    Procedure: CYSTOSCOPY RETROGRADE PYELOGRAM;  Surgeon: Son Bishop MD;  Location: [facility];  Service: Urology    MD LAPS GSTR RSTCV PX W/BYP CECILE-EN-Y LIMB <150 CM N/A 2022    Procedure: LAPAROSCOPIC CECILE-EN-Y GASTRIC BYPASS AND INTRAOPERATIVE EGD;  Surgeon: Quan Eaton MD;  Location: [facility];  Service: Bariatrics   [3]   Family History  Problem Relation Name Age of Onset    Lung cancer Mother Blanka Carvajal     Obesity Mother Blanka PADILLA'Ion     Cancer Mother Blanka Carvajal         Passed away in  of lung cancer    Thyroid disease Father Yahir Doeo     Obesity Father Yahir Doeo     Obesity Sister      Diabetes Neg  Hx      Stroke Neg Hx      Heart disease Neg Hx     [4]   Social History  Tobacco Use    Smoking status: Former     Current packs/day: 0.00     Average packs/day: 0.3 packs/day for 5.0 years (1.3 ttl pk-yrs)     Types: Cigarettes     Start date: 6/10/2011     Quit date: 6/10/2016     Years since quittin.1     Passive exposure: Past    Smokeless tobacco: Never    Tobacco comments:     I smoked on and off not 5 years straight   Vaping Use    Vaping status: Never Used   Substance Use Topics    Alcohol use: Not Currently     Comment: I will have a drink 1-2 times a year    Drug use: Yes     Types: Marijuana     Comment: for migraine        Maximus Ford MD  [phone]

## 2025-07-20 NOTE — DISCHARGE INSTRUCTIONS
Use the prescribed antibiotic for the next week.  If after 2 full days of antibiotics you are still having spreading redness, warmth, significant worsening of pain, fevers, shaking chills, return to the emergency department.

## 2025-07-21 ENCOUNTER — TELEPHONE (OUTPATIENT)
Age: 30
End: 2025-07-21

## 2025-07-21 NOTE — TELEPHONE ENCOUNTER
Contacted Patient regarding recent ED Visit dated 7/20/25     Advised patient to contact the office with new or worsen symptoms as we have On Call Provider 24 hrs or return to ED if needed. Provided office hours and phone. No further action needed at this time.        ED:Steve  CC: Wound Check  DX:Tattoo Reaction   Time:  3:03pm   Last OV: 2/21/24      Patient does not wish to schedule a Follow up at this time.

## 2025-07-22 ENCOUNTER — HOSPITAL ENCOUNTER (EMERGENCY)
Facility: HOSPITAL | Age: 30
Discharge: HOME | End: 2025-07-22
Attending: STUDENT IN AN ORGANIZED HEALTH CARE EDUCATION/TRAINING PROGRAM | Admitting: STUDENT IN AN ORGANIZED HEALTH CARE EDUCATION/TRAINING PROGRAM
Payer: COMMERCIAL

## 2025-07-22 VITALS
RESPIRATION RATE: 18 BRPM | TEMPERATURE: 98.5 F | BODY MASS INDEX: 37.3 KG/M2 | DIASTOLIC BLOOD PRESSURE: 85 MMHG | WEIGHT: 190 LBS | HEART RATE: 85 BPM | HEIGHT: 60 IN | SYSTOLIC BLOOD PRESSURE: 132 MMHG | OXYGEN SATURATION: 99 %

## 2025-07-22 DIAGNOSIS — Z51.89 VISIT FOR WOUND CHECK: Primary | ICD-10-CM

## 2025-07-22 PROCEDURE — 99282 EMERGENCY DEPT VISIT SF MDM: CPT

## 2025-07-22 PROCEDURE — 99284 EMERGENCY DEPT VISIT MOD MDM: CPT | Performed by: PHYSICIAN ASSISTANT

## 2025-07-22 RX ORDER — ACETAMINOPHEN 325 MG/1
975 TABLET ORAL ONCE
Status: DISCONTINUED | OUTPATIENT
Start: 2025-07-22 | End: 2025-07-22 | Stop reason: HOSPADM

## 2025-07-26 ENCOUNTER — NURSE TRIAGE (OUTPATIENT)
Dept: OTHER | Facility: OTHER | Age: 30
End: 2025-07-26

## 2025-07-26 NOTE — TELEPHONE ENCOUNTER
"REASON FOR CONVERSATION: Follow-Up Cellulitis    SYMPTOMS: Cellulitis from tattoo diagnosed in the ED; symptoms are improving, but nonadherent dressing is still sticking to wound; it is painful to remove. Wound is not oozing, but looks wet. Continues to take Keflex and redness is improving; no fever. Patient unsure if she should continue to apply dressing or leave open to air. Pain 6/10 with movement; taking Tylenol with minimal relief    OTHER HEALTH INFORMATION: None    PROTOCOL DISPOSITION: Call PCP Now    CARE ADVICE PROVIDED: Per on-call provider, please keep wound covered when possible.    PRACTICE FOLLOW-UP: Patient asking how long she should keep wound covered -until it is scabbed over or completely healed? Please follow up with patient to advise / schedule if needed.      Reason for Disposition   [1] Caller has URGENT question AND [2] triager unable to answer question    Answer Assessment - Initial Assessment Questions  1. SYMPTOM: \"What's the main symptom you're concerned about?\" (e.g., redness, swelling, pain, fever, weakness)        Wound got stuck to patient's shirt; she was advised to keep it covered while it was oozing; non adhesive bandage was adhering to it. It was painful when dressing is removed and it causing more bleeding. Wound is still really raw    2. CELLULITIS LOCATION: \"Where is the cellulitis located?\" (e.g., hand, arm, foot, leg, face)        Left upper arm from 3\" above elbow to shoulder    3. CELLULITIS SIZE: \"What is the size of the red area?\" (e.g., inches, centimeters; compare to size of a coin).    Redness surrounding the wound is improving, but there a section that is open and raw; patient has been trying to keep it uncovered, but it is causing more pain    4. BETTER-SAME-WORSE: \"Are you getting better, staying the same, or getting worse compared to the day you started the antibiotics?\"         Better    5. PAIN: Do you have any pain?\"  If Yes, ask: \"How bad is the pain?\"  (e.g., " "Scale 0-10; mild, moderate, or severe)        2/10 at rest, 6/10 with movement; taking Tylenol every 6 hours    6. FEVER: \"Do you have a fever?\" If Yes, ask: \"What is it, how was it measured and when did it start?\"        Denies    7. OTHER SYMPTOMS: \"Do you have any other symptoms?\" (e.g., pus coming from a wound, red streaks, weakness)        It looks wet, but it isn't oozing    8. DIAGNOSIS DATE: \"When was the cellulitis diagnosed?\" \"By whom?\"         Emergency Department    9. ANTIBIOTIC NAME: \"What antibiotic(s) are you taking?\"  \"How many times per day?\" (Be sure the patient is taking the antibiotic as directed).         Keflex    10. ANTIBIOTIC DATE: \"When was the antibiotic started?\"          7/20    11. FOLLOW-UP APPOINTMENT: \"Do you have a follow-up appointment with your doctor?\"            Denies    Protocols used: Cellulitis on Antibiotic Follow-up Call-Adult-    "

## 2025-07-26 NOTE — TELEPHONE ENCOUNTER
"Regarding: Left arm/ Open Wound  ----- Message from Ketty GONCALVES sent at 7/26/2025  9:53 AM EDT -----  Patient stated, \" I got a tattoo and it got infected, I went to the ER and was given antibiotics. When I cover it, the dressing gets stuck to it. I am not sure if I should leave it uncovered.\"    "

## 2025-07-28 ENCOUNTER — OFFICE VISIT (OUTPATIENT)
Age: 30
End: 2025-07-28

## 2025-07-28 VITALS
BODY MASS INDEX: 40.64 KG/M2 | RESPIRATION RATE: 16 BRPM | HEART RATE: 106 BPM | HEIGHT: 60 IN | DIASTOLIC BLOOD PRESSURE: 66 MMHG | WEIGHT: 207 LBS | SYSTOLIC BLOOD PRESSURE: 100 MMHG | TEMPERATURE: 97.6 F | OXYGEN SATURATION: 99 %

## 2025-07-28 DIAGNOSIS — L03.90 CELLULITIS: ICD-10-CM

## 2025-07-28 DIAGNOSIS — L92.3 TATTOO REACTION: Primary | ICD-10-CM

## 2025-07-28 PROCEDURE — 99213 OFFICE O/P EST LOW 20 MIN: CPT | Performed by: FAMILY MEDICINE

## 2025-07-28 RX ORDER — MUPIROCIN 2 %
OINTMENT (GRAM) TOPICAL DAILY PRN
Qty: 15 G | Refills: 0 | Status: SHIPPED | OUTPATIENT
Start: 2025-07-28

## (undated) DEVICE — STAPLE RELOAD ENDO EGIA ARTICULATING MEDIUM TAN 2MM.5MM.3MM

## (undated) DEVICE — TOWEL SET X-RAY

## (undated) DEVICE — INTENDED FOR TISSUE SEPARATION, AND OTHER PROCEDURES THAT REQUIRE A SHARP SURGICAL BLADE TO PUNCTURE OR CUT.: Brand: BARD-PARKER SAFETY BLADES SIZE 11, STERILE

## (undated) DEVICE — Device: Brand: OMNICLOSE TROCAR SITE CLOSURE DEVICE

## (undated) DEVICE — SURGICAL GOWN, XL SMARTSLEEVE: Brand: CONVERTORS

## (undated) DEVICE — SPECIMEN CONTAINER: Brand: CARDINAL HEALTH

## (undated) DEVICE — PACK GENERAL LF

## (undated) DEVICE — GLOVE SRG BIOGEL 7

## (undated) DEVICE — LUBRICANT JELLY SURGILUBE TUBE 4OZ FLIP TOP

## (undated) DEVICE — SYRINGE 20ML LL

## (undated) DEVICE — STR GUIDEWIRE BOWL WITH LID PK: Brand: CARDINAL HEALTH

## (undated) DEVICE — SMOKE REMOVAL SYSTEM: Brand: BOEHRINGER® SMOKE REMOVAL SYSTEM

## (undated) DEVICE — COVIDIEN ENDO GIA PURPLE (MED) RELOAD 60MM

## (undated) DEVICE — CYSTOSCOPY PACK: Brand: CONVERTORS

## (undated) DEVICE — LIGACLIP 10-M/L, 10MM ENDOSCOPIC ROTATING MULTIPLE CLIP APPLIERS: Brand: LIGACLIP

## (undated) DEVICE — KERLIX BANDAGE ROLL: Brand: KERLIX

## (undated) DEVICE — ENDOPATH 5MM CURVED SCISSORS WITH MONOPOLAR CAUTERY: Brand: ENDOPATH

## (undated) DEVICE — 34" SINGLE PATIENT USE HOVERMATT: Brand: SINGLE PATIENT USE HOVERMATT

## (undated) DEVICE — CYSTO TUBING TUR Y IRRIGATION

## (undated) DEVICE — OR2 STERILE LABELS: Brand: CENTURION

## (undated) DEVICE — COVIDIEN ENDO GIA PURPLE (MED) RELOAD 45MM

## (undated) DEVICE — TRUE CONTENT TO BE POPULATED AS PART OF REBRANDING: Brand: ARGYLE

## (undated) DEVICE — POWER SHELL SIGNIA

## (undated) DEVICE — [HIGH FLOW INSUFFLATOR,  DO NOT USE IF PACKAGE IS DAMAGED,  KEEP DRY,  KEEP AWAY FROM SUNLIGHT,  PROTECT FROM HEAT AND RADIOACTIVE SOURCES.]: Brand: PNEUMOSURE

## (undated) DEVICE — TROCAR: Brand: KII FIOS FIRST ENTRY

## (undated) DEVICE — 3M™ STERI-STRIP™ REINFORCED ADHESIVE SKIN CLOSURES, R1547, 1/2 IN X 4 IN (12 MM X 100 MM), 6 STRIPS/ENVELOPE: Brand: 3M™ STERI-STRIP™

## (undated) DEVICE — GLOVE SRG BIOGEL 7.5

## (undated) DEVICE — GUIDEWIRE STRGHT TIP 0.038 IN SOLO PLUS

## (undated) DEVICE — SYRINGE 10ML LL CONTROL TOP

## (undated) DEVICE — POUCH URO CATCHER II STERILE

## (undated) DEVICE — GLOVE INDICATOR PI UNDERGLOVE SZ 7 BLUE

## (undated) DEVICE — SUT ETHIBOND 2-0 SH/SH 36 IN X523H

## (undated) DEVICE — GLOVE SRG BIOGEL ECLIPSE 7.5

## (undated) DEVICE — HARMONIC 1100 SHEARS, 36CM SHAFT LENGTH: Brand: HARMONIC

## (undated) DEVICE — RADIOLOGY STERILE LABELS: Brand: CENTURION

## (undated) DEVICE — TIBURON LAPAROTOMY DRAPE: Brand: CONVERTORS

## (undated) DEVICE — IRRIG ENDO FLO TUBING

## (undated) DEVICE — CHLORAPREP HI-LITE 26ML ORANGE

## (undated) DEVICE — ADHESIVE SKIN CLSR DERMABOND NX

## (undated) DEVICE — NGAGE, NITINOL STONE EXTRACTOR: Brand: NGAGE

## (undated) DEVICE — FABRIC REINFORCED, SURGICAL GOWN, XL: Brand: CONVERTORS

## (undated) DEVICE — MAYO STAND COVER: Brand: CONVERTORS

## (undated) DEVICE — WEBRIL 6 IN UNSTERILE

## (undated) DEVICE — SUT MONOCRYL 4-0 PS-2 27 IN Y426H

## (undated) DEVICE — BASIC DOUBLE BASIN 2-LF: Brand: MEDLINE INDUSTRIES, INC.

## (undated) DEVICE — DRAPE UTILITY

## (undated) DEVICE — VISUALIZATION SYSTEM: Brand: CLEARIFY

## (undated) DEVICE — SHEATH URETERAL ACCESS 10/12FR 25CM PROXIS

## (undated) DEVICE — SUT VICRYL 0 18 IN J906G

## (undated) DEVICE — SPONGE LAP 18 X 18 IN STRL RFD

## (undated) DEVICE — SUT PDS II 3-0 SH 27 IN Z316H

## (undated) DEVICE — ENDOPATH XCEL BLADELESS TROCARS WITH STABILITY SLEEVES: Brand: ENDOPATH XCEL

## (undated) DEVICE — 10 MM BABCOCKS WITH RATCHET HANDLES: Brand: ENDOPATH